# Patient Record
Sex: FEMALE | Race: WHITE | NOT HISPANIC OR LATINO | Employment: OTHER | URBAN - METROPOLITAN AREA
[De-identification: names, ages, dates, MRNs, and addresses within clinical notes are randomized per-mention and may not be internally consistent; named-entity substitution may affect disease eponyms.]

---

## 2016-12-15 LAB
CBC, PLATELET CT, AND DIFF: (no result)
EXTERNAL ABO GROUPING: NORMAL
EXTERNAL ANTIBODY SCREEN: NEGATIVE
EXTERNAL CHLAMYDIA SCREEN: NEGATIVE
EXTERNAL GC/CHLAMYDIA: NORMAL
EXTERNAL GONORRHEA SCREEN: NEGATIVE
EXTERNAL RH FACTOR: POSITIVE
EXTERNAL RUBELLA QUALITATIVE: (no result)
EXTERNAL THINPREP: NEGATIVE
EXTERNAL URINE CULTURE: NORMAL
HIV1 AB SPEC QL IA.RAPID: NEGATIVE
VZV IGG SER QL: NORMAL

## 2017-01-11 RX ORDER — PNV 112/IRON/FOLIC/OM3/DHA/EPA 3.33-.33MG
3 TABLET,CHEWABLE ORAL DAILY
Qty: 90 TABLET | Refills: 11 | Status: SHIPPED | OUTPATIENT
Start: 2017-01-11 | End: 2017-11-17

## 2017-01-17 ENCOUNTER — ROUTINE PRENATAL (OUTPATIENT)
Dept: OBSTETRICS AND GYNECOLOGY | Age: 37
End: 2017-01-17

## 2017-01-17 ENCOUNTER — PROCEDURE VISIT (OUTPATIENT)
Dept: OBSTETRICS AND GYNECOLOGY | Age: 37
End: 2017-01-17

## 2017-01-17 ENCOUNTER — TELEPHONE (OUTPATIENT)
Dept: OBSTETRICS AND GYNECOLOGY | Age: 37
End: 2017-01-17

## 2017-01-17 VITALS — BODY MASS INDEX: 31.79 KG/M2 | WEIGHT: 203 LBS | SYSTOLIC BLOOD PRESSURE: 110 MMHG | DIASTOLIC BLOOD PRESSURE: 74 MMHG

## 2017-01-17 DIAGNOSIS — O36.80X1 PREGNANCY WITH INCONCLUSIVE FETAL VIABILITY, FETUS 1: Primary | ICD-10-CM

## 2017-01-17 DIAGNOSIS — Z34.82 PRENATAL CARE, SUBSEQUENT PREGNANCY, SECOND TRIMESTER: ICD-10-CM

## 2017-01-17 DIAGNOSIS — Z34.02 ENCOUNTER FOR SUPERVISION OF NORMAL FIRST PREGNANCY IN SECOND TRIMESTER: Primary | ICD-10-CM

## 2017-01-17 DIAGNOSIS — IMO0002 ADULT BODY MASS INDEX GREATER THAN 30: ICD-10-CM

## 2017-01-17 DIAGNOSIS — Z3A.13 13 WEEKS GESTATION OF PREGNANCY: ICD-10-CM

## 2017-01-17 PROCEDURE — 76801 OB US < 14 WKS SINGLE FETUS: CPT | Performed by: NURSE PRACTITIONER

## 2017-01-17 PROCEDURE — 0502F SUBSEQUENT PRENATAL CARE: CPT | Performed by: NURSE PRACTITIONER

## 2017-01-17 NOTE — MR AVS SNAPSHOT
Clementina Hernandez   2017 3:15 PM   Procedure visit    Dept Phone:  [phone]   Encounter #:  [id number]    Provider:  ULTRASOUND AFTAB MARIO   Department:  [facility] OB [facility]YN                Your Full Care Plan              Your Updated Medication List          This list is accurate as of: 17  3:54 PM.  Always use your most recent med list.                VITAFOL GUMMIES 3.33-0.333-34.8 MG chewable tablet   Chew 3 tablets Daily.               We Performed the Following     US Ob < 14 Weeks Single or First Gestation       You Were Diagnosed With        Codes Comments    Pregnancy with inconclusive fetal viability, fetus 1    -  Primary ICD-10-CM: O36.80X1  ICD-9-CM: V23.87     Adult body mass index greater than 30     ICD-10-CM: E66.8  ICD-9-CM: V85.30     13 weeks gestation of pregnancy     ICD-10-CM: Z3A.13  ICD-9-CM: V22.2     Prenatal care, subsequent pregnancy, second trimester     ICD-10-CM: Z34.82  ICD-9-CM: V22.1       Instructions     None    Patient Instructions History      Upcoming Appointments     Visit Type Date Time Department    OB FOLLOWUP 2017  3:00 PM [facility]    ULTRASOUND 2017  3:15 PM [facility]    OB FOLLOWUP 2/15/2017  8:30 AM [facility]      MyChart Signup     [facility] Compario allows you to send messages to your doctor, view your test results, renew your prescriptions, schedule appointments, and more. To sign up, go to [employer] and click on the Sign Up Now link in the New User? box. Enter your Compario Activation Code exactly as it appears below along with the last four digits of your Social Security Number and your Date of Birth () to complete the sign-up process. If you do not sign up before the expiration date, you must request a new code.    Compario Activation Code: [email or web address]  Expires: 2017  3:52 PM    If you have questions, you can email  [email or web address] or call [phone] to talk to our MyChart staff. Remember, [employer] is NOT to be used for urgent needs. For medical emergencies, dial 911.               Other Info from Your Visit           Your Appointments     Feb 15, 2017  8:30 AM EST   OB FOLLOWUP with Dru Dawson MD   [facility] OB GYN (--)    [address] [address] [facility] [address]   [phone]              Allergies     No Known Allergies      Vital Signs     Last Menstrual Period Smoking Status                10/06/2016 Never Smoker          Problems and Diagnoses Noted     Body mass index (BMI) greater than 30 in adult    Pregnancy with inconclusive fetal viability, fetus 1    -  Primary    13 weeks gestation of pregnancy        Prenatal care

## 2017-01-17 NOTE — MR AVS SNAPSHOT
Clementina Hernandez   2017 3:00 PM   Routine Prenatal    Dept Phone:  [phone]   Encounter #:  [id number]    Provider:  JANET De Anda   Department:  [facility] OB [facility]YN                Your Full Care Plan              Your Updated Medication List          This list is accurate as of: 17  3:52 PM.  Always use your most recent med list.                VITAFOL GUMMIES 3.33-0.333-34.8 MG chewable tablet   Chew 3 tablets Daily.               Instructions     None    Patient Instructions History      Upcoming Appointments     Visit Type Date Time Department    OB FOLLOWUP 2017  3:00 PM [facility]    [facility]    OB FOLLOWUP 2/15/2017  8:30 AM [facility]      MyChart Signup     [facility] Outsell allows you to send messages to your doctor, view your test results, renew your prescriptions, schedule appointments, and more. To sign up, go to [employer] and click on the Sign Up Now link in the New User? box. Enter your Outsell Activation Code exactly as it appears below along with the last four digits of your Social Security Number and your Date of Birth () to complete the sign-up process. If you do not sign up before the expiration date, you must request a new code.    Outsell Activation Code: [email or web address]  Expires: 2017  3:52 PM    If you have questions, you can email [email or web address] or call [phone] to talk to our Outsell staff. Remember, Outsell is NOT to be used for urgent needs. For medical emergencies, dial 911.               Other Info from Your Visit           Your Appointments     Feb 15, 2017  8:30 AM EST   OB FOLLOWUP with Dru Dawson MD   [facility] (--)    [address] [facility] [address] [phone]              Allergies     No Known Allergies      Vital Signs     Blood Pressure Weight  Last Menstrual Period Body Mass Index Smoking Status       110/74 203 lb (92.1 kg) 10/06/2016 31.79 kg/m2 Never Smoker

## 2017-01-17 NOTE — TELEPHONE ENCOUNTER
Clementina was here today for an U/S she states that the U/S she had today showed her being further along than the pervious U/S.  She wanted to know if her next appt. In four weeks would be the anatomy scan??

## 2017-01-17 NOTE — PROGRESS NOTES
Ultrasound done. 14w1d by US, .  Morning sickness has gotten much better.  She is having some slight swelling in hands but no where else.  Discussed increasing water intake. Declines any genetic testing.

## 2017-01-20 ENCOUNTER — TELEPHONE (OUTPATIENT)
Dept: OBSTETRICS AND GYNECOLOGY | Age: 37
End: 2017-01-20

## 2017-01-22 NOTE — TELEPHONE ENCOUNTER
Yes, we will use the new due date of July 17 based on this recent ultrasound which placed her at 14 weeks 1 day instead of 13 weeks 3 days.

## 2017-01-26 NOTE — TELEPHONE ENCOUNTER
I think this more recent scan will be the more accurate scan for dating.  So we will go with the new due date.  However in 4 weeks she will only be 18 weeks along and should not do the anatomy scan until she is 20 weeks along.  And I think it would be a good idea for her to do the NexGen testing given her age.  Although I expect she would not have a termination if the baby had a chromosome problem.

## 2017-01-31 ENCOUNTER — TELEPHONE (OUTPATIENT)
Dept: OBSTETRICS AND GYNECOLOGY | Age: 37
End: 2017-01-31

## 2017-01-31 ENCOUNTER — PROCEDURE VISIT (OUTPATIENT)
Dept: OBSTETRICS AND GYNECOLOGY | Age: 37
End: 2017-01-31

## 2017-01-31 ENCOUNTER — ROUTINE PRENATAL (OUTPATIENT)
Dept: OBSTETRICS AND GYNECOLOGY | Age: 37
End: 2017-01-31

## 2017-01-31 VITALS — SYSTOLIC BLOOD PRESSURE: 128 MMHG | DIASTOLIC BLOOD PRESSURE: 82 MMHG | WEIGHT: 206 LBS | BODY MASS INDEX: 32.26 KG/M2

## 2017-01-31 DIAGNOSIS — Z3A.16 16 WEEKS GESTATION OF PREGNANCY: ICD-10-CM

## 2017-01-31 DIAGNOSIS — Z3A.16 16 WEEKS GESTATION OF PREGNANCY: Primary | ICD-10-CM

## 2017-01-31 DIAGNOSIS — O26.852 SPOTTING AFFECTING PREGNANCY IN SECOND TRIMESTER: Primary | ICD-10-CM

## 2017-01-31 PROCEDURE — 76817 TRANSVAGINAL US OBSTETRIC: CPT | Performed by: OBSTETRICS & GYNECOLOGY

## 2017-01-31 PROCEDURE — 76805 OB US >/= 14 WKS SNGL FETUS: CPT | Performed by: OBSTETRICS & GYNECOLOGY

## 2017-01-31 PROCEDURE — 99213 OFFICE O/P EST LOW 20 MIN: CPT | Performed by: OBSTETRICS & GYNECOLOGY

## 2017-01-31 NOTE — MR AVS SNAPSHOT
Clementina Hernandez   2017 12:45 PM   Routine Prenatal    Dept Phone:  [phone]   Encounter #:  [id number]    Provider:  Dru Dawson MD   Department:  [facility]                Your Full Care Plan              Your Updated Medication List          This list is accurate as of: 17  2:08 PM.  Always use your most recent med list.                VITAFOL GUMMIES 3.33-0.333-34.8 MG chewable tablet   Chew 3 tablets Daily.               You Were Diagnosed With        Codes Comments    16 weeks gestation of pregnancy    -  Primary ICD-10-CM: Z3A.16  ICD-9-CM: V22.2       Instructions     None    Patient Instructions History      Upcoming Appointments     Visit Type Date Time Department    OB FOLLOWUP 2017 12:45 PM [facility]    ULTRASOUND 2017  1:15 PM [facility]    OB FOLLOWUP 2017 11:00 AM [facility]    ULTRASOUND 2017 11:15 AM [facility]      MyChart Signup     [facility] [employer] Branch allows you to send messages to your doctor, view your test results, renew your prescriptions, schedule appointments, and more. To sign up, go to [employer] and click on the Sign Up Now link in the New User? box. Enter your [employer] Activation Code exactly as it appears below along with the last four digits of your Social Security Number and your Date of Birth () to complete the sign-up process. If you do not sign up before the expiration date, you must request a new code.    [employer] Activation Code: [email or web address]  Expires: 2017  3:52 PM    If you have questions, you can email [email or web address] or call [phone] to talk to our Boll & Branch staff. Remember, B[employer] Branch is NOT to be used for urgent needs. For medical emergencies, dial 911.               Other Info from Your Visit           Your Appointments     2017 11:00 AM EST   OB FOLLOWUP with Dru Dawson MD      [facility] (--)    [address] [facility] [address]   [phone]            Feb 21, 2017 11:15 AM EST   Ultrasound with ULTRASOUND [facility] (--)    [address] [facility] [address]   [phone]              Allergies     No Known Allergies      Reason for Visit     Pregnancy Problem U/S check cervical lenght   spotting  + cramping / passed a clot /        Vital Signs     Blood Pressure Weight Last Menstrual Period Body Mass Index Smoking Status       128/82 206 lb (93.4 kg) 10/06/2016 32.26 kg/m2 Never Smoker       Problems and Diagnoses Noted     16 weeks gestation of pregnancy    -  Primary

## 2017-01-31 NOTE — TELEPHONE ENCOUNTER
Dr PRIDE pt, 16 wks has had some brown spotting with light cramping yesterday and today, please advise.

## 2017-01-31 NOTE — MR AVS SNAPSHOT
Clementina Hernandez   2017 1:15 PM   Procedure visit    Dept Phone:  [phone]   Encounter #:  [id number]    Provider:  ULTRASOUND HILLARY MARTIN   Department:  [facility]                Your Full Care Plan              Your Updated Medication List          This list is accurate as of: 17  2:08 PM.  Always use your most recent med list.                VITAFOL GUMMIES 3.33-0.333-34.8 MG chewable tablet   Chew 3 tablets Daily.               We Performed the Following     US Ob Transvaginal       You Were Diagnosed With        Codes Comments    Spotting affecting pregnancy in second trimester    -  Primary ICD-10-CM: O26.852  ICD-9-CM: 649.53     16 weeks gestation of pregnancy     ICD-10-CM: Z3A.16  ICD-9-CM: V22.2       Instructions     None    Patient Instructions History      Upcoming Appointments     Visit Type Date Time Department    OB FOLLOWUP 2017 12:45 PM [facility]    ULTRASOUND 2017  1:15 PM [facility]    OB FOLLOWUP 2017 11:00 AM [facility]    ULTRASOUND 2017 11:15 AM [facility]      MyChart Signup     [facility] [employer] allows you to send messages to your doctor, view your test results, renew your prescriptions, schedule appointments, and more. To sign up, go to [employer]erCurious and click on the Sign Up Now link in the New User? box. Enter your [employer] Activation Code exactly as it appears below along with the last four digits of your Social Security Number and your Date of Birth () to complete the sign-up process. If you do not sign up before the expiration date, you must request a new code.    [employer] Activation Code: [email or web address]  Expires: 2017  3:52 PM    If you have questions, you can email [email or web address] or call [phone] to talk to our [employer] staff. Remember, [employer] is NOT to be used for urgent needs. For medical emergencies, dial  911.               Other Info from Your Visit           Your Appointments     Feb 21, 2017 11:00 AM EST   OB FOLLOWUP with Dru Dawson MD   [facility] (--)    [address] [facility] [address] [facility] [address]   [phone]            Feb 21, 2017 11:15 AM EST   [facility]    [address] [facility] [address]   [phone]              Allergies     No Known Allergies      Vital Signs     Last Menstrual Period Smoking Status                10/06/2016 Never Smoker          Problems and Diagnoses Noted     Spotting complicating pregnancy    -  Primary    16 weeks gestation of pregnancy

## 2017-01-31 NOTE — PROGRESS NOTES
Had intercourse on Sunday, January 29, and then was having cramping Sunday with no bleeding, started spotting on Monday and again today and passed a quarter size clot or little larger.  No leakage of fluid.  U/S= viable intrauterine fetus with a fetal heart tones 139.  Estimated fetal weight 5 ounces, 42%; AC 79%; cervix 4.93 cm with no dynamic changes.  T-shaped internal os.  No sludge.  Small 3 x 9 mm collection of blood at the edge of the placenta.  There was some old blood present on the probe after the ultrasound.     Speculum exam shows a large cervix with a patulous external os some old blood present at the cervix.  On bimanual exam the cervix feels firm and long with the external os not quite a fingertip dilated.     Assessment: Suspect there may have been some bleeding from an edge of the placenta or it may just have been from irritation of the cervix.  Reassured Clementina Goodman.  However I do recommend that not have intercourse for at least a month.  We will repeat an ultrasound in 3 weeks, just before she begins to travel again to [place].

## 2017-02-21 ENCOUNTER — ROUTINE PRENATAL (OUTPATIENT)
Dept: OBSTETRICS AND GYNECOLOGY | Age: 37
End: 2017-02-21

## 2017-02-21 ENCOUNTER — PROCEDURE VISIT (OUTPATIENT)
Dept: OBSTETRICS AND GYNECOLOGY | Age: 37
End: 2017-02-21

## 2017-02-21 VITALS — WEIGHT: 213 LBS | BODY MASS INDEX: 33.36 KG/M2 | DIASTOLIC BLOOD PRESSURE: 78 MMHG | SYSTOLIC BLOOD PRESSURE: 110 MMHG

## 2017-02-21 DIAGNOSIS — Z3A.19 19 WEEKS GESTATION OF PREGNANCY: ICD-10-CM

## 2017-02-21 DIAGNOSIS — Z34.82 PRENATAL CARE, SUBSEQUENT PREGNANCY, SECOND TRIMESTER: ICD-10-CM

## 2017-02-21 DIAGNOSIS — Z34.82 PRENATAL CARE, SUBSEQUENT PREGNANCY, SECOND TRIMESTER: Primary | ICD-10-CM

## 2017-02-21 DIAGNOSIS — Z36.89 SCREENING, ANTENATAL, FOR FETAL ANATOMIC SURVEY: Primary | ICD-10-CM

## 2017-02-21 DIAGNOSIS — O26.852 SPOTTING AFFECTING PREGNANCY IN SECOND TRIMESTER: ICD-10-CM

## 2017-02-21 PROCEDURE — 76805 OB US >/= 14 WKS SNGL FETUS: CPT | Performed by: OBSTETRICS & GYNECOLOGY

## 2017-02-21 PROCEDURE — 76817 TRANSVAGINAL US OBSTETRIC: CPT | Performed by: OBSTETRICS & GYNECOLOGY

## 2017-02-21 PROCEDURE — 0502F SUBSEQUENT PRENATAL CARE: CPT | Performed by: OBSTETRICS & GYNECOLOGY

## 2017-02-21 NOTE — PROGRESS NOTES
Has had no more bleeding after a little after the last US. Travelling to [place] next week.  U/S Normal Anatomy, post placenta. Cervix looks good, 6.12 cm long. Placenta well away, 3.68 cm from cervix.  Will repeat US in 3 weeks for the outflow tracts.  RTO 3 weeks for US

## 2017-02-22 ENCOUNTER — TELEPHONE (OUTPATIENT)
Dept: OBSTETRICS AND GYNECOLOGY | Age: 37
End: 2017-02-22

## 2017-02-22 NOTE — TELEPHONE ENCOUNTER
----- Message from Nayanawestley Rainey sent at 2/22/2017 12:42 PM EST -----  Dr Faria pt, pt was seen in office yesterday and forgot to ask when she is able to have sex again. Please call pt.     Pt#: [phone]

## 2017-02-22 NOTE — TELEPHONE ENCOUNTER
I called Clementina Goodman and told her not to have sex until after she gets back from [place], then it would be okay.

## 2017-03-05 ENCOUNTER — HOSPITAL ENCOUNTER (OUTPATIENT)
Facility: HOSPITAL | Age: 37
Setting detail: OBSERVATION
Discharge: HOME | End: 2017-03-06
Attending: OBSTETRICS & GYNECOLOGY | Admitting: OBSTETRICS & GYNECOLOGY

## 2017-03-05 DIAGNOSIS — K52.9 GASTROENTERITIS: Primary | ICD-10-CM

## 2017-03-05 PROBLEM — Z34.90 PREGNANCY: Status: ACTIVE | Noted: 2017-03-05

## 2017-03-05 PROCEDURE — 99218 PR INITIAL OBSERVATION CARE/DAY 30 MINUTES: CPT | Performed by: OBSTETRICS & GYNECOLOGY

## 2017-03-05 PROCEDURE — G0378 HOSPITAL OBSERVATION PER HR: HCPCS

## 2017-03-05 PROCEDURE — 87493 C DIFF AMPLIFIED PROBE: CPT | Performed by: OBSTETRICS & GYNECOLOGY

## 2017-03-05 RX ORDER — SODIUM CHLORIDE, SODIUM LACTATE, POTASSIUM CHLORIDE, CALCIUM CHLORIDE 600; 310; 30; 20 MG/100ML; MG/100ML; MG/100ML; MG/100ML
999 INJECTION, SOLUTION INTRAVENOUS ONCE
Status: COMPLETED | OUTPATIENT
Start: 2017-03-06 | End: 2017-03-06

## 2017-03-05 RX ORDER — ONDANSETRON 2 MG/ML
4 INJECTION INTRAMUSCULAR; INTRAVENOUS EVERY 6 HOURS PRN
Status: DISCONTINUED | OUTPATIENT
Start: 2017-03-05 | End: 2017-03-06 | Stop reason: HOSPADM

## 2017-03-05 RX ORDER — SODIUM CHLORIDE 0.9 % (FLUSH) 0.9 %
1-10 SYRINGE (ML) INJECTION AS NEEDED
Status: DISCONTINUED | OUTPATIENT
Start: 2017-03-05 | End: 2017-03-06 | Stop reason: HOSPADM

## 2017-03-05 RX ORDER — LIDOCAINE HYDROCHLORIDE 10 MG/ML
5 INJECTION, SOLUTION INFILTRATION; PERINEURAL AS NEEDED
Status: DISCONTINUED | OUTPATIENT
Start: 2017-03-05 | End: 2017-03-06 | Stop reason: HOSPADM

## 2017-03-05 RX ADMIN — LIDOCAINE HYDROCHLORIDE 0.2 ML: 10 INJECTION, SOLUTION EPIDURAL; INFILTRATION; INTRACAUDAL; PERINEURAL at 23:32

## 2017-03-06 VITALS
OXYGEN SATURATION: 97 % | HEART RATE: 88 BPM | SYSTOLIC BLOOD PRESSURE: 100 MMHG | TEMPERATURE: 98.8 F | RESPIRATION RATE: 18 BRPM | DIASTOLIC BLOOD PRESSURE: 60 MMHG | WEIGHT: 210 LBS | HEIGHT: 67 IN | BODY MASS INDEX: 32.96 KG/M2

## 2017-03-06 LAB
ALBUMIN SERPL-MCNC: 3.8 G/DL (ref 3.5–5.2)
ALBUMIN/GLOB SERPL: 1.2 G/DL
ALP SERPL-CCNC: 66 U/L (ref 39–117)
ALT SERPL W P-5'-P-CCNC: 10 U/L (ref 1–33)
ANION GAP SERPL CALCULATED.3IONS-SCNC: 18.7 MMOL/L
AST SERPL-CCNC: 15 U/L (ref 1–32)
BILIRUB SERPL-MCNC: 0.3 MG/DL (ref 0.1–1.2)
BUN BLD-MCNC: 9 MG/DL (ref 6–20)
BUN/CREAT SERPL: 16.7 (ref 7–25)
C DIFF TOX GENS STL QL NAA+PROBE: NEGATIVE
CALCIUM SPEC-SCNC: 9 MG/DL (ref 8.6–10.5)
CHLORIDE SERPL-SCNC: 101 MMOL/L (ref 98–107)
CO2 SERPL-SCNC: 19.3 MMOL/L (ref 22–29)
CREAT BLD-MCNC: 0.54 MG/DL (ref 0.57–1)
DEPRECATED RDW RBC AUTO: 40.2 FL (ref 37–54)
ERYTHROCYTE [DISTWIDTH] IN BLOOD BY AUTOMATED COUNT: 13.1 % (ref 11.7–13)
GFR SERPL CREATININE-BSD FRML MDRD: 129 ML/MIN/1.73
GLOBULIN UR ELPH-MCNC: 3.1 GM/DL
GLUCOSE BLD-MCNC: 111 MG/DL (ref 65–99)
HCT VFR BLD AUTO: 40.9 % (ref 35.6–45.5)
HGB BLD-MCNC: 14.4 G/DL (ref 11.9–15.5)
LYMPHOCYTES # BLD MANUAL: 0.76 10*3/MM3 (ref 0.9–4.8)
LYMPHOCYTES NFR BLD MANUAL: 3 % (ref 5–12)
LYMPHOCYTES NFR BLD MANUAL: 5 % (ref 19.6–45.3)
MCH RBC QN AUTO: 29.8 PG (ref 26.9–32)
MCHC RBC AUTO-ENTMCNC: 35.2 G/DL (ref 32.4–36.3)
MCV RBC AUTO: 84.7 FL (ref 80.5–98.2)
MONOCYTES # BLD AUTO: 0.46 10*3/MM3 (ref 0.2–1.2)
NEUTROPHILS # BLD AUTO: 14 10*3/MM3 (ref 1.9–8.1)
NEUTROPHILS NFR BLD MANUAL: 92 % (ref 42.7–76)
PLAT MORPH BLD: NORMAL
PLATELET # BLD AUTO: 201 10*3/MM3 (ref 140–500)
PMV BLD AUTO: 11.1 FL (ref 6–12)
POTASSIUM BLD-SCNC: 3.9 MMOL/L (ref 3.5–5.2)
PROT SERPL-MCNC: 6.9 G/DL (ref 6–8.5)
RBC # BLD AUTO: 4.83 10*6/MM3 (ref 3.9–5.2)
RBC MORPH BLD: NORMAL
SODIUM BLD-SCNC: 139 MMOL/L (ref 136–145)
WBC MORPH BLD: NORMAL
WBC NRBC COR # BLD: 15.22 10*3/MM3 (ref 4.5–10.7)

## 2017-03-06 PROCEDURE — 87046 STOOL CULTR AEROBIC BACT EA: CPT | Performed by: OBSTETRICS & GYNECOLOGY

## 2017-03-06 PROCEDURE — 96376 TX/PRO/DX INJ SAME DRUG ADON: CPT

## 2017-03-06 PROCEDURE — 85027 COMPLETE CBC AUTOMATED: CPT | Performed by: OBSTETRICS & GYNECOLOGY

## 2017-03-06 PROCEDURE — 80053 COMPREHEN METABOLIC PANEL: CPT | Performed by: OBSTETRICS & GYNECOLOGY

## 2017-03-06 PROCEDURE — 25010000002 ONDANSETRON PER 1 MG: Performed by: OBSTETRICS & GYNECOLOGY

## 2017-03-06 PROCEDURE — 85007 BL SMEAR W/DIFF WBC COUNT: CPT | Performed by: OBSTETRICS & GYNECOLOGY

## 2017-03-06 PROCEDURE — 87045 FECES CULTURE AEROBIC BACT: CPT | Performed by: OBSTETRICS & GYNECOLOGY

## 2017-03-06 PROCEDURE — 96361 HYDRATE IV INFUSION ADD-ON: CPT

## 2017-03-06 PROCEDURE — 96374 THER/PROPH/DIAG INJ IV PUSH: CPT

## 2017-03-06 PROCEDURE — G0378 HOSPITAL OBSERVATION PER HR: HCPCS

## 2017-03-06 RX ORDER — SODIUM CHLORIDE, SODIUM LACTATE, POTASSIUM CHLORIDE, CALCIUM CHLORIDE 600; 310; 30; 20 MG/100ML; MG/100ML; MG/100ML; MG/100ML
125 INJECTION, SOLUTION INTRAVENOUS CONTINUOUS
Status: DISCONTINUED | OUTPATIENT
Start: 2017-03-06 | End: 2017-03-06 | Stop reason: HOSPADM

## 2017-03-06 RX ADMIN — SODIUM CHLORIDE, POTASSIUM CHLORIDE, SODIUM LACTATE AND CALCIUM CHLORIDE 125 ML/HR: 600; 310; 30; 20 INJECTION, SOLUTION INTRAVENOUS at 01:05

## 2017-03-06 RX ADMIN — ONDANSETRON 4 MG: 2 INJECTION INTRAMUSCULAR; INTRAVENOUS at 00:35

## 2017-03-06 RX ADMIN — ONDANSETRON 4 MG: 2 INJECTION INTRAMUSCULAR; INTRAVENOUS at 07:10

## 2017-03-06 RX ADMIN — SODIUM CHLORIDE, POTASSIUM CHLORIDE, SODIUM LACTATE AND CALCIUM CHLORIDE 999 ML/HR: 600; 310; 30; 20 INJECTION, SOLUTION INTRAVENOUS at 00:07

## 2017-03-06 NOTE — NURSING NOTE
LR stopped, IV d/c'd, logicare instructions given regarding N/V/D, pt verbalizes understanding of all education given with no questions asked, pt up to get dressed and ready for d/c

## 2017-03-06 NOTE — H&P
[facility]  Obstetric History and Physical    Chief Complaint   Patient presents with   • Vomiting     Vomiting today, Diarrhea started 3/4/2017 @   20 times today,  most contractions last night       Subjective     Patient is a 36 y.o. female  currently at 21w3d, who presents with proximally 24 hours of nausea vomiting and diarrhea.  Patient estimates she has passed about 30 loose stools.  She has mild diffuse abdominal pain due to the diarrhea.  She feels some abdominal pressure but no sharp pain.  No vaginal bleeding.   The patient returned from [place] on Thursday.  No sick contacts..      The following portions of the patients history were reviewed and updated as appropriate: current medications, allergies, past medical history, past surgical history, past family history, past social history and problem list .       Prenatal Information:  Prenatal Results         1st Trimester Ref. Range Date Time   CBC with auto diff ^ WBC=10.9; H/H=14.5/43.9; Ljns=046.   12/15/16    Rubella IgG ^ Non-Immune   12/15/16    Hepatitis B SAg  Negative  Negative 12/15/16 1313   RPR  Non Reactive  Non Reactive 12/15/16 1313   ABO  B   12/15/16 1313   Rh  Positive   12/15/16 1313   Anibody Screen  Negative  Negative 12/15/16 1313   HIV ^ Negative   12/15/16    Varicella IgG ^ Immune   12/15/16    Urinalysis with microscopy       Urine Culture ^ No Growth.   12/15/16    GC/Chlamydia/TV ^ Neg GC / Neg CT   12/15/16    ThinPrep/Pap ^ Negative   12/15/16    2nd and 3rd Trimester Ref. Range Date Time   Hemoglobin / Hematocrit  43.9 % 34.0 - 46.6 % 12/15/16 1313   Hemoglobin  14.5 g/dL 11.1 - 15.9 g/dL 12/15/16 1313   Group B Strep Culture       Glucose Challege Test 1 hour       Glucose Tolerance Test 3 hours       Pre-eclampsia Panel       Risk Screening Ref. Range Date Time   Fetal Fibronectin       Amnisure       Hepatitis C Antibody  <0.1 s/co ratio 0.0 - 0.9 s/co ratio 12/15/16 1313   Hemoglobin electrophoresis        Cystic Fibrosis       Hemoglobin A1C       MSAFP - 4       NIPT       AFP       Parvovirus IgG       Parvovirus IgM       POCT - glucose       [facility]       24 Hour urine - Total protein       24 Hour urine - Creatinine clearance       Urinalysis with microscopy       Urine Culture ^ No Growth.   12/15/16    Drug Screening Ref. Range Date Time   Amphetamine Screen       Barbiturate Screen       Benzodiazepine Screen       Methadone Screen       Phencyclidine Screen       Opiates Screen       THC Screen       Cocaine Screen       Amphetamine Screen       Propoxyphene Screen       Buprenorphine Screen       Methamphetamine Screen       Oxycodone Screen       Tryicyclic Antidepressants Screen              Legend: ^: Historical            View all results for this pregnancy        External Prenatal Results         Pregnancy Outside Results - these were transcribed from office records.  See scanned records for details. Date Time   Hgb      Hct      ABO      Rh      Antibody Screen      Glucose Fasting GTT      Glucose Tolerance Test 1 hour      Glucose Tolerance Test 3 hour      Gonorrhea (discrete)      Chlamydia (discrete)      RPR      VDRL      Syphillis Antibody      Rubella ^ Non-Immune  12/15/16    HBsAg      Herpes Simplex Virus PCR      Herpes Simplex VIrus Culture      HIV ^ Negative  12/15/16    Hep C RNA Quant PCR      Hep C Antibody      Urine Drug Screen      AFP      Group B Strep      GBS Susceptibility to Clindamycin      GBS Susceptibility to Eythromycin      Fetal Fibronectin      Genetic Testing, Maternal Blood             Legend: ^: Historical           Past OB History:     Obstetric History       T2      TAB0   SAB0   E0   M0   L4       # Outcome Date GA Lbr Damon/2nd Weight Sex Delivery Anes PTL Lv   5 Current            4   36w0d  6 lb 5 oz (2.863 kg) F Vag-Spont  N Y      Name: ANABEL      Complications: Precipitous delivery   3   36w0d  7 lb 3 oz (3.26 kg) M  "Vag-Spont None Y Y      Name: SUNDEEP      Complications:  premature rupture of membranes (PPROM) delivered, current hospitalization,Precipitous delivery   2 Term  39w0d  7 lb 14 oz (3.572 kg) F Vag-Spont   Y      Name: MARTI      Complications: PIH (pregnancy induced hypertension), antepartum   1 Term  38w0d  6 lb 14 oz (3.118 kg) F Vag-Spont  Y Y      Name: ANGLE          Past Medical History: Past Medical History   Diagnosis Date   • Gestational hypertension      Only with the second pregnancy.   • History of benign schwannoma      Involving lumbar spine low the level of L-2 Dr. Chencho Skelton mentioned in  that it was \"reaching out to us\".   • Hypertension    • Hypertension in pregnancy, preeclampsia      Only with the second pregnancy.   • Hypothyroid      not medicating    • Migraine      Occasional headaches when pregnant, ? Not true migraine.   •  labor in third trimester    • Scoliosis      Pt denies   • Vaginal delivery      x4   Angle      Marti       Sundeep    2016   Cece       Past Surgical History Past Surgical History   Procedure Laterality Date   • Dental procedure       IMPLANT      Family History: Family History   Problem Relation Age of Onset   • No Known Problems Daughter    • No Known Problems Daughter    • No Known Problems Son    • No Known Problems Daughter       Social History:  reports that she has never smoked. She has never used smokeless tobacco.   reports that she does not drink alcohol.   reports that she does not use illicit drugs.        General ROS: Pertinent items are noted in HPI    Objective       Vital Signs Range for the last 24 hours  Temperature: Temp:  [97.8 °F (36.6 °C)] 97.8 °F (36.6 °C)   Temp Source: Temp src: Oral   BP:     Pulse:     Respirations: Resp:  [18] 18   SPO2: SpO2:  [97 %] 97 %   O2 Amount (l/min):     O2 Devices     Weight: Weight:  [210 lb (95.3 kg)] 210 lb (95.3 kg)     Physical Examination: General " appearance - appears tired  Mental status - normal mood, behavior, speech, dress, motor activity, and thought processes  Eyes - sclera anicteric  Abdomen - soft, nontender, nondistended, no masses or organomegaly  Gravid  Extremities - nontender bilaterally                            Fetal Heart Rate Assessment   Method: Fetal HR Assessment Method: external   Beats/min:     Baseline:     Varibility:     Accels:     Decels:     Tracing Category:       Uterine Assessment   Method: Method: palpation, TOCO (external toco transducer)   Frequency (min):     Ctx Count in 10 min:     Duration:     Intensity:     Intensity by IUPC:     Resting Tone:     Resting Tone by IUPC:     [place] Units:           Assessment/Plan     Active Problems:    Pregnancy        Assessment: Gastroenteritis  Plan: IV fluids, supportive care IV Zofran and check labs and stool cultures      Marlon Ospina MD  3/5/2017  11:48 PM

## 2017-03-09 LAB
BACTERIA SPEC AEROBE CULT: NORMAL
BACTERIA SPEC AEROBE CULT: NORMAL

## 2017-03-14 ENCOUNTER — PROCEDURE VISIT (OUTPATIENT)
Dept: OBSTETRICS AND GYNECOLOGY | Age: 37
End: 2017-03-14

## 2017-03-14 ENCOUNTER — ROUTINE PRENATAL (OUTPATIENT)
Dept: OBSTETRICS AND GYNECOLOGY | Age: 37
End: 2017-03-14

## 2017-03-14 VITALS — DIASTOLIC BLOOD PRESSURE: 82 MMHG | SYSTOLIC BLOOD PRESSURE: 124 MMHG | BODY MASS INDEX: 33.99 KG/M2 | WEIGHT: 217 LBS

## 2017-03-14 DIAGNOSIS — Z3A.22 22 WEEKS GESTATION OF PREGNANCY: ICD-10-CM

## 2017-03-14 DIAGNOSIS — Z36.9 NO ABNORMALITY OF FETAL HEART DETECTED: Primary | ICD-10-CM

## 2017-03-14 DIAGNOSIS — Z34.82 PRENATAL CARE, SUBSEQUENT PREGNANCY, SECOND TRIMESTER: Primary | ICD-10-CM

## 2017-03-14 PROCEDURE — 76816 OB US FOLLOW-UP PER FETUS: CPT | Performed by: OBSTETRICS & GYNECOLOGY

## 2017-03-14 PROCEDURE — 0502F SUBSEQUENT PRENATAL CARE: CPT | Performed by: OBSTETRICS & GYNECOLOGY

## 2017-03-14 NOTE — PROGRESS NOTES
Normal anatomy. Fundal placenta. Normal cardiac outflow tracts. Definite Male.  Hosp overnight 3/5 with nausea and vomiting and diarrhea for IV fluids. Still has some neck and shoulder pain.  Everybody in the family had same thing after Clementina Goodman.

## 2017-04-12 ENCOUNTER — ROUTINE PRENATAL (OUTPATIENT)
Dept: OBSTETRICS AND GYNECOLOGY | Age: 37
End: 2017-04-12

## 2017-04-12 VITALS — WEIGHT: 226 LBS | DIASTOLIC BLOOD PRESSURE: 78 MMHG | BODY MASS INDEX: 35.4 KG/M2 | SYSTOLIC BLOOD PRESSURE: 110 MMHG

## 2017-04-12 DIAGNOSIS — Z36.9 ANTENATAL SCREENING ENCOUNTER: ICD-10-CM

## 2017-04-12 DIAGNOSIS — Z3A.26 26 WEEKS GESTATION OF PREGNANCY: ICD-10-CM

## 2017-04-12 DIAGNOSIS — O09.522 AMA (ADVANCED MATERNAL AGE) MULTIGRAVIDA 35+, SECOND TRIMESTER: ICD-10-CM

## 2017-04-12 DIAGNOSIS — Z34.82 ENCOUNTER FOR SUPERVISION OF OTHER NORMAL PREGNANCY, SECOND TRIMESTER: Primary | ICD-10-CM

## 2017-04-12 PROBLEM — Z34.90 PREGNANCY: Status: RESOLVED | Noted: 2017-03-05 | Resolved: 2017-04-12

## 2017-04-12 LAB
BASOPHILS # BLD AUTO: 0.02 10*3/MM3 (ref 0–0.2)
BASOPHILS NFR BLD AUTO: 0.2 % (ref 0–1.5)
EOSINOPHIL # BLD AUTO: 0.06 10*3/MM3 (ref 0–0.7)
EOSINOPHIL NFR BLD AUTO: 0.5 % (ref 0.3–6.2)
ERYTHROCYTE [DISTWIDTH] IN BLOOD BY AUTOMATED COUNT: 13.9 % (ref 11.7–13)
GLUCOSE 1H P 50 G GLC PO SERPL-MCNC: 116 MG/DL (ref 65–139)
HCT VFR BLD AUTO: 37.2 % (ref 35.6–45.5)
HGB BLD-MCNC: 12 G/DL (ref 11.9–15.5)
IMM GRANULOCYTES # BLD: 0.05 10*3/MM3 (ref 0–0.03)
IMM GRANULOCYTES NFR BLD: 0.4 % (ref 0–0.5)
LYMPHOCYTES # BLD AUTO: 2.1 10*3/MM3 (ref 0.9–4.8)
LYMPHOCYTES NFR BLD AUTO: 17.9 % (ref 19.6–45.3)
MCH RBC QN AUTO: 28.4 PG (ref 26.9–32)
MCHC RBC AUTO-ENTMCNC: 32.3 G/DL (ref 32.4–36.3)
MCV RBC AUTO: 87.9 FL (ref 80.5–98.2)
MONOCYTES # BLD AUTO: 0.41 10*3/MM3 (ref 0.2–1.2)
MONOCYTES NFR BLD AUTO: 3.5 % (ref 5–12)
NEUTROPHILS # BLD AUTO: 9.12 10*3/MM3 (ref 1.9–8.1)
NEUTROPHILS NFR BLD AUTO: 77.5 % (ref 42.7–76)
PLATELET # BLD AUTO: 208 10*3/MM3 (ref 140–500)
RBC # BLD AUTO: 4.23 10*6/MM3 (ref 3.9–5.2)
WBC # BLD AUTO: 11.76 10*3/MM3 (ref 4.5–10.7)

## 2017-04-12 PROCEDURE — 0502F SUBSEQUENT PRENATAL CARE: CPT | Performed by: OBSTETRICS & GYNECOLOGY

## 2017-04-12 NOTE — PROGRESS NOTES
Some contractions, for about an hour last night, uncomfortable.  Has gained 9# in the last 4 weeks. Can't really exercise because of contractions.  Rash reviewed/examined. Better with Clindamycin cream given by derm. They didn't think it was shingles. She had a similar area on her left flank last year.  RTO 2 weeks, consider taking Procardia with her on the next trip.

## 2017-04-18 NOTE — PROGRESS NOTES
Notify Clementina Goodman that her glucose screen was normal at 116 and her hemoglobin is good at 12.0, she does not need any iron.

## 2017-04-25 ENCOUNTER — TELEPHONE (OUTPATIENT)
Dept: OBSTETRICS AND GYNECOLOGY | Age: 37
End: 2017-04-25

## 2017-04-26 ENCOUNTER — ROUTINE PRENATAL (OUTPATIENT)
Dept: OBSTETRICS AND GYNECOLOGY | Age: 37
End: 2017-04-26

## 2017-04-26 VITALS — BODY MASS INDEX: 36.49 KG/M2 | SYSTOLIC BLOOD PRESSURE: 108 MMHG | WEIGHT: 233 LBS | DIASTOLIC BLOOD PRESSURE: 72 MMHG

## 2017-04-26 DIAGNOSIS — Z3A.28 28 WEEKS GESTATION OF PREGNANCY: ICD-10-CM

## 2017-04-26 DIAGNOSIS — Z34.83 PRENATAL CARE, SUBSEQUENT PREGNANCY, THIRD TRIMESTER: Primary | ICD-10-CM

## 2017-04-26 DIAGNOSIS — O47.03 PREMATURE UTERINE CONTRACTIONS, ANTEPARTUM, THIRD TRIMESTER: ICD-10-CM

## 2017-04-26 PROCEDURE — 0502F SUBSEQUENT PRENATAL CARE: CPT | Performed by: OBSTETRICS & GYNECOLOGY

## 2017-04-26 RX ORDER — NIFEDIPINE 10 MG/1
10 CAPSULE ORAL EVERY 4 HOURS PRN
Qty: 30 CAPSULE | Refills: 1 | Status: SHIPPED | OUTPATIENT
Start: 2017-04-26 | End: 2017-05-17

## 2017-05-15 ENCOUNTER — ROUTINE PRENATAL (OUTPATIENT)
Dept: OBSTETRICS AND GYNECOLOGY | Age: 37
End: 2017-05-15

## 2017-05-15 VITALS — DIASTOLIC BLOOD PRESSURE: 72 MMHG | WEIGHT: 233 LBS | BODY MASS INDEX: 36.49 KG/M2 | SYSTOLIC BLOOD PRESSURE: 108 MMHG

## 2017-05-15 DIAGNOSIS — Z34.83 PRENATAL CARE, SUBSEQUENT PREGNANCY, THIRD TRIMESTER: Primary | ICD-10-CM

## 2017-05-15 DIAGNOSIS — Z3A.31 31 WEEKS GESTATION OF PREGNANCY: ICD-10-CM

## 2017-05-15 PROCEDURE — 0502F SUBSEQUENT PRENATAL CARE: CPT | Performed by: OBSTETRICS & GYNECOLOGY

## 2017-05-17 ENCOUNTER — TELEPHONE (OUTPATIENT)
Dept: OBSTETRICS AND GYNECOLOGY | Age: 37
End: 2017-05-17

## 2017-05-17 ENCOUNTER — PROCEDURE VISIT (OUTPATIENT)
Dept: OBSTETRICS AND GYNECOLOGY | Age: 37
End: 2017-05-17

## 2017-05-17 ENCOUNTER — ROUTINE PRENATAL (OUTPATIENT)
Dept: OBSTETRICS AND GYNECOLOGY | Age: 37
End: 2017-05-17

## 2017-05-17 VITALS — WEIGHT: 234 LBS | BODY MASS INDEX: 36.65 KG/M2 | SYSTOLIC BLOOD PRESSURE: 118 MMHG | DIASTOLIC BLOOD PRESSURE: 80 MMHG

## 2017-05-17 DIAGNOSIS — Z3A.31 31 WEEKS GESTATION OF PREGNANCY: ICD-10-CM

## 2017-05-17 DIAGNOSIS — O36.63X0 LGA (LARGE FOR GESTATIONAL AGE) FETUS AFFECTING MANAGEMENT OF MOTHER, THIRD TRIMESTER, NOT APPLICABLE OR UNSPECIFIED FETUS: ICD-10-CM

## 2017-05-17 DIAGNOSIS — R10.9 ABDOMINAL PAIN AFFECTING PREGNANCY, ANTEPARTUM: ICD-10-CM

## 2017-05-17 DIAGNOSIS — Z34.83 PRENATAL CARE, SUBSEQUENT PREGNANCY, THIRD TRIMESTER: Primary | ICD-10-CM

## 2017-05-17 DIAGNOSIS — Z3A.31 31 WEEKS GESTATION OF PREGNANCY: Primary | ICD-10-CM

## 2017-05-17 DIAGNOSIS — R10.10 PAIN OF UPPER ABDOMEN: ICD-10-CM

## 2017-05-17 DIAGNOSIS — O26.899 ABDOMINAL PAIN AFFECTING PREGNANCY, ANTEPARTUM: ICD-10-CM

## 2017-05-17 PROBLEM — O36.60X0 LGA (LARGE FOR GESTATIONAL AGE) FETUS AFFECTING MANAGEMENT OF MOTHER: Status: ACTIVE | Noted: 2017-05-17

## 2017-05-17 PROCEDURE — 59025 FETAL NON-STRESS TEST: CPT | Performed by: OBSTETRICS & GYNECOLOGY

## 2017-05-17 PROCEDURE — 76816 OB US FOLLOW-UP PER FETUS: CPT | Performed by: OBSTETRICS & GYNECOLOGY

## 2017-05-17 PROCEDURE — 0502F SUBSEQUENT PRENATAL CARE: CPT | Performed by: OBSTETRICS & GYNECOLOGY

## 2017-06-01 ENCOUNTER — ROUTINE PRENATAL (OUTPATIENT)
Dept: OBSTETRICS AND GYNECOLOGY | Age: 37
End: 2017-06-01

## 2017-06-01 VITALS — SYSTOLIC BLOOD PRESSURE: 118 MMHG | BODY MASS INDEX: 37.75 KG/M2 | DIASTOLIC BLOOD PRESSURE: 80 MMHG | WEIGHT: 241 LBS

## 2017-06-01 DIAGNOSIS — Z3A.34 34 WEEKS GESTATION OF PREGNANCY: ICD-10-CM

## 2017-06-01 DIAGNOSIS — Z34.83 PRENATAL CARE, SUBSEQUENT PREGNANCY, THIRD TRIMESTER: Primary | ICD-10-CM

## 2017-06-01 DIAGNOSIS — O36.63X0 LGA (LARGE FOR GESTATIONAL AGE) FETUS AFFECTING MANAGEMENT OF MOTHER, THIRD TRIMESTER, NOT APPLICABLE OR UNSPECIFIED FETUS: ICD-10-CM

## 2017-06-01 PROCEDURE — 0502F SUBSEQUENT PRENATAL CARE: CPT | Performed by: OBSTETRICS & GYNECOLOGY

## 2017-06-02 NOTE — PROGRESS NOTES
S: Abdominal pain and diarrhea have resolved.  Has gained 7 pounds since last visit 2 weeks ago.  Discussed weight gain.  Having some frequent contractions.  Reviewed signs and symptoms of labor.  O: Cervix is long, fundal shaped, external os 1.5 cm, internal os less than fingertip.  Vertex at -2 station.  A: 34 weeks gestation, episodic contraction activity.    P: If she has runs of hard contractions, she will take her Procardia 10 mg.  She does get bad headaches with it.  RTO in 2 weeks with growth scan.

## 2017-06-16 ENCOUNTER — PROCEDURE VISIT (OUTPATIENT)
Dept: OBSTETRICS AND GYNECOLOGY | Age: 37
End: 2017-06-16

## 2017-06-16 ENCOUNTER — ROUTINE PRENATAL (OUTPATIENT)
Dept: OBSTETRICS AND GYNECOLOGY | Age: 37
End: 2017-06-16

## 2017-06-16 VITALS — SYSTOLIC BLOOD PRESSURE: 122 MMHG | DIASTOLIC BLOOD PRESSURE: 64 MMHG | WEIGHT: 246 LBS | BODY MASS INDEX: 38.53 KG/M2

## 2017-06-16 DIAGNOSIS — O09.523 AMA (ADVANCED MATERNAL AGE) MULTIGRAVIDA 35+, THIRD TRIMESTER: ICD-10-CM

## 2017-06-16 DIAGNOSIS — Z3A.36 36 WEEKS GESTATION OF PREGNANCY: ICD-10-CM

## 2017-06-16 DIAGNOSIS — O36.63X1 LGA (LARGE FOR GESTATIONAL AGE) FETUS AFFECTING MANAGEMENT OF MOTHER, THIRD TRIMESTER, FETUS 1: Primary | ICD-10-CM

## 2017-06-16 DIAGNOSIS — Z36.85 ANTENATAL SCREENING FOR STREPTOCOCCUS B: ICD-10-CM

## 2017-06-16 DIAGNOSIS — Z34.83 ENCOUNTER FOR SUPERVISION OF OTHER NORMAL PREGNANCY, THIRD TRIMESTER: Primary | ICD-10-CM

## 2017-06-16 LAB — EXTERNAL GROUP B STREP ANTIGEN: NEGATIVE

## 2017-06-16 PROCEDURE — 76816 OB US FOLLOW-UP PER FETUS: CPT | Performed by: OBSTETRICS & GYNECOLOGY

## 2017-06-16 PROCEDURE — 0502F SUBSEQUENT PRENATAL CARE: CPT | Performed by: OBSTETRICS & GYNECOLOGY

## 2017-06-16 NOTE — PROGRESS NOTES
Having lots of cramping and some contractions, daily for the last two weeks. No LOF or vag bleeding.  Cervix 40% effaced, 2 cm dilated, -2 station.  Still little posterior.  Vertex presentation.  S&S of labor discussed.  RTO 1 week.

## 2017-06-18 LAB — GP B STREP DNA SPEC QL NAA+PROBE: NEGATIVE

## 2017-06-22 ENCOUNTER — PROCEDURE VISIT (OUTPATIENT)
Dept: OBSTETRICS AND GYNECOLOGY | Age: 37
End: 2017-06-22

## 2017-06-22 ENCOUNTER — ROUTINE PRENATAL (OUTPATIENT)
Dept: OBSTETRICS AND GYNECOLOGY | Age: 37
End: 2017-06-22

## 2017-06-22 VITALS — SYSTOLIC BLOOD PRESSURE: 112 MMHG | DIASTOLIC BLOOD PRESSURE: 74 MMHG | WEIGHT: 245.5 LBS | BODY MASS INDEX: 38.45 KG/M2

## 2017-06-22 DIAGNOSIS — O09.521 AMA (ADVANCED MATERNAL AGE) MULTIGRAVIDA 35+, FIRST TRIMESTER: ICD-10-CM

## 2017-06-22 DIAGNOSIS — Z3A.37 37 WEEKS GESTATION OF PREGNANCY: Primary | ICD-10-CM

## 2017-06-22 DIAGNOSIS — Z3A.37 37 WEEKS GESTATION OF PREGNANCY: ICD-10-CM

## 2017-06-22 DIAGNOSIS — O09.523 AMA (ADVANCED MATERNAL AGE) MULTIGRAVIDA 35+, THIRD TRIMESTER: ICD-10-CM

## 2017-06-22 DIAGNOSIS — Z34.83 PRENATAL CARE, SUBSEQUENT PREGNANCY, THIRD TRIMESTER: Primary | ICD-10-CM

## 2017-06-22 PROCEDURE — 76819 FETAL BIOPHYS PROFIL W/O NST: CPT | Performed by: OBSTETRICS & GYNECOLOGY

## 2017-06-22 PROCEDURE — 0502F SUBSEQUENT PRENATAL CARE: CPT | Performed by: OBSTETRICS & GYNECOLOGY

## 2017-06-25 ENCOUNTER — HOSPITAL ENCOUNTER (OUTPATIENT)
Facility: HOSPITAL | Age: 37
Setting detail: OBSERVATION
Discharge: HOME | End: 2017-06-25
Attending: OBSTETRICS & GYNECOLOGY | Admitting: OBSTETRICS & GYNECOLOGY

## 2017-06-25 VITALS
RESPIRATION RATE: 18 BRPM | HEIGHT: 67 IN | DIASTOLIC BLOOD PRESSURE: 83 MMHG | TEMPERATURE: 98.2 F | OXYGEN SATURATION: 98 % | BODY MASS INDEX: 38.96 KG/M2 | HEART RATE: 81 BPM | SYSTOLIC BLOOD PRESSURE: 121 MMHG | WEIGHT: 248.2 LBS

## 2017-06-25 PROBLEM — Z34.90 PREGNANCY: Status: ACTIVE | Noted: 2017-06-25

## 2017-06-25 PROCEDURE — 59025 FETAL NON-STRESS TEST: CPT

## 2017-06-25 PROCEDURE — 59025 FETAL NON-STRESS TEST: CPT | Performed by: OBSTETRICS & GYNECOLOGY

## 2017-06-25 PROCEDURE — G0378 HOSPITAL OBSERVATION PER HR: HCPCS

## 2017-06-25 RX ORDER — CALCIUM CARBONATE 750 MG/1
750 TABLET, CHEWABLE ORAL AS NEEDED
COMMUNITY
End: 2017-11-17

## 2017-06-26 NOTE — NON STRESS TEST
Clementina Hernandez, a  at 37w3d with an DOUGLAS of 2017, by Last Menstrual Period, was seen at [facility] for a nonstress test.    Chief Complaint   Patient presents with   • Decreased Fetal Movement     very little movement in 24 hours.       Interpretation A  Nonstress Test Interpretation A: Reactive (17 2005 : Lisy Lewis RN)

## 2017-06-30 ENCOUNTER — PROCEDURE VISIT (OUTPATIENT)
Dept: OBSTETRICS AND GYNECOLOGY | Age: 37
End: 2017-06-30

## 2017-06-30 ENCOUNTER — ROUTINE PRENATAL (OUTPATIENT)
Dept: OBSTETRICS AND GYNECOLOGY | Age: 37
End: 2017-06-30

## 2017-06-30 VITALS — WEIGHT: 246 LBS | SYSTOLIC BLOOD PRESSURE: 122 MMHG | BODY MASS INDEX: 38.53 KG/M2 | DIASTOLIC BLOOD PRESSURE: 78 MMHG

## 2017-06-30 DIAGNOSIS — Z3A.38 38 WEEKS GESTATION OF PREGNANCY: Primary | ICD-10-CM

## 2017-06-30 DIAGNOSIS — O09.523 AMA (ADVANCED MATERNAL AGE) MULTIGRAVIDA 35+, THIRD TRIMESTER: ICD-10-CM

## 2017-06-30 DIAGNOSIS — O09.523 AMA (ADVANCED MATERNAL AGE) MULTIGRAVIDA 35+, THIRD TRIMESTER: Primary | ICD-10-CM

## 2017-06-30 DIAGNOSIS — Z3A.38 38 WEEKS GESTATION OF PREGNANCY: ICD-10-CM

## 2017-06-30 PROCEDURE — 0502F SUBSEQUENT PRENATAL CARE: CPT | Performed by: OBSTETRICS & GYNECOLOGY

## 2017-06-30 PROCEDURE — 76818 FETAL BIOPHYS PROFILE W/NST: CPT | Performed by: OBSTETRICS & GYNECOLOGY

## 2017-07-01 ENCOUNTER — HOSPITAL ENCOUNTER (INPATIENT)
Facility: HOSPITAL | Age: 37
LOS: 2 days | Discharge: HOME | End: 2017-07-03
Attending: OBSTETRICS & GYNECOLOGY | Admitting: OBSTETRICS & GYNECOLOGY

## 2017-07-01 ENCOUNTER — HOSPITAL ENCOUNTER (OUTPATIENT)
Dept: LABOR AND DELIVERY | Facility: HOSPITAL | Age: 37
Discharge: HOME | End: 2017-07-01

## 2017-07-01 ENCOUNTER — ANESTHESIA (OUTPATIENT)
Dept: LABOR AND DELIVERY | Facility: HOSPITAL | Age: 37
End: 2017-07-01

## 2017-07-01 ENCOUNTER — ANESTHESIA EVENT (OUTPATIENT)
Dept: LABOR AND DELIVERY | Facility: HOSPITAL | Age: 37
End: 2017-07-01

## 2017-07-01 DIAGNOSIS — O09.899 RUBELLA NON-IMMUNE STATUS, ANTEPARTUM: ICD-10-CM

## 2017-07-01 DIAGNOSIS — Z28.39 RUBELLA NON-IMMUNE STATUS, ANTEPARTUM: ICD-10-CM

## 2017-07-01 PROBLEM — Z34.90 PREGNANCY: Status: RESOLVED | Noted: 2017-06-25 | Resolved: 2017-07-01

## 2017-07-01 PROBLEM — Z3A.38 38 WEEKS GESTATION OF PREGNANCY: Status: ACTIVE | Noted: 2017-02-21

## 2017-07-01 LAB
ABO GROUP BLD: NORMAL
BASOPHILS # BLD AUTO: 0.02 10*3/MM3 (ref 0–0.2)
BASOPHILS NFR BLD AUTO: 0.2 % (ref 0–1.5)
BLD GP AB SCN SERPL QL: NEGATIVE
DEPRECATED RDW RBC AUTO: 40.8 FL (ref 37–54)
EOSINOPHIL # BLD AUTO: 0.09 10*3/MM3 (ref 0–0.7)
EOSINOPHIL NFR BLD AUTO: 0.9 % (ref 0.3–6.2)
ERYTHROCYTE [DISTWIDTH] IN BLOOD BY AUTOMATED COUNT: 14.8 % (ref 11.7–13)
HCT VFR BLD AUTO: 32 % (ref 35.6–45.5)
HGB BLD-MCNC: 10.9 G/DL (ref 11.9–15.5)
IMM GRANULOCYTES # BLD: 0.04 10*3/MM3 (ref 0–0.03)
IMM GRANULOCYTES NFR BLD: 0.4 % (ref 0–0.5)
LYMPHOCYTES # BLD AUTO: 2.27 10*3/MM3 (ref 0.9–4.8)
LYMPHOCYTES NFR BLD AUTO: 22.8 % (ref 19.6–45.3)
MCH RBC QN AUTO: 25.7 PG (ref 26.9–32)
MCHC RBC AUTO-ENTMCNC: 34.1 G/DL (ref 32.4–36.3)
MCV RBC AUTO: 75.5 FL (ref 80.5–98.2)
MONOCYTES # BLD AUTO: 0.64 10*3/MM3 (ref 0.2–1.2)
MONOCYTES NFR BLD AUTO: 6.4 % (ref 5–12)
NEUTROPHILS # BLD AUTO: 6.88 10*3/MM3 (ref 1.9–8.1)
NEUTROPHILS NFR BLD AUTO: 69.3 % (ref 42.7–76)
PLATELET # BLD AUTO: 202 10*3/MM3 (ref 140–500)
PMV BLD AUTO: 10.8 FL (ref 6–12)
RBC # BLD AUTO: 4.24 10*6/MM3 (ref 3.9–5.2)
RH BLD: POSITIVE
WBC NRBC COR # BLD: 9.94 10*3/MM3 (ref 4.5–10.7)

## 2017-07-01 PROCEDURE — 3E033VJ INTRODUCTION OF OTHER HORMONE INTO PERIPHERAL VEIN, PERCUTANEOUS APPROACH: ICD-10-PCS | Performed by: OBSTETRICS & GYNECOLOGY

## 2017-07-01 PROCEDURE — C1755 CATHETER, INTRASPINAL: HCPCS | Performed by: ANESTHESIOLOGY

## 2017-07-01 PROCEDURE — 0HQ9XZZ REPAIR PERINEUM SKIN, EXTERNAL APPROACH: ICD-10-PCS | Performed by: OBSTETRICS & GYNECOLOGY

## 2017-07-01 PROCEDURE — S0260 H&P FOR SURGERY: HCPCS | Performed by: OBSTETRICS & GYNECOLOGY

## 2017-07-01 PROCEDURE — 86901 BLOOD TYPING SEROLOGIC RH(D): CPT | Performed by: OBSTETRICS & GYNECOLOGY

## 2017-07-01 PROCEDURE — 86850 RBC ANTIBODY SCREEN: CPT | Performed by: OBSTETRICS & GYNECOLOGY

## 2017-07-01 PROCEDURE — 59400 OBSTETRICAL CARE: CPT | Performed by: OBSTETRICS & GYNECOLOGY

## 2017-07-01 PROCEDURE — 86900 BLOOD TYPING SEROLOGIC ABO: CPT | Performed by: OBSTETRICS & GYNECOLOGY

## 2017-07-01 PROCEDURE — 85025 COMPLETE CBC W/AUTO DIFF WBC: CPT | Performed by: OBSTETRICS & GYNECOLOGY

## 2017-07-01 PROCEDURE — 25010000002 ROPIVACAINE PER 1 MG: Performed by: ANESTHESIOLOGY

## 2017-07-01 RX ORDER — ACETAMINOPHEN 650 MG/1
650 SUPPOSITORY RECTAL EVERY 4 HOURS PRN
Status: DISCONTINUED | OUTPATIENT
Start: 2017-07-01 | End: 2017-07-01 | Stop reason: HOSPADM

## 2017-07-01 RX ORDER — MISOPROSTOL 200 UG/1
600 TABLET ORAL AS NEEDED
Status: DISCONTINUED | OUTPATIENT
Start: 2017-07-01 | End: 2017-07-01 | Stop reason: HOSPADM

## 2017-07-01 RX ORDER — ONDANSETRON 4 MG/1
4 TABLET, ORALLY DISINTEGRATING ORAL EVERY 6 HOURS PRN
Status: CANCELLED | OUTPATIENT
Start: 2017-07-01

## 2017-07-01 RX ORDER — ONDANSETRON 2 MG/ML
4 INJECTION INTRAMUSCULAR; INTRAVENOUS EVERY 6 HOURS PRN
Status: CANCELLED | OUTPATIENT
Start: 2017-07-01

## 2017-07-01 RX ORDER — ZOLPIDEM TARTRATE 5 MG/1
5 TABLET ORAL NIGHTLY PRN
Status: CANCELLED | OUTPATIENT
Start: 2017-07-01 | End: 2017-07-11

## 2017-07-01 RX ORDER — METHYLERGONOVINE MALEATE 0.2 MG/ML
200 INJECTION INTRAVENOUS AS NEEDED
Status: CANCELLED | OUTPATIENT
Start: 2017-07-01

## 2017-07-01 RX ORDER — ONDANSETRON 2 MG/ML
4 INJECTION INTRAMUSCULAR; INTRAVENOUS EVERY 6 HOURS PRN
Status: DISCONTINUED | OUTPATIENT
Start: 2017-07-01 | End: 2017-07-01 | Stop reason: HOSPADM

## 2017-07-01 RX ORDER — PROMETHAZINE HYDROCHLORIDE 12.5 MG/1
12.5 TABLET ORAL EVERY 4 HOURS PRN
Status: DISCONTINUED | OUTPATIENT
Start: 2017-07-01 | End: 2017-07-03 | Stop reason: HOSPADM

## 2017-07-01 RX ORDER — BUTORPHANOL TARTRATE 1 MG/ML
1 INJECTION, SOLUTION INTRAMUSCULAR; INTRAVENOUS
Status: DISCONTINUED | OUTPATIENT
Start: 2017-07-01 | End: 2017-07-01 | Stop reason: HOSPADM

## 2017-07-01 RX ORDER — EPHEDRINE SULFATE 50 MG/ML
5 INJECTION, SOLUTION INTRAVENOUS AS NEEDED
Status: DISCONTINUED | OUTPATIENT
Start: 2017-07-01 | End: 2017-07-01 | Stop reason: HOSPADM

## 2017-07-01 RX ORDER — FAMOTIDINE 20 MG/1
20 TABLET, FILM COATED ORAL 2 TIMES DAILY
Status: DISCONTINUED | OUTPATIENT
Start: 2017-07-01 | End: 2017-07-01 | Stop reason: HOSPADM

## 2017-07-01 RX ORDER — ACETAMINOPHEN 325 MG/1
650 TABLET ORAL EVERY 4 HOURS PRN
Status: CANCELLED | OUTPATIENT
Start: 2017-07-01

## 2017-07-01 RX ORDER — OXYCODONE HYDROCHLORIDE AND ACETAMINOPHEN 5; 325 MG/1; MG/1
2 TABLET ORAL EVERY 4 HOURS PRN
Status: DISCONTINUED | OUTPATIENT
Start: 2017-07-01 | End: 2017-07-01 | Stop reason: HOSPADM

## 2017-07-01 RX ORDER — OXYTOCIN/RINGER'S LACTATE 10/500ML
999 PLASTIC BAG, INJECTION (ML) INTRAVENOUS ONCE
Status: COMPLETED | OUTPATIENT
Start: 2017-07-01 | End: 2017-07-01

## 2017-07-01 RX ORDER — OXYCODONE HYDROCHLORIDE AND ACETAMINOPHEN 5; 325 MG/1; MG/1
1 TABLET ORAL EVERY 4 HOURS PRN
Status: DISCONTINUED | OUTPATIENT
Start: 2017-07-01 | End: 2017-07-01 | Stop reason: HOSPADM

## 2017-07-01 RX ORDER — ZOLPIDEM TARTRATE 5 MG/1
5 TABLET ORAL NIGHTLY PRN
Status: DISCONTINUED | OUTPATIENT
Start: 2017-07-01 | End: 2017-07-03 | Stop reason: HOSPADM

## 2017-07-01 RX ORDER — ZOLPIDEM TARTRATE 5 MG/1
5 TABLET ORAL NIGHTLY PRN
Status: DISCONTINUED | OUTPATIENT
Start: 2017-07-01 | End: 2017-07-01 | Stop reason: HOSPADM

## 2017-07-01 RX ORDER — DIPHENHYDRAMINE HYDROCHLORIDE 50 MG/ML
25 INJECTION INTRAMUSCULAR; INTRAVENOUS NIGHTLY PRN
Status: DISCONTINUED | OUTPATIENT
Start: 2017-07-01 | End: 2017-07-01 | Stop reason: HOSPADM

## 2017-07-01 RX ORDER — TERBUTALINE SULFATE 1 MG/ML
0.25 INJECTION, SOLUTION SUBCUTANEOUS AS NEEDED
Status: DISCONTINUED | OUTPATIENT
Start: 2017-07-01 | End: 2017-07-01 | Stop reason: HOSPADM

## 2017-07-01 RX ORDER — PROMETHAZINE HYDROCHLORIDE 25 MG/1
12.5 TABLET ORAL EVERY 6 HOURS PRN
Status: CANCELLED | OUTPATIENT
Start: 2017-07-01

## 2017-07-01 RX ORDER — OXYTOCIN/RINGER'S LACTATE 10/500ML
2 PLASTIC BAG, INJECTION (ML) INTRAVENOUS
Status: DISCONTINUED | OUTPATIENT
Start: 2017-07-01 | End: 2017-07-02

## 2017-07-01 RX ORDER — IBUPROFEN 600 MG/1
600 TABLET ORAL EVERY 6 HOURS SCHEDULED
Status: DISCONTINUED | OUTPATIENT
Start: 2017-07-01 | End: 2017-07-03 | Stop reason: HOSPADM

## 2017-07-01 RX ORDER — PROMETHAZINE HYDROCHLORIDE 25 MG/ML
12.5 INJECTION, SOLUTION INTRAMUSCULAR; INTRAVENOUS EVERY 6 HOURS PRN
Status: CANCELLED | OUTPATIENT
Start: 2017-07-01

## 2017-07-01 RX ORDER — ONDANSETRON 4 MG/1
4 TABLET, FILM COATED ORAL EVERY 6 HOURS PRN
Status: DISCONTINUED | OUTPATIENT
Start: 2017-07-01 | End: 2017-07-01 | Stop reason: HOSPADM

## 2017-07-01 RX ORDER — ONDANSETRON 4 MG/1
4 TABLET, ORALLY DISINTEGRATING ORAL EVERY 6 HOURS PRN
Status: DISCONTINUED | OUTPATIENT
Start: 2017-07-01 | End: 2017-07-01 | Stop reason: HOSPADM

## 2017-07-01 RX ORDER — OXYCODONE HYDROCHLORIDE AND ACETAMINOPHEN 5; 325 MG/1; MG/1
1 TABLET ORAL EVERY 4 HOURS PRN
Status: DISCONTINUED | OUTPATIENT
Start: 2017-07-01 | End: 2017-07-03 | Stop reason: HOSPADM

## 2017-07-01 RX ORDER — PHYTONADIONE 1 MG/.5ML
INJECTION, EMULSION INTRAMUSCULAR; INTRAVENOUS; SUBCUTANEOUS
Status: DISPENSED
Start: 2017-07-01 | End: 2017-07-02

## 2017-07-01 RX ORDER — LIDOCAINE HYDROCHLORIDE 10 MG/ML
5 INJECTION, SOLUTION INFILTRATION; PERINEURAL AS NEEDED
Status: DISCONTINUED | OUTPATIENT
Start: 2017-07-01 | End: 2017-07-01 | Stop reason: HOSPADM

## 2017-07-01 RX ORDER — PROMETHAZINE HYDROCHLORIDE 12.5 MG/1
12.5 SUPPOSITORY RECTAL EVERY 6 HOURS PRN
Status: DISCONTINUED | OUTPATIENT
Start: 2017-07-01 | End: 2017-07-01 | Stop reason: HOSPADM

## 2017-07-01 RX ORDER — CARBOPROST TROMETHAMINE 250 UG/ML
250 INJECTION, SOLUTION INTRAMUSCULAR AS NEEDED
Status: CANCELLED | OUTPATIENT
Start: 2017-07-01

## 2017-07-01 RX ORDER — FAMOTIDINE 10 MG/ML
20 INJECTION, SOLUTION INTRAVENOUS 2 TIMES DAILY
Status: DISCONTINUED | OUTPATIENT
Start: 2017-07-01 | End: 2017-07-01 | Stop reason: HOSPADM

## 2017-07-01 RX ORDER — PROMETHAZINE HYDROCHLORIDE 25 MG/ML
12.5 INJECTION, SOLUTION INTRAMUSCULAR; INTRAVENOUS EVERY 4 HOURS PRN
Status: DISCONTINUED | OUTPATIENT
Start: 2017-07-01 | End: 2017-07-03 | Stop reason: HOSPADM

## 2017-07-01 RX ORDER — FAMOTIDINE 10 MG/ML
20 INJECTION, SOLUTION INTRAVENOUS ONCE AS NEEDED
Status: DISCONTINUED | OUTPATIENT
Start: 2017-07-01 | End: 2017-07-01 | Stop reason: HOSPADM

## 2017-07-01 RX ORDER — FAMOTIDINE 20 MG/1
20 TABLET, FILM COATED ORAL ONCE AS NEEDED
Status: CANCELLED | OUTPATIENT
Start: 2017-07-01

## 2017-07-01 RX ORDER — ONDANSETRON 2 MG/ML
4 INJECTION INTRAMUSCULAR; INTRAVENOUS EVERY 6 HOURS PRN
Status: DISCONTINUED | OUTPATIENT
Start: 2017-07-01 | End: 2017-07-03 | Stop reason: HOSPADM

## 2017-07-01 RX ORDER — ROPIVACAINE HYDROCHLORIDE 2 MG/ML
INJECTION, SOLUTION EPIDURAL; INFILTRATION; PERINEURAL AS NEEDED
Status: DISCONTINUED | OUTPATIENT
Start: 2017-07-01 | End: 2017-07-01 | Stop reason: SURG

## 2017-07-01 RX ORDER — ACETAMINOPHEN 325 MG/1
650 TABLET ORAL EVERY 4 HOURS PRN
Status: DISCONTINUED | OUTPATIENT
Start: 2017-07-01 | End: 2017-07-03 | Stop reason: HOSPADM

## 2017-07-01 RX ORDER — PROMETHAZINE HYDROCHLORIDE 25 MG/ML
12.5 INJECTION, SOLUTION INTRAMUSCULAR; INTRAVENOUS EVERY 6 HOURS PRN
Status: DISCONTINUED | OUTPATIENT
Start: 2017-07-01 | End: 2017-07-01 | Stop reason: HOSPADM

## 2017-07-01 RX ORDER — IBUPROFEN 600 MG/1
600 TABLET ORAL EVERY 6 HOURS SCHEDULED
Status: DISCONTINUED | OUTPATIENT
Start: 2017-07-01 | End: 2017-07-01 | Stop reason: HOSPADM

## 2017-07-01 RX ORDER — DIPHENHYDRAMINE HCL 25 MG
25 CAPSULE ORAL NIGHTLY PRN
Status: CANCELLED | OUTPATIENT
Start: 2017-07-01

## 2017-07-01 RX ORDER — DOCUSATE SODIUM 100 MG/1
100 CAPSULE, LIQUID FILLED ORAL 2 TIMES DAILY
Status: DISCONTINUED | OUTPATIENT
Start: 2017-07-01 | End: 2017-07-03 | Stop reason: HOSPADM

## 2017-07-01 RX ORDER — OXYCODONE HYDROCHLORIDE AND ACETAMINOPHEN 5; 325 MG/1; MG/1
2 TABLET ORAL EVERY 4 HOURS PRN
Status: DISCONTINUED | OUTPATIENT
Start: 2017-07-01 | End: 2017-07-03 | Stop reason: HOSPADM

## 2017-07-01 RX ORDER — LANOLIN 100 %
OINTMENT (GRAM) TOPICAL
Status: DISCONTINUED | OUTPATIENT
Start: 2017-07-01 | End: 2017-07-03 | Stop reason: HOSPADM

## 2017-07-01 RX ORDER — DIPHENHYDRAMINE HYDROCHLORIDE 50 MG/ML
25 INJECTION INTRAMUSCULAR; INTRAVENOUS NIGHTLY PRN
Status: CANCELLED | OUTPATIENT
Start: 2017-07-01

## 2017-07-01 RX ORDER — ONDANSETRON 4 MG/1
4 TABLET, FILM COATED ORAL EVERY 6 HOURS PRN
Status: CANCELLED | OUTPATIENT
Start: 2017-07-01

## 2017-07-01 RX ORDER — MISOPROSTOL 200 UG/1
600 TABLET ORAL ONCE AS NEEDED
Status: DISCONTINUED | OUTPATIENT
Start: 2017-07-01 | End: 2017-07-03 | Stop reason: HOSPADM

## 2017-07-01 RX ORDER — ONDANSETRON 2 MG/ML
4 INJECTION INTRAMUSCULAR; INTRAVENOUS ONCE AS NEEDED
Status: DISCONTINUED | OUTPATIENT
Start: 2017-07-01 | End: 2017-07-01 | Stop reason: HOSPADM

## 2017-07-01 RX ORDER — PROMETHAZINE HYDROCHLORIDE 12.5 MG/1
12.5 SUPPOSITORY RECTAL EVERY 6 HOURS PRN
Status: CANCELLED | OUTPATIENT
Start: 2017-07-01

## 2017-07-01 RX ORDER — SODIUM CHLORIDE 0.9 % (FLUSH) 0.9 %
1-10 SYRINGE (ML) INJECTION AS NEEDED
Status: DISCONTINUED | OUTPATIENT
Start: 2017-07-01 | End: 2017-07-03 | Stop reason: HOSPADM

## 2017-07-01 RX ORDER — ONDANSETRON 4 MG/1
4 TABLET, FILM COATED ORAL EVERY 8 HOURS PRN
Status: DISCONTINUED | OUTPATIENT
Start: 2017-07-01 | End: 2017-07-03 | Stop reason: HOSPADM

## 2017-07-01 RX ORDER — DIPHENHYDRAMINE HCL 25 MG
25 CAPSULE ORAL NIGHTLY PRN
Status: DISCONTINUED | OUTPATIENT
Start: 2017-07-01 | End: 2017-07-01 | Stop reason: HOSPADM

## 2017-07-01 RX ORDER — ACETAMINOPHEN 325 MG/1
650 TABLET ORAL EVERY 4 HOURS PRN
Status: DISCONTINUED | OUTPATIENT
Start: 2017-07-01 | End: 2017-07-01 | Stop reason: HOSPADM

## 2017-07-01 RX ORDER — LIDOCAINE HYDROCHLORIDE AND EPINEPHRINE 15; 5 MG/ML; UG/ML
INJECTION, SOLUTION EPIDURAL AS NEEDED
Status: DISCONTINUED | OUTPATIENT
Start: 2017-07-01 | End: 2017-07-01 | Stop reason: SURG

## 2017-07-01 RX ORDER — DIPHENHYDRAMINE HYDROCHLORIDE 50 MG/ML
12.5 INJECTION INTRAMUSCULAR; INTRAVENOUS EVERY 8 HOURS PRN
Status: DISCONTINUED | OUTPATIENT
Start: 2017-07-01 | End: 2017-07-01 | Stop reason: HOSPADM

## 2017-07-01 RX ORDER — OXYTOCIN/RINGER'S LACTATE 10/500ML
125 PLASTIC BAG, INJECTION (ML) INTRAVENOUS CONTINUOUS PRN
Status: DISCONTINUED | OUTPATIENT
Start: 2017-07-01 | End: 2017-07-01 | Stop reason: HOSPADM

## 2017-07-01 RX ORDER — OXYTOCIN/RINGER'S LACTATE 10/500ML
PLASTIC BAG, INJECTION (ML) INTRAVENOUS
Status: COMPLETED
Start: 2017-07-01 | End: 2017-07-01

## 2017-07-01 RX ORDER — PROMETHAZINE HYDROCHLORIDE 25 MG/1
12.5 TABLET ORAL EVERY 6 HOURS PRN
Status: DISCONTINUED | OUTPATIENT
Start: 2017-07-01 | End: 2017-07-01 | Stop reason: HOSPADM

## 2017-07-01 RX ORDER — SODIUM CHLORIDE, SODIUM LACTATE, POTASSIUM CHLORIDE, CALCIUM CHLORIDE 600; 310; 30; 20 MG/100ML; MG/100ML; MG/100ML; MG/100ML
125 INJECTION, SOLUTION INTRAVENOUS CONTINUOUS
Status: DISCONTINUED | OUTPATIENT
Start: 2017-07-01 | End: 2017-07-02

## 2017-07-01 RX ORDER — FAMOTIDINE 10 MG/ML
20 INJECTION, SOLUTION INTRAVENOUS ONCE AS NEEDED
Status: CANCELLED | OUTPATIENT
Start: 2017-07-01

## 2017-07-01 RX ORDER — SODIUM CHLORIDE 0.9 % (FLUSH) 0.9 %
1-10 SYRINGE (ML) INJECTION AS NEEDED
Status: DISCONTINUED | OUTPATIENT
Start: 2017-07-01 | End: 2017-07-01 | Stop reason: HOSPADM

## 2017-07-01 RX ORDER — HYDROCODONE BITARTRATE AND ACETAMINOPHEN 7.5; 325 MG/1; MG/1
1 TABLET ORAL EVERY 4 HOURS PRN
Status: DISCONTINUED | OUTPATIENT
Start: 2017-07-01 | End: 2017-07-01 | Stop reason: HOSPADM

## 2017-07-01 RX ORDER — ERYTHROMYCIN 5 MG/G
OINTMENT OPHTHALMIC
Status: DISPENSED
Start: 2017-07-01 | End: 2017-07-02

## 2017-07-01 RX ORDER — CALCIUM CARBONATE 200(500)MG
2 TABLET,CHEWABLE ORAL 3 TIMES DAILY PRN
Status: DISCONTINUED | OUTPATIENT
Start: 2017-07-01 | End: 2017-07-03 | Stop reason: HOSPADM

## 2017-07-01 RX ADMIN — OXYTOCIN: 10 INJECTION, SOLUTION INTRAMUSCULAR; INTRAVENOUS at 10:30

## 2017-07-01 RX ADMIN — IBUPROFEN 600 MG: 600 TABLET ORAL at 21:07

## 2017-07-01 RX ADMIN — SODIUM CHLORIDE, POTASSIUM CHLORIDE, SODIUM LACTATE AND CALCIUM CHLORIDE 125 ML/HR: 600; 310; 30; 20 INJECTION, SOLUTION INTRAVENOUS at 16:50

## 2017-07-01 RX ADMIN — OXYCODONE HYDROCHLORIDE AND ACETAMINOPHEN 1 TABLET: 5; 325 TABLET ORAL at 21:07

## 2017-07-01 RX ADMIN — ROPIVACAINE HYDROCHLORIDE 10 ML: 2 INJECTION, SOLUTION EPIDURAL; INFILTRATION at 18:47

## 2017-07-01 RX ADMIN — OXYTOCIN 2 MILLI-UNITS/MIN: 10 INJECTION, SOLUTION INTRAMUSCULAR; INTRAVENOUS at 10:36

## 2017-07-01 RX ADMIN — SODIUM CHLORIDE, POTASSIUM CHLORIDE, SODIUM LACTATE AND CALCIUM CHLORIDE 125 ML/HR: 600; 310; 30; 20 INJECTION, SOLUTION INTRAVENOUS at 09:30

## 2017-07-01 RX ADMIN — Medication: at 10:30

## 2017-07-01 RX ADMIN — LIDOCAINE HYDROCHLORIDE AND EPINEPHRINE 3 ML: 15; 5 INJECTION, SOLUTION EPIDURAL at 18:42

## 2017-07-01 RX ADMIN — FAMOTIDINE 20 MG: 20 TABLET, FILM COATED ORAL at 09:52

## 2017-07-01 RX ADMIN — Medication 10 ML/HR: at 18:47

## 2017-07-01 NOTE — PLAN OF CARE
Problem: Patient Care Overview (Adult)  Goal: Plan of Care Review  Outcome: Ongoing (interventions implemented as appropriate)    17 1304   Coping/Psychosocial Response Interventions   Plan Of Care Reviewed With patient;spouse   Patient Care Overview   Progress improving       Goal: Adult Individualization and Mutuality  Outcome: Ongoing (interventions implemented as appropriate)    17 1304   Individualization   Patient Specific Preferences epidural vs IV analgesia   Patient Specific Goals healthy    Patient Specific Interventions up in chair for increased comfort during induction with EFM   Mutuality/Individual Preferences   What Information Would Help Us Give You More Personalized Care? younger daughter- 13 y.o. at delivery         Problem: Labor (Cervical Ripen, Induct, Augment) (Adult,Obstetrics,Pediatric)  Goal: Signs and Symptoms of Listed Potential Problems Will be Absent or Manageable (Labor)  Outcome: Ongoing (interventions implemented as appropriate)    17 1304   Labor (Cervical Ripen, Induct, Augment)   Problems Assessed (Labor) all   Problems Present (Labor) none

## 2017-07-01 NOTE — ANESTHESIA PROCEDURE NOTES
Labor Epidural    Patient location during procedure: OB  Performed By  Anesthesiologist: ANGELICA JONES  Preanesthetic Checklist  Completed: patient identified, surgical consent, pre-op evaluation, IV checked, risks and benefits discussed and monitors and equipment checked  Epidural Block Prep:  Pt Position:sitting  Sterile Tech:cap, gloves, mask and sterile barrier  Prep:chlorhexidine gluconate and isopropyl alcohol  Monitoring:blood pressure monitoring and continuous pulse oximetry  Epidural Block Procedure:  Approach:midline  Guidance:landmark technique  Needle Type:Tuohy  Needle Gauge:17  Aspiration:negative  Test Dose:negative  Number of Attempts: 1  Post Assessment:  Dressing:occlusive dressing applied and secured with tape  Pt Tolerance:patient tolerated the procedure well with no apparent complications  Complications:no

## 2017-07-01 NOTE — ANESTHESIA PREPROCEDURE EVALUATION
Anesthesia Evaluation            Airway   Mallampati: II  TM distance: >3 FB  Neck ROM: full  no difficulty expected  Dental - normal exam     Pulmonary - normal exam   Cardiovascular - normal exam    (+) hypertension,       Neuro/Psych  (+) headaches,    GI/Hepatic/Renal/Endo    (+)  hypothyroidism,     Musculoskeletal     Abdominal   (+) obese,    Substance History      OB/GYN    (+) Pregnant,         Other      history of cancer                                    Anesthesia Plan    ASA 3     epidural     intravenous induction   Anesthetic plan and risks discussed with patient.

## 2017-07-01 NOTE — PROGRESS NOTES
[facility]  [facility]    LABOR AND DELIVERY PROGRESS NOTE               2017    Clementina Hernandez is a 37 y.o. Female, , at 38w2d   Chief Complaint   Patient presents with   • Scheduled Induction            Patient Active Problem List   Diagnosis   • Abnormal finding on thyroid function test   • Breast injury   • Chest pain   • Accumulation of fluid in tissues   • Fatigue   • Family history of coronary artery disease   • Personal history of other specified conditions   • Benign hypertension   • Hyperlipidemia   • HLD (hyperlipidemia)   • Adult body mass index greater than 30   • Adult hypothyroidism   • Schwannoma of spinal cord   • Neurilemmoma   • Staph skin infection   • Subclinical hypothyroidism   • Avitaminosis D   • 26 weeks gestation of pregnancy   • Encounter for supervision of other normal pregnancy, second trimester   • LGA (large for gestational age) fetus affecting management of mother   • Pregnancy       Pitocin at 6 mu/min.    Subjective: A little uncomfortable.    Objective: AF/VSS.  Regular ctx. Reactive FHT  Cx 70%, 3-4cm, -1 station.  AROM, clear fluid.    Assessment: AROM    Plan: Pitocin, Epidural.    Dru Dawson MD  2017  (5:00 PM)

## 2017-07-02 PROBLEM — Z28.39 RUBELLA NON-IMMUNE STATUS, ANTEPARTUM: Status: ACTIVE | Noted: 2017-07-02

## 2017-07-02 PROBLEM — O09.899 RUBELLA NON-IMMUNE STATUS, ANTEPARTUM: Status: ACTIVE | Noted: 2017-07-02

## 2017-07-02 LAB
BASOPHILS # BLD AUTO: 0.01 10*3/MM3 (ref 0–0.2)
BASOPHILS NFR BLD AUTO: 0.1 % (ref 0–1.5)
DEPRECATED RDW RBC AUTO: 41.8 FL (ref 37–54)
EOSINOPHIL # BLD AUTO: 0.04 10*3/MM3 (ref 0–0.7)
EOSINOPHIL NFR BLD AUTO: 0.3 % (ref 0.3–6.2)
ERYTHROCYTE [DISTWIDTH] IN BLOOD BY AUTOMATED COUNT: 14.6 % (ref 11.7–13)
HCT VFR BLD AUTO: 30.3 % (ref 35.6–45.5)
HGB BLD-MCNC: 9.9 G/DL (ref 11.9–15.5)
IMM GRANULOCYTES # BLD: 0.04 10*3/MM3 (ref 0–0.03)
IMM GRANULOCYTES NFR BLD: 0.3 % (ref 0–0.5)
LYMPHOCYTES # BLD AUTO: 2.27 10*3/MM3 (ref 0.9–4.8)
LYMPHOCYTES NFR BLD AUTO: 18 % (ref 19.6–45.3)
MCH RBC QN AUTO: 25.8 PG (ref 26.9–32)
MCHC RBC AUTO-ENTMCNC: 32.7 G/DL (ref 32.4–36.3)
MCV RBC AUTO: 79.1 FL (ref 80.5–98.2)
MONOCYTES # BLD AUTO: 0.46 10*3/MM3 (ref 0.2–1.2)
MONOCYTES NFR BLD AUTO: 3.7 % (ref 5–12)
NEUTROPHILS # BLD AUTO: 9.76 10*3/MM3 (ref 1.9–8.1)
NEUTROPHILS NFR BLD AUTO: 77.6 % (ref 42.7–76)
PLATELET # BLD AUTO: 186 10*3/MM3 (ref 140–500)
PMV BLD AUTO: 11.9 FL (ref 6–12)
RBC # BLD AUTO: 3.83 10*6/MM3 (ref 3.9–5.2)
WBC NRBC COR # BLD: 12.58 10*3/MM3 (ref 4.5–10.7)

## 2017-07-02 PROCEDURE — 85025 COMPLETE CBC W/AUTO DIFF WBC: CPT | Performed by: OBSTETRICS & GYNECOLOGY

## 2017-07-02 RX ADMIN — DOCUSATE SODIUM 100 MG: 100 CAPSULE, LIQUID FILLED ORAL at 09:53

## 2017-07-02 RX ADMIN — IBUPROFEN 600 MG: 600 TABLET ORAL at 15:04

## 2017-07-02 RX ADMIN — IBUPROFEN 600 MG: 600 TABLET ORAL at 09:53

## 2017-07-02 RX ADMIN — DOCUSATE SODIUM 100 MG: 100 CAPSULE, LIQUID FILLED ORAL at 18:11

## 2017-07-02 RX ADMIN — OXYCODONE HYDROCHLORIDE AND ACETAMINOPHEN 2 TABLET: 5; 325 TABLET ORAL at 05:29

## 2017-07-02 RX ADMIN — IBUPROFEN 600 MG: 600 TABLET ORAL at 22:24

## 2017-07-02 RX ADMIN — IBUPROFEN 600 MG: 600 TABLET ORAL at 04:01

## 2017-07-02 RX ADMIN — OXYCODONE HYDROCHLORIDE AND ACETAMINOPHEN 2 TABLET: 5; 325 TABLET ORAL at 01:12

## 2017-07-02 NOTE — PLAN OF CARE
Problem: Patient Care Overview (Adult)  Goal: Plan of Care Review  Outcome: Ongoing (interventions implemented as appropriate)    17   Coping/Psychosocial Response Interventions   Plan Of Care Reviewed With patient   Patient Care Overview   Progress improving   Outcome Evaluation   Outcome Summary/Follow up Plan Pain controlled by PO meds, ambulates and voids on own, breastfeeding improving with time, ice packs for valeria comfort.       Goal: Adult Individualization and Mutuality  Outcome: Ongoing (interventions implemented as appropriate)  Goal: Discharge Needs Assessment  Outcome: Ongoing (interventions implemented as appropriate)    17   Discharge Needs Assessment   Concerns To Be Addressed no discharge needs identified         Problem: Postpartum, Vaginal Delivery (Adult)  Goal: Signs and Symptoms of Listed Potential Problems Will be Absent or Manageable (Postpartum, Vaginal Delivery)  Outcome: Ongoing (interventions implemented as appropriate)    17   Postpartum, Vaginal Delivery   Problems Assessed (Postpartum Vaginal Delivery) all   Problems Present (Postpartum Vaginal Delivery) pain         Problem: Breastfeeding (Adult,NICU,[place],Obstetrics,Pediatric)  Goal: Signs and Symptoms of Listed Potential Problems Will be Absent or Manageable (Breastfeeding)  Outcome: Ongoing (interventions implemented as appropriate)    17   Breastfeeding   Problems Assessed (Breastfeeding) all   Problems Present (Breastfeeding) ineffective breastfeeding

## 2017-07-02 NOTE — OP NOTE
[facility]    2017    Patient:Clementina Hernandez    MR#:[id number]    Vaginal Delivery Note                               2017  37 y.o. yo female  at 38w2d    Patient Active Problem List   Diagnosis   • Breast injury   • Chest pain   • Accumulation of fluid in tissues   • Fatigue   • Family history of coronary artery disease   • Personal history of other specified conditions   • Benign hypertension   • Hyperlipidemia   • Adult body mass index greater than 30   • Adult hypothyroidism   • Schwannoma of spinal cord   • Neurilemmoma   • Staph skin infection   • Subclinical hypothyroidism   • Avitaminosis D   • 38 weeks gestation of pregnancy   • Encounter for supervision of other normal pregnancy, second trimester   • LGA (large for gestational age) fetus affecting management of mother       Delivery     Delivery: Vaginal, Spontaneous Delivery     YOB: 2015    Time of Birth: 7:36 PM      Anesthesia: Epidural     Delivering clinician: Dru Dawson    Forceps?   No   Vacuum? No    Shoulder dystocia present: No        Delivery Narrative:         Antepartum summary: Clementina Hernandez is a 37-year-old white female, G5, , admitted for induction of labor at 38 weeks 2 days gestation because of decreased fetal movement for about the last week.  Biophysical profiles and nonstress tests have been normal but her last 2 pregnancies both delivered at 36 weeks, and her 2 previous pregnancies delivered first at 38 weeks and then the second at 39 weeks.  Her last delivery in this hospital was attended by the labor and delivery nurse as I was trying to get dressed!!                   On admission she was 3-4 cm dilated at 0900 and was begun on Pitocin 2 mU/m.  I ruptured membranes at 1643 with return of clear fluid, and she was still 3-4 cm dilated.  She had epidural anesthesia at 1830 at which time she was 4 cm dilated.  And then in  she was completely  dilated.  Fetal heart tones were reassuring.  She was prepped for delivery.       Delivery summary: Clementina Goodman was prepped and draped.  With a single push she spontaneously delivered the vertex.  Oropharynx was suctioned followed by easy delivery of a normal-appearing male who weighed 8 pounds 3.4 ounces and had Apgars of 8 and 9.  Cord had 3 vessels.  The baby is placed on the abdomen to begin Kangaroo care.  Placenta delivered by simple traction and a Credé maneuver and was intact and appeared normal.  I attempted manual exploration was really couldn't get  more than 2 fingers through the internal os of the cervix as it was ciara down so well.  There were no cervical lacerations or upper vaginal lacerations and only a small first-degree left periurethral laceration closed with 3-0 chromic.  The patient tolerated the procedure well.   cc's.    Infant    Findings: male  infant     Infant observations: Weight: 8 lb 3.4 oz (3.726 kg)     Observations/Comments:  Infant Scale 1      Apgars: 8   @ 1 minute /    9   @ 5 minutes   Infant Name:      Placenta, Cord, and Fluid    Placenta delivered  Spontaneous  at  7/1  7:42 PM     Cord: 3 vessels  present.   Nuchal Cord?  no   Cord blood obtained: Yes    Cord gases obtained:  No              Repair    Episiotomy: No   Lacerations: Yes  Laceration Information  Laceration Repaired?   Perineal: None       Periurethral: left  Yes    Labial:         Sulcus:         Vaginal:         Cervical:            Estimated Blood Loss: 300  mls.   Suture used for repair: 3-0 chromic gut .     Complications  none    Disposition  Mother to Mother Baby/Postpartum  in stable condition currently.  Baby to NBN  in stable condition currently.      [x]  Labs reviewed:  Bld B Positive   Rubella NOT IMMUNE       Varicella Immune        Tdap Not given yet.          Dru Dawson MD  07/01/17  10:07 PM

## 2017-07-02 NOTE — NURSING NOTE
Patient was having variable decelerations upon RN coming on shift. Patient repositioned to from side lying left to side lying right then back to side lying left (see labor record in flowsheets). Checked patient's cervix and patient 10 cm/100%/1.

## 2017-07-02 NOTE — LACTATION NOTE
P5. 38wkr. Pt states last two babies born at 36 wks ended up jaundice and she wants to use HGP.  Discussed with pt a hand pump (or bringing personal pump in) would do well for her this time as she has great history of milk supply.  We can re-evaluate if infant remains sleepy.  Rx for personal pump given if she desires to get a new pump.

## 2017-07-02 NOTE — PLAN OF CARE
Problem: Patient Care Overview (Adult)  Goal: Plan of Care Review  Outcome: Ongoing (interventions implemented as appropriate)    17   Coping/Psychosocial Response Interventions   Plan Of Care Reviewed With patient;spouse;daughter   Patient Care Overview   Progress improving       Goal: Adult Individualization and Mutuality  Outcome: Ongoing (interventions implemented as appropriate)    17   Individualization   Patient Specific Preferences Safe vaginal delivery, TARUN, Daughter to cut cord, breastfeeding   Patient Specific Goals Pain rating less than 4 postpartum; breastfeeding within one hour of delivery.   Patient Specific Interventions Epidural for cx, oral pain medication for postpartum pain   Mutuality/Individual Preferences   What Anxieties, Fears or Concerns Do You Have About Your Health or Care? none at this time   What Questions Do You Have About Your Health or Care? none at this time   What Information Would Help Us Give You More Personalized Care? none at this time       Goal: Discharge Needs Assessment  Outcome: Ongoing (interventions implemented as appropriate)    17 1042 17 1050 17   Discharge Needs Assessment   Concerns To Be Addressed no discharge needs identified --  --    Readmission Within The Last 30 Days no previous admission in last 30 days --  --    Equipment Needed After Discharge none --  --    Discharge Disposition --  --  home or self-care   Living Environment   Transportation Available car --  --    Self-Care   Equipment Currently Used at Home --  none --          Problem: Labor (Cervical Ripen, Induct, Augment) (Adult,Obstetrics,Pediatric)  Goal: Signs and Symptoms of Listed Potential Problems Will be Absent or Manageable (Labor)  Outcome: Outcome(s) achieved Date Met:  17   Labor (Cervical Ripen, Induct, Augment)   Problems Assessed (Labor) all   Problems Present (Labor) pain         Problem: Fall Risk   (Adult,Obstetrics,Pediatric)  Goal: Identify Related Risk Factors and Signs and Symptoms  Outcome: Ongoing (interventions implemented as appropriate)    17   Fall Risk    Fall Risk: Related Risk Factors regional anesthesia    Fall Risk: Signs and Symptoms presence of fall risk factors       Goal: Absence of Maternal Fall  Outcome: Ongoing (interventions implemented as appropriate)    17   Fall Risk  (Adult,Obstetrics,Pediatric)   Absence of Maternal Fall achieves outcome       Goal: Absence of  Fall/Drop  Outcome: Ongoing (interventions implemented as appropriate)    17   Fall Risk  (Adult,Obstetrics,Pediatric)   Absence of [place] Fall/Drop achieves outcome         Problem: Postpartum, Vaginal Delivery (Adult)  Goal: Signs and Symptoms of Listed Potential Problems Will be Absent or Manageable (Postpartum, Vaginal Delivery)  Outcome: Ongoing (interventions implemented as appropriate)    17   Postpartum, Vaginal Delivery   Problems Assessed (Postpartum Vaginal Delivery) all   Problems Present (Postpartum Vaginal Delivery) pain         Problem: Anesthesia/Analgesia, Neuraxial (Obstetrics)  Goal: Signs and Symptoms of Listed Potential Problems Will be Absent or Manageable (Anesthesia/Analgesia, Neuraxial)  Outcome: Ongoing (interventions implemented as appropriate)    17   Anesthesia/Analgesia, Neuraxial   Problems Assessed (Neuraxial Anesthesia/Analgesia, OB) all   Problems Present (Neuraxial Anesthesia/Analgesia, OB) none

## 2017-07-02 NOTE — PROGRESS NOTES
"[facility]  Vaginal Delivery Progress Note    Subjective   Postpartum Day 1: Vaginal Delivery    The patient feels well.  Her pain is well controlled with prescribed pain medications.   She is ambulating well.  Patient describes her bleeding as thin lochia.    Breastfeeding: infant latching.    Objective     Vital Signs Range for the last 24 hours  Temperature: Temp:  [97.5 °F (36.4 °C)-99.1 °F (37.3 °C)] 98.4 °F (36.9 °C)   Temp Source: Temp src: Oral   BP: BP: ()/(55-82) 94/62   Pulse: Heart Rate:  [] 80   Respirations: Resp:  [16-18] 16   SPO2: SpO2:  [99 %-100 %] 99 %   O2 Amount (l/min):     O2 Devices O2 Device: room air   Weight:       Admit Height:  Height: 67\" (170.2 cm)      Physical Exam:  General:  no acute distresss.  Abdomen: abdomen is soft without significant tenderness, masses, organomegaly or guarding. Fundus: appropriate, firm, non tender  Extremities: normal, atraumatic, no cyanosis, and trace edema.        Lab results reviewed:  Yes   Rubella:  Non-immune Nurse Transcribed from prenatal record --    Rh Status:    RH type   Date Value Ref Range Status   07/01/2017 Positive  Final     Immunizations: There is no immunization history for the selected administration types on file for this patient.    Assessment/Plan     Active Problems:    Rubella non-immune status, antepartum      Clementina Hernandez is Day 1  post-partum  Vaginal, Spontaneous Delivery    .      Plan:  Continue current care.   Rubella vaccine ordered   Pt declines elective circ    Radha Montesinos MD  7/2/2017  12:14 PM  "

## 2017-07-03 VITALS
BODY MASS INDEX: 38.58 KG/M2 | HEART RATE: 81 BPM | SYSTOLIC BLOOD PRESSURE: 137 MMHG | WEIGHT: 245.81 LBS | OXYGEN SATURATION: 99 % | DIASTOLIC BLOOD PRESSURE: 86 MMHG | RESPIRATION RATE: 16 BRPM | HEIGHT: 67 IN | TEMPERATURE: 98.2 F

## 2017-07-03 RX ORDER — IBUPROFEN 600 MG/1
600 TABLET ORAL EVERY 6 HOURS SCHEDULED
Qty: 30 TABLET | Refills: 0 | Status: SHIPPED | OUTPATIENT
Start: 2017-07-03 | End: 2019-06-25

## 2017-07-03 RX ADMIN — IBUPROFEN 600 MG: 600 TABLET ORAL at 04:47

## 2017-07-03 RX ADMIN — MEASLES, MUMPS, AND RUBELLA VIRUS VACCINE LIVE 0.5 ML: 1000; 12500; 1000 INJECTION, POWDER, LYOPHILIZED, FOR SUSPENSION SUBCUTANEOUS at 10:05

## 2017-07-03 RX ADMIN — IBUPROFEN 600 MG: 600 TABLET ORAL at 08:59

## 2017-07-03 RX ADMIN — DOCUSATE SODIUM 100 MG: 100 CAPSULE, LIQUID FILLED ORAL at 08:59

## 2017-07-03 NOTE — LACTATION NOTE
This note was copied from a baby's chart.  P5. Mom feels breastfeeding is going well and she denies questions presently. Has card for [facility]

## 2017-07-03 NOTE — DISCHARGE SUMMARY
[facility]  Vaginal delivery Discharge Summary      Date of Admission: 2017    Date of Discharge:  7/3/2017    Patient: Clementina Hernandez      MR#:[id number]    Surgeon/OB: Dru Dawson     Discharge Diagnosis: Vaginal Delivery at 38w2d, uncomplicated recovery    Procedures:  Vaginal, Spontaneous Delivery     2017    7:36 PM      Anesthesia:  Epidural     Presenting Problem/History of Present Illness  Pregnancy [Z33.1]   (spontaneous vaginal delivery) [O80]     Patient Active Problem List   Diagnosis   • Breast injury   • Chest pain   • Accumulation of fluid in tissues   • Fatigue   • Family history of coronary artery disease   • Personal history of other specified conditions   • Benign hypertension   • Hyperlipidemia   • Adult body mass index greater than 30   • Adult hypothyroidism   • Schwannoma of spinal cord   • Neurilemmoma   • Staph skin infection   • Subclinical hypothyroidism   • Avitaminosis D   • 38 weeks gestation of pregnancy   • Encounter for supervision of other normal pregnancy, second trimester   • LGA (large for gestational age) fetus affecting management of mother   • Rubella non-immune status, antepartum       Hospital Course  Patient is a 37 y.o. female  at 38w2d status post vaginal delivery.    Uneventful recovery.  Patient is ambulating, tolerating a regular diet.  Perineum is intact.    Infant:   male  fetus 8 lb 3.4 oz (3.726 kg)  with Apgar scores of 8  , 9   at five minutes.    Condition on Discharge:  Stable    Vital Signs  Temp:  [98.2 °F (36.8 °C)-99 °F (37.2 °C)] 98.2 °F (36.8 °C)  Heart Rate:  [73-85] 81  Resp:  [16-20] 16  BP: (117-137)/(77-86) 137/86    Lab Results   Component Value Date    WBC 12.58 (H) 2017    HGB 9.9 (L) 2017    HCT 30.3 (L) 2017    MCV 79.1 (L) 2017     2017       Discharge Disposition  Home or Self Care    Discharge Medications   Clementina Hernandez   Home  Medication Instructions [facility]:[id number]    Printed on:07/03/17 0804   Medication Information                      calcium carbonate EX (TUMS EX) 750 MG chewable tablet  Chew 750 mg As Needed for Indigestion or Heartburn.             ibuprofen (ADVIL,MOTRIN) 600 MG tablet  Take 1 tablet by mouth Every 6 (Six) Hours.             Prenatal Vit-Fe Phos-FA-Omega (VITAFOL GUMMIES) 3.33-0.333-34.8 MG chewable tablet  Chew 3 tablets Daily.                 Discharge Diet: Regular    Activity at Discharge:     Follow-up Appointments 6 weeks        Prenatal labs/vax:     Marlon Ospina MD  07/03/17  8:04 AM

## 2017-07-03 NOTE — PLAN OF CARE
Problem: Patient Care Overview (Adult)  Goal: Plan of Care Review  Outcome: Ongoing (interventions implemented as appropriate)    17 0319   Coping/Psychosocial Response Interventions   Plan Of Care Reviewed With patient   Patient Care Overview   Progress improving   Outcome Evaluation   Outcome Summary/Follow up Plan V/S stable, pain controlled with PO meds, sitz bath tonight, scant rubra, no issues, plans D/C home today       Goal: Adult Individualization and Mutuality  Outcome: Ongoing (interventions implemented as appropriate)  Goal: Discharge Needs Assessment  Outcome: Ongoing (interventions implemented as appropriate)    Problem: Fall Risk  (Adult,Obstetrics,Pediatric)  Goal: Identify Related Risk Factors and Signs and Symptoms  Outcome: Ongoing (interventions implemented as appropriate)  Goal: Absence of Maternal Fall  Outcome: Ongoing (interventions implemented as appropriate)  Goal: Absence of  Fall/Drop  Outcome: Ongoing (interventions implemented as appropriate)    Problem: Postpartum, Vaginal Delivery (Adult)  Goal: Signs and Symptoms of Listed Potential Problems Will be Absent or Manageable (Postpartum, Vaginal Delivery)  Outcome: Ongoing (interventions implemented as appropriate)    Problem: Breastfeeding (Adult,NICU,[place],Obstetrics,Pediatric)  Goal: Signs and Symptoms of Listed Potential Problems Will be Absent or Manageable (Breastfeeding)  Outcome: Ongoing (interventions implemented as appropriate)

## 2017-07-03 NOTE — PROGRESS NOTES
Postpartum Vaginal Delivery Note PPD# 2    Subjective:  Patient complains of sciatica pain that has been bothering her throughout her pregnancy.  The pain radiates around and down her legs.  She is interested in considering possible physical therapy.  She declines to take any narcotics.  Lochia normal. Ambulating well. Tolerating regular diet. Voiding without difficulty.    Vitals:   Patient Vitals for the past 24 hrs:   BP Temp Temp src Pulse Resp   07/03/17 0747 137/86 98.2 °F (36.8 °C) Oral 81 16   07/02/17 2320 117/81 99 °F (37.2 °C) Oral 85 18   07/02/17 1453 120/77 98.5 °F (36.9 °C) Oral 73 20         Exam:   Gen: NAD, cooperative, conversive  Abd: Soft, nondistended, fundus is firm below umbillicus, nontender  Ext: Nontender bilaterally, 1+ edema  Labs:  Recent Results (from the past 36 hour(s))   CBC Auto Differential    Collection Time: 07/02/17  6:59 AM   Result Value Ref Range    WBC 12.58 (H) 4.50 - 10.70 10*3/mm3    RBC 3.83 (L) 3.90 - 5.20 10*6/mm3    Hemoglobin 9.9 (L) 11.9 - 15.5 g/dL    Hematocrit 30.3 (L) 35.6 - 45.5 %    MCV 79.1 (L) 80.5 - 98.2 fL    MCH 25.8 (L) 26.9 - 32.0 pg    MCHC 32.7 32.4 - 36.3 g/dL    RDW 14.6 (H) 11.7 - 13.0 %    RDW-SD 41.8 37.0 - 54.0 fl    MPV 11.9 6.0 - 12.0 fL    Platelets 186 140 - 500 10*3/mm3    Neutrophil % 77.6 (H) 42.7 - 76.0 %    Lymphocyte % 18.0 (L) 19.6 - 45.3 %    Monocyte % 3.7 (L) 5.0 - 12.0 %    Eosinophil % 0.3 0.3 - 6.2 %    Basophil % 0.1 0.0 - 1.5 %    Immature Grans % 0.3 0.0 - 0.5 %    Neutrophils, Absolute 9.76 (H) 1.90 - 8.10 10*3/mm3    Lymphocytes, Absolute 2.27 0.90 - 4.80 10*3/mm3    Monocytes, Absolute 0.46 0.20 - 1.20 10*3/mm3    Eosinophils, Absolute 0.04 0.00 - 0.70 10*3/mm3    Basophils, Absolute 0.01 0.00 - 0.20 10*3/mm3    Immature Grans, Absolute 0.04 (H) 0.00 - 0.03 10*3/mm3     Patient Active Problem List   Diagnosis   • Breast injury   • Chest pain   • Accumulation of fluid in tissues   • Fatigue   • Family history of coronary  artery disease   • Personal history of other specified conditions   • Benign hypertension   • Hyperlipidemia   • Adult body mass index greater than 30   • Adult hypothyroidism   • Schwannoma of spinal cord   • Neurilemmoma   • Staph skin infection   • Subclinical hypothyroidism   • Avitaminosis D   • 38 weeks gestation of pregnancy   • Encounter for supervision of other normal pregnancy, second trimester   • LGA (large for gestational age) fetus affecting management of mother   • Rubella non-immune status, antepartum       Assessment and Plan:  Clementina Hernandez is a 37 y.o. female  s/p   1. Doing well except sciatic pain.  I will order a referral to physical therapy.  We discussed heat stretching and NSAID d/c home today  2. Precautions given.  Patient has a history of elevated blood pressure but blood pressure is normal now.  She was told to call with headaches blurry vision or scotoma.  3. RTC in 6 weeks.   4. Ambulation encouraged.         Signed By:  Marlon Ospina MD       July 3, 2017 7:51 AM

## 2017-07-04 NOTE — H&P
[facility]  [facility]    Obstetric History and Physical                   7/4/2017    Chief Complaint   Patient presents with   • Scheduled Induction       Subjective     Patient is a 37 y.o. female G5,  currently at 38w2d, who presents with History of decreased fetal movement for the last week.  Her first pregnancy delivered at 38 weeks, the next at 39 weeks, then she had to the delivered 36 weeks.  She has been measuring large for gestational age but  had a normal glucose tolerance test.  She has been very uncomfortable recently in the pregnancy and I feel is best to go ahead and induce her labor.  Her last delivery was a precipitous , in the hospital, delivered by the labor and delivery nurse.    Her prenatal care is benign.      Her previous obstetric/gynecological history is noted for is remarkable for the last 2 babies delivered prematurely at 36 weeks, the very last one precipitously in bed, in the hospital, after her epidural, without her realization that anything was happening. Attended by the nurse.    The following portions of the patients history were reviewed and updated as appropriate: current medications, allergies, past medical history, past surgical history, past family history, past social history and problem list .       Prenatal Information:  Prenatal Results         1st Trimester Ref. Range Date Time   CBC with auto diff ^ WBC=10.9; H/H=14.5/43.9; Lkwt=244.   12/15/16    Rubella IgG  <0.90 index (L) Immune >0.99 index 12/15/16 1313   Hepatitis B SAg  Negative  Negative 12/15/16 1313   RPR  Non Reactive  Non Reactive 12/15/16 1313   ABO  B   07/01/17 0944   Rh  Positive   07/01/17 0944   Anibody Screen  Negative   07/01/17 0944   HIV  Non Reactive  Non Reactive 12/15/16 1313   Varicella IgG ^ Immune   12/15/16    Urinalysis with microscopy       Urine Culture ^ No Growth.   12/15/16    GC/Chlamydia/TV ^ Neg GC / Neg CT   12/15/16    ThinPrep/Pap ^ Negative   12/15/16     2nd and 3rd Trimester Ref. Range Date Time   Hemoglobin / Hematocrit  30.3 % (L) 35.6 - 45.5 % 07/02/17 0659   Hemoglobin  9.9 g/dL (L) 11.9 - 15.5 g/dL 07/02/17 0659   Group B Strep Culture ^ Negative   06/16/17    Glucose Challenge Test 1 Hr       Glucose Fasting       Glucose 1 Hr       Glucose 2 Hr       Glucose 3 Hr       Pre-eclampsia Panel       Risk Screening Ref. Range Date Time   Fetal Fibronectin       Amnisure       Hepatitis C Antibody  <0.1 s/co ratio 0.0 - 0.9 s/co ratio 12/15/16 1313   Hemoglobin electrophoresis       Cystic Fibrosis       Hemoglobin A1C       MSAFP - 4       NIPT       AFP       Parvovirus IgG       Parvovirus IgM       POCT - glucose       [facility]       24 Hour urine - Total protein       24 Hour urine - Creatinine clearance       Urinalysis with microscopy       Urine Culture ^ No Growth.   12/15/16    Drug Screening Ref. Range Date Time   Amphetamine Screen       Barbiturate Screen       Benzodiazepine Screen       Methadone Screen       Phencyclidine Screen       Opiates Screen       THC Screen       Cocaine Screen       Propoxyphene Screen       Buprenorphine Screen       Methamphetamine Screen       Oxycodone Screen       Tryicyclic Antidepressants Screen              Legend: ^: Historical            View all results for this pregnancy        External Prenatal Results         Pregnancy Outside Results - these were transcribed from office records.  See scanned records for details. Date Time   Hgb      Hct      ABO ^ B  12/15/16    Rh ^ Positive  12/15/16    Antibody Screen ^ Negative  12/15/16    Glucose Fasting GTT      Glucose Tolerance Test 1 hour      Glucose Tolerance Test 3 hour      Gonorrhea (discrete) ^ Negative  12/15/16    Chlamydia (discrete) ^ Negative  12/15/16    RPR      VDRL      Syphillis Antibody      Rubella ^ Non-Immune  12/15/16    HBsAg      Herpes Simplex Virus PCR      Herpes Simplex VIrus Culture      HIV ^ Negative  12/15/16    Hep C RNA Quant PCR  "     Hep C Antibody      Urine Drug Screen      AFP      Group B Strep ^ Negative  17    GBS Susceptibility to Clindamycin      GBS Susceptibility to Eythromycin      Fetal Fibronectin      Genetic Testing, Maternal Blood             Legend: ^: Historical           Past OB History:     Obstetric History       T3      TAB0   SAB0   E0   M0   L5       # Outcome Date GA Lbr Damon/2nd Weight Sex Delivery Anes PTL Lv   5 Term 17 38w2d 10:23 / 00:13 8 lb 3.4 oz (3.726 kg) M Vag-Spont EPI N Y      Name: EDU TAYLOR      Apgar1:  8                Apgar5: 9   4   36w0d  6 lb 5 oz (2.863 kg) F Vag-Spont  N Y      Name: ANABEL      Complications: Precipitous delivery   3   36w0d  7 lb 3 oz (3.26 kg) M Vag-Spont None Y Y      Name: SUNDEEP      Complications:  premature rupture of membranes (PPROM) delivered, current hospitalization,Precipitous delivery   2 Term  39w0d  7 lb 14 oz (3.572 kg) F Vag-Spont   Y      Name: MARTI      Complications: PIH (pregnancy induced hypertension), antepartum   1 Term  38w0d  6 lb 14 oz (3.118 kg) F Vag-Spont  Y Y      Name: ANGLE          Past Medical History: Past Medical History:   Diagnosis Date   • Gestational hypertension     Only with the second pregnancy.   • History of benign schwannoma     Involving lumbar spine low the level of L-2 Dr. Chencho Skelton mentioned in  that it was \"reaching out to us\".   • Hypertension    • Hypertension in pregnancy, preeclampsia     Only with the second pregnancy.   • Hypothyroid     not medicating    • Migraine     Occasional headaches when pregnant, ? Not true migraine.   •  labor in third trimester    • Scoliosis     Pt denies   • Vaginal delivery     x4   Angle      Marti    2014   Sundeep    2016   Cece       Past Surgical History Past Surgical History:   Procedure Laterality Date   • DENTAL PROCEDURE      IMPLANT   • WISDOM TOOTH EXTRACTION      "   Family History: Family History   Problem Relation Age of Onset   • No Known Problems Daughter    • No Known Problems Daughter    • No Known Problems Son    • No Known Problems Daughter    • Coronary artery disease Mother       Social History:  reports that she has never smoked. She has never used smokeless tobacco.   reports that she does not drink alcohol.   reports that she does not use illicit drugs.         General ROS: The following systems were reviewed and negative;            Constitution,No fever or chills.           Respiratory, no cough.          Cardiovascular, no chest pain.          Gastrointestinal, no nausea vomiting or change in bowel movements.          Genitourinary, no dysuria.  and           Integument no rashes or lesions..    Objective       Vital Signs Range for the last 24 hours  Temperature:     Temp Source:     BP:     Pulse:     Respirations:     SPO2:     O2 Amount (l/min):     O2 Devices     Weight:       Physical Examination:   Gen'l Appearance:Well-developed, overweight white female looking very pregnant.  Neck: Thyroid normal.  . No lymphadenopathy.  Lungs: Clear to auscultation.   Heart: RR  without  Murmur / gallop.  Abdomen: Upper abdomen not tender, BS present.      Gravid Uterus, 40 wk size,  not tender,      Not Felicita,   FHT's 110's-120's,  Reactive, with no decels, Category 1.       Pelvic= 70 %, 3-4 cm, -1 station. AROM with clear fluid.  Legs: no edema            DTRs 2+, no Clonus      Laboratory Results: Hemoglobin 10.9 g percent, hematocrit 32%.  WBC 9.94, platelets 202.    Assessment/Plan     Active Problems:    Rubella non-immune status, antepartum        Assessment:  1.  Intrauterine pregnancy at 38w2d weeks gestation with reassuring fetal status.    2.  induction of labor  for Decreased fetal movement  with favorable cervix  3.  Obstetrical history significant for is remarkable for precipitous delivery with her last pregnancy..  4.  GBS status:  Negative.    Plan:  1. fetal and uterine monitoring  continuously and Pitocin induction of labor.  2. Plan of care has been reviewed with patient and  Issac.  3. Risks, benefits of treatment plan have been discussed.  4.  All questions have been answered.    Dru Dawson MD  7/4/2017  2:01 PM

## 2017-07-05 ENCOUNTER — TELEPHONE (OUTPATIENT)
Dept: OBSTETRICS AND GYNECOLOGY | Age: 37
End: 2017-07-05

## 2017-07-06 NOTE — TELEPHONE ENCOUNTER
Dr. Dawson spoke with Clementina ablderas for 4 weeks before starting PT for back  to see how much it improves.  Pain began 2 -3 weeks before delivery

## 2017-07-08 NOTE — PROGRESS NOTES
Patient has noticed a decrease in fetal movement for the last week.  Biophysical profile and ROSIO are normal.  NST is reactive.  Last 2 pregnancies delivered at 36 weeks.  Uterus looks pretty large.  Recommend that we go ahead and induce labor tomorrow.  Patient and Issac both agree.

## 2017-09-06 ENCOUNTER — TELEPHONE (OUTPATIENT)
Dept: OBSTETRICS AND GYNECOLOGY | Age: 37
End: 2017-09-06

## 2017-09-06 NOTE — TELEPHONE ENCOUNTER
email mirena authorization form to [email or web address]    She was to be scheduled for postpartum visit on 09/08  Find appt     Schedule appt once we get approval    No sex for two weeks and check quant hcg the day before insertion

## 2017-09-25 ENCOUNTER — TELEPHONE (OUTPATIENT)
Dept: OBSTETRICS AND GYNECOLOGY | Age: 37
End: 2017-09-25

## 2017-10-18 ENCOUNTER — POSTPARTUM VISIT (OUTPATIENT)
Dept: OBSTETRICS AND GYNECOLOGY | Age: 37
End: 2017-10-18

## 2017-10-18 VITALS
DIASTOLIC BLOOD PRESSURE: 78 MMHG | WEIGHT: 232 LBS | SYSTOLIC BLOOD PRESSURE: 118 MMHG | BODY MASS INDEX: 36.41 KG/M2 | HEIGHT: 67 IN

## 2017-10-18 DIAGNOSIS — Z30.430 ENCOUNTER FOR IUD INSERTION: ICD-10-CM

## 2017-10-18 DIAGNOSIS — Z11.51 SPECIAL SCREENING EXAMINATION FOR HUMAN PAPILLOMAVIRUS (HPV): ICD-10-CM

## 2017-10-18 DIAGNOSIS — Z01.812 PRE-PROCEDURE LAB EXAM: ICD-10-CM

## 2017-10-18 LAB
B-HCG UR QL: NEGATIVE
INTERNAL NEGATIVE CONTROL: NEGATIVE
INTERNAL POSITIVE CONTROL: POSITIVE
Lab: NORMAL

## 2017-10-18 PROCEDURE — 0503F POSTPARTUM CARE VISIT: CPT | Performed by: OBSTETRICS & GYNECOLOGY

## 2017-10-18 PROCEDURE — 58300 INSERT INTRAUTERINE DEVICE: CPT | Performed by: OBSTETRICS & GYNECOLOGY

## 2017-10-18 PROCEDURE — 81025 URINE PREGNANCY TEST: CPT | Performed by: OBSTETRICS & GYNECOLOGY

## 2017-10-18 NOTE — PROGRESS NOTES
"POST PARTUM VISIT                         10/18/2017    Subjective   Clementina Hernandez is a 37 y.o. female.     Chief complaint:  Postpartum Care (Clementina is here for her Post Partum Visit and mirena insertion.  Baby boy 8lbs 3.4oz on 07/01/2017.  His name is Ortega, bottle feeding.  )    History of Present Illness  No problems. Plans Mirena today. Last day of her period.    Review of Systems  Any bleeding problems?  No      Had a first period yet?  Yes, she had first cycle on 10/13/2017.   Any breast problems ? No  Any problems with eating/ diet? No   Any problems emptying her bladder? No  Any problems with bowel movements? No  Any problems with anxiety, depression, or general apathy   towards motherhood or the baby?  No     Birth control plans: Mirena .    The following portions of the patient's history were reviewed / updated as appropriate:   allergies, current medications,  past medical history, past surgical history, past family history, past social history, and problem list.    Objective     /78  Ht 67\" (170.2 cm)  Wt 232 lb (105 kg)  LMP 10/13/2017  Breastfeeding? No  BMI 36.34 kg/m2    PHYSICAL EXAM     Well-developed, well-nourished,obese  female, in no distress, alert and well oriented ×3.       Skin is warm, dry, without rash.          Neck appears normal, trachea in the midline.  Thyroid exam normal.         No cervical lymphadenopathy.       Lungs clear to auscultation.  Chest wall appears normal.       Heart with regular rhythm without murmur or gallop.       Breasts are not engorged.      Right breast nontender, without dominant mass.  No Milk leakage.         No superficial skin changes.  No axillary adenopathy.     Left breast nontender, without dominant mass.  No Milk leakage.        No superficial skin changes.  No axillary adenopathy.         Abdomen is  flat, soft and nontender.No palpable mass.                No inguinal lymphadenopathy bilaterally.        Pelvic exam :  Vulva " normal.         External genitalia normal.  Healed well.  BUS negative.          Introitus normal.  Vaginal mucosa normal.  Healed well.         Cervix normal,Pap with HPV, no cervical motion tenderness.        Uterus R/V, normal size, normal shape, and position.        Adnexa are negative bilaterally.  No tenderness.  No palpable mass.        Rectovaginal exam  deferred.       Lower extremities without edema.        Coordination is grossly normal.       Mood and affect is normal.  Behavior normal.  Thought content normal.         Judgment normal.          IUD insertion     Patient is aware of risks and benefits of a Mirena IUD.  Verbal and written informed consent obtained.     Speculum was placed in the vagina and the cervix was prepped with Betadine.     A single-tooth tenaculum was attached to the anterior lip of the cervix and the uterus was sounded 9 cm in the retroverted position.     Gloves were changed to sterile gloves, IUD was prepped and inserted without difficulty and deployed per package insert protocol.     Strings were cut to 1 inch.  Tenaculum was removed.  Excess Betadine was removed as the speculum was removed.     Patient tolerated the procedure very well.     Instructions were given, the patient will return to the office in 8 weeks for IUD check with ultrasound.    Assessment/Plan   Clementina Goodman was seen today for postpartum care.    Diagnoses and all orders for this visit:    Routine postpartum follow-up    Pre-procedure lab exam  -     POC Pregnancy, Urine    Encounter for IUD insertion  -     levonorgestrel (MIRENA) 20 MCG/24HR IUD; by Intrauterine route 1 (One) Time.      Dru Dawson MD  10/18/2017

## 2017-10-23 LAB
CYTOLOGIST CVX/VAG CYTO: NORMAL
CYTOLOGY CVX/VAG DOC THIN PREP: NORMAL
DX ICD CODE: NORMAL
HIV 1 & 2 AB SER-IMP: NORMAL
HPV I/H RISK 1 DNA CVX QL PROBE+SIG AMP: NEGATIVE
Lab: NORMAL
OTHER STN SPEC: NORMAL
PATH REPORT.FINAL DX SPEC: NORMAL
STAT OF ADQ CVX/VAG CYTO-IMP: NORMAL

## 2017-10-29 NOTE — PROGRESS NOTES
Notify that the Pap smear was negative and the high risk HPV testing was negative.  And the hCG was negative.  I think she knew that.

## 2017-11-06 ENCOUNTER — TELEPHONE (OUTPATIENT)
Dept: OBSTETRICS AND GYNECOLOGY | Age: 37
End: 2017-11-06

## 2017-11-13 ENCOUNTER — TELEPHONE (OUTPATIENT)
Dept: OBSTETRICS AND GYNECOLOGY | Age: 37
End: 2017-11-13

## 2017-11-17 ENCOUNTER — OFFICE VISIT (OUTPATIENT)
Dept: OBSTETRICS AND GYNECOLOGY | Age: 37
End: 2017-11-17

## 2017-11-17 VITALS
SYSTOLIC BLOOD PRESSURE: 122 MMHG | DIASTOLIC BLOOD PRESSURE: 80 MMHG | HEIGHT: 67 IN | BODY MASS INDEX: 34.84 KG/M2 | WEIGHT: 222 LBS

## 2017-11-17 DIAGNOSIS — N92.1 MENOMETRORRHAGIA: Primary | ICD-10-CM

## 2017-11-17 DIAGNOSIS — R10.2 PELVIC PAIN IN FEMALE: ICD-10-CM

## 2017-11-17 PROCEDURE — 99213 OFFICE O/P EST LOW 20 MIN: CPT | Performed by: OBSTETRICS & GYNECOLOGY

## 2017-11-17 PROCEDURE — 58301 REMOVE INTRAUTERINE DEVICE: CPT | Performed by: OBSTETRICS & GYNECOLOGY

## 2017-11-17 RX ORDER — MULTIPLE VITAMINS W/ MINERALS TAB 9MG-400MCG
1 TAB ORAL
COMMUNITY
End: 2019-07-29

## 2017-11-18 NOTE — PROGRESS NOTES
" GYN PROBLEM EXAM                                    2017    Subjective   Clementina Hernandez is a 37 y.o. White Female,      Chief complaint:  Follow-up (Clementina is here to have IUD removed. )    History of Present Illness :Clementina has had problematic cramping and bleeding ever since she had the IUD placed about a month ago.  She wants it removed.  Her  plans to have a vasectomy.  Precautions were given about the possibility of pregnancy pending vasectomy being performed and follow-up test documenting aspermia.    The following portions of the patient's history were reviewed / updated as appropriate:   allergies, current medications,  past medical history, past surgical history, past family history, past social history, and problem list.    Review of Systems    /80  Ht 67\" (170.2 cm)  Wt 222 lb (101 kg)  Breastfeeding? No Comment: MIRENA IUD  BMI 34.77 kg/m2    Objective   OBGyn Exam     PHYSICAL EXAM      Well-developed, well-nourished, obese  female, in no distress, alert and well oriented ×3.        Pelvic exam :  Vulva normal.           External genitalia normal.  BUS negative.             Introitus normal.  Vaginal mucosa normal.            Cervix normal, Mirena IUD removed without difficulty and shown to the patient., no cervical motion tenderness.          Uterus midplane, normal size, normal shape, and position.  Not tender.          Adnexa are negative bilaterally.  No tenderness.  No palpable mass.          Rectovaginal exam deferred.        Lower extremities without edema.         Coordination is grossly normal.        Mood and affect is normal.  Behavior normal.  Thought content normal.            Judgment normal.         Assessment/Plan   Clementina Goodman was seen today for follow-up.    Diagnoses and all orders for this visit:    Menometrorrhagia    Pelvic pain in female    COMMENTS: IUD was removed.  Precautions given about conception while awaiting vasectomy " completion.    Dru Dawson MD  11/18/2017

## 2018-04-23 ENCOUNTER — TELEPHONE (OUTPATIENT)
Dept: OBSTETRICS AND GYNECOLOGY | Age: 38
End: 2018-04-23

## 2018-04-23 DIAGNOSIS — N93.8 DUB (DYSFUNCTIONAL UTERINE BLEEDING): Primary | ICD-10-CM

## 2018-04-23 RX ORDER — MEDROXYPROGESTERONE ACETATE 10 MG/1
TABLET ORAL
Qty: 10 TABLET | Refills: 3 | Status: SHIPPED | OUTPATIENT
Start: 2018-04-23 | End: 2019-07-29

## 2018-04-23 NOTE — TELEPHONE ENCOUNTER
Pt missed 2 periods. Pt states she started a period 3 weeks ago. Pt states it will lighten up and then become heavy again. Pt states this morning she got up and it was extremely heavy with a lot of clots and running down her leg while wearing a pad. Pt states she has taken many pregnancy test and all came back negative.

## 2018-04-24 LAB — HCG INTACT+B SERPL-ACNC: <1 MIU/ML

## 2018-04-24 NOTE — TELEPHONE ENCOUNTER
"April 23, 2018.  9:06 PM.  Clementina is having some dysfunctional uterine bleeding.  She skipped her period for 2 months and then began bleeding \"out of the blue\", without any molimina, about 3 weeks ago.  It is sometimes very light and then it finally seem to be stopping and then got very heavy for a day and then stopped for a little bit and then has been off and on since.  It sounds like an anovulatory cycle.  Her  still has not gotten a vasectomy.  She has done multiple pregnancy tests.       Her last weight, last October, was 220 pounds and she gained up to 237 pounds, but now has been dieting and has lost back down to 223.        She will come in tomorrow for a quantitative hCG, sent as a STAT, and then when that result is and she will start Provera 10 mg, taking it on a regimen of 4-3-2-1, expecting her bleeding may slow down or stop while she is taking the Provera but then she'll have a full blown regular period, probably fairly heavily.  "

## 2018-11-28 ENCOUNTER — OFFICE VISIT (OUTPATIENT)
Dept: OBSTETRICS AND GYNECOLOGY | Age: 38
End: 2018-11-28

## 2018-11-28 VITALS
HEIGHT: 67 IN | BODY MASS INDEX: 30.92 KG/M2 | WEIGHT: 197 LBS | SYSTOLIC BLOOD PRESSURE: 128 MMHG | DIASTOLIC BLOOD PRESSURE: 74 MMHG

## 2018-11-28 DIAGNOSIS — N81.11 PELVIC RELAXATION DUE TO CYSTOCELE, MIDLINE: ICD-10-CM

## 2018-11-28 DIAGNOSIS — N81.4 UTERINE PROLAPSE: ICD-10-CM

## 2018-11-28 DIAGNOSIS — Z01.419 WELL WOMAN EXAM WITH ROUTINE GYNECOLOGICAL EXAM: Primary | ICD-10-CM

## 2018-11-28 DIAGNOSIS — N81.6 PELVIC RELAXATION DUE TO RECTOCELE: ICD-10-CM

## 2018-11-28 DIAGNOSIS — Z01.419 ENCOUNTER FOR GYNECOLOGICAL EXAMINATION: ICD-10-CM

## 2018-11-28 PROBLEM — Z34.82 ENCOUNTER FOR SUPERVISION OF OTHER NORMAL PREGNANCY, SECOND TRIMESTER: Status: RESOLVED | Noted: 2017-04-12 | Resolved: 2018-11-28

## 2018-11-28 PROBLEM — Z28.39 RUBELLA NON-IMMUNE STATUS, ANTEPARTUM: Status: RESOLVED | Noted: 2017-07-02 | Resolved: 2018-11-28

## 2018-11-28 PROBLEM — O09.899 RUBELLA NON-IMMUNE STATUS, ANTEPARTUM: Status: RESOLVED | Noted: 2017-07-02 | Resolved: 2018-11-28

## 2018-11-28 PROBLEM — Z3A.38 38 WEEKS GESTATION OF PREGNANCY: Status: RESOLVED | Noted: 2017-02-21 | Resolved: 2018-11-28

## 2018-11-28 PROBLEM — O36.60X0 LGA (LARGE FOR GESTATIONAL AGE) FETUS AFFECTING MANAGEMENT OF MOTHER: Status: RESOLVED | Noted: 2017-05-17 | Resolved: 2018-11-28

## 2018-11-28 LAB
BILIRUB BLD-MCNC: NEGATIVE MG/DL
CLARITY, POC: CLEAR
COLOR UR: YELLOW
GLUCOSE UR STRIP-MCNC: NEGATIVE MG/DL
KETONES UR QL: NEGATIVE
LEUKOCYTE EST, POC: NEGATIVE
NITRITE UR-MCNC: NEGATIVE MG/ML
PH UR: 7 [PH] (ref 5–8)
PROT UR STRIP-MCNC: NEGATIVE MG/DL
RBC # UR STRIP: NEGATIVE /UL
SP GR UR: 1.02 (ref 1–1.03)
UROBILINOGEN UR QL: NORMAL

## 2018-11-28 PROCEDURE — 81002 URINALYSIS NONAUTO W/O SCOPE: CPT | Performed by: OBSTETRICS & GYNECOLOGY

## 2018-11-28 PROCEDURE — 99395 PREV VISIT EST AGE 18-39: CPT | Performed by: OBSTETRICS & GYNECOLOGY

## 2018-11-28 PROCEDURE — 99213 OFFICE O/P EST LOW 20 MIN: CPT | Performed by: OBSTETRICS & GYNECOLOGY

## 2018-11-28 NOTE — PROGRESS NOTES
"Routine Annual Visit    2018    Patient: Clementina Hernandez          MR#:[id number]    Chief Complaint   Patient presents with   • Gynecologic Exam     Annual:last pap 10/2017,discuss prolapse       38 y.o. female  who presents for annual exam.     HISTORY OF PRESENT ILLNESS:    GYN Complaints:  Having issues with \"prolapse\". Having throbbing and pressure sensation, swollen labia, rahat with cycles. Can frequently fell her cervix at the introitus, can no longer wear a Diva Cup. No pain with coitus. No birth control. Certain she never wants to be preg again. Super heavy periods on Day 1-2. Maxi pads and tampons q 1-2 hr, then lightens up.     Voiding all the time. Or constantly needs to void again. No SHAZIA with cough or sneeze, most of the time. Occ urgency incontinence. Not doing Kegels.    Other Complaints: \"Tons of Hair Loss\". Taking supplements of soy, OPTIVIA, for weight loss. ? Related.    GYN HISTORY:    1.  Menstrual Hx: See above.  Regular menses.                     Current contraception: condoms    2.  History of abnormal Pap smear: yes - , ASCUS, negative HR-HPV.  Subsequent Paps in , , 16, 17 and all been negative.  In  and , Paps also negative for HR-HPV..         Pap smear Hx:   Before , \"no history of abnormal Pap smears\".    , '10, Normal yearly Paps.    , ASCUS, Neg HR-HPV.    , Neg Pap, Neg HR-HPV.    , Neg Paps.    2017, Neg Pap, Neg HR-HPV.    NEW PAST MEDICAL HISTORY:    3.  New Medical Hx:  None.     4.  New Surgical Hx:  None.     5.  New Family Medical Hx:  None.      6.  SCREENINGS:  =Family history of breast cancer: no  =Family history of ovarian cancer: no  =Family history of uterine cancer: no  =Family History of colon cancer: yes - PGF in his 50's.    Perform regular self breast exam: no  Breast self-examination technique  reviewed and the patient encouraged to perform self breast exams monthly.     Mammogram: not " "indicated.  Colonoscopy: not indicated.  DEXA: not indicated.    7.  LIFESTYLE:  =Diet: Working on the diet..   = We discussed healthy lifestyle modifications.      =Exercise:  yes - Twice a week minimum..   =I recommended 30 minutes of aerobic exercise 5 times a week.     =Calcium:  no.  =Vitamin D:  no.   = We discussed calcium intake needs to help prevent osteoporosis:      Recommended 600 mg of calcium daily and 1000 IUs of vitamin D 3 daily.      Review of Systems   Skin:        Hair loss.   All other systems reviewed and are negative.      Objective     /74   Ht 170.2 cm (67\")   Wt 89.4 kg (197 lb)   LMP 11/14/2018 (Approximate)   BMI 30.85 kg/m²     PHYSICAL EXAM    OBGyn Exam    Constitutional: Well-developed, well-nourished, overweight  female, in no distress, alert and well oriented ×3.    Skin is warm, dry, without rash.       Neck: Normal, trachea in the midline.  Thyroid exam normal.          No cervical lymphadenopathy.    Back: Normal.  No CVA tenderness.    Lungs: Clear to auscultation.  Chest wall appears normal.    Heart:: Regular Rhythm without murmur or gallop.    Breasts: I strongly encouraged regular self breast exams, at least once monthly.        Right breast nontender, without dominant mass.  No nipple discharge.            No superficial skin changes.  No axillary adenopathy.        Left breast nontender, without dominant mass.  No nipple discharge.            No superficial skin changes.  No axillary adenopathy.      Abdomen: Flat, soft and nontender.No palpable mass.           No hepatosplenomegaly.  Flanks are negative.          Bowel sounds normal.  No abdominal bruit.          No inguinal lymphadenopathy bilaterally.     Pelvic exam :  Vulva normal.           External genitalia normal.  BUS negative.             Introitus normal.  Vaginal mucosa normal.  Well estrogenized.  Mild to moderate cystocele, mild rectocele, but cervix descends almost to the introitus.  No " SHAZIA demonstrated with cough.           Cervix normal, Pap with HPV.  No cervical motion tenderness.          Uterus retroverted, long, normal size, normal shape, and position. Not tender.          Adnexa are negative bilaterally.  No tenderness.  No palpable mass.          Rectovaginal exam negative .      Musc /Skel: Lower extremities without edema.     Neuro: Coordination is grossly normal.  Gait is normal.    Psych: Mood and affect is normal.  Behavior normal.  Thought content normal.            Judgment normal.       =All questions were answered.      Assessment/Plan   Clementina Goodman was seen today for gynecologic exam.    Diagnoses and all orders for this visit:    Well woman exam with routine gynecological exam  -     IGP, Apt HPV,rfx 16 / 18,45    Encounter for gynecological examination  -     POC Urinalysis Dipstick    Uterine prolapse    Pelvic relaxation due to cystocele, midline    Pelvic relaxation due to rectocele      COMMENTS: We had a detailed discussion about Clementina Goodman's pelvic relaxation.  She has regular very heavy periods and at those times especially she has very uncomfortable symptoms of prolapse.  She is not having stress urinary incontinence although she does notice that she voids frequently and she only rarely has urgency incontinence.  Having had 5 children I think her best chance for a long-term result is a laparoscopic assisted vaginal hysterectomy, with removal of both fallopian tubes (to decrease her risk of ovarian cancer), and then anterior and posterior repair.  Endometrial ablation to resolve her heavy periods will not solve her pelvic prolapse symptoms.  She is certain she never wants to be pregnant again.     I think it would be prudent for her to have urodynamic testing, especially with a pessary in the vagina to evaluate the risk of stress urinary incontinence when the cystocele is flattened out.  Patient agrees and wants to start the process.    Dru Dawson,  MD  11/28/2018

## 2018-12-03 LAB
CYTOLOGIST CVX/VAG CYTO: NORMAL
CYTOLOGY CVX/VAG DOC THIN PREP: NORMAL
DX ICD CODE: NORMAL
HIV 1 & 2 AB SER-IMP: NORMAL
HPV I/H RISK 4 DNA CVX QL PROBE+SIG AMP: NEGATIVE
OTHER STN SPEC: NORMAL
PATH REPORT.FINAL DX SPEC: NORMAL
STAT OF ADQ CVX/VAG CYTO-IMP: NORMAL

## 2018-12-07 ENCOUNTER — TELEPHONE (OUTPATIENT)
Dept: OBSTETRICS AND GYNECOLOGY | Age: 38
End: 2018-12-07

## 2018-12-10 ENCOUNTER — TELEPHONE (OUTPATIENT)
Dept: OBSTETRICS AND GYNECOLOGY | Age: 38
End: 2018-12-10

## 2018-12-10 NOTE — TELEPHONE ENCOUNTER
Pt. Had questions regarding cervix   Details after effects of having uterus removed.  Pt aware you are out until Wednesday afternoon

## 2019-03-08 ENCOUNTER — TELEPHONE (OUTPATIENT)
Dept: OBSTETRICS AND GYNECOLOGY | Age: 39
End: 2019-03-08

## 2019-03-11 ENCOUNTER — TELEPHONE (OUTPATIENT)
Dept: OBSTETRICS AND GYNECOLOGY | Age: 39
End: 2019-03-11

## 2019-03-11 NOTE — TELEPHONE ENCOUNTER
Clementina states that she spoke with you this weekend.  She wanted to let you know that if the bladder testing could be done ASAP (previous problem with insurance) she could have surgery in April.    APPT ASAP with urologist.

## 2019-04-23 ENCOUNTER — OFFICE VISIT (OUTPATIENT)
Dept: OBSTETRICS AND GYNECOLOGY | Facility: CLINIC | Age: 39
End: 2019-04-23

## 2019-04-23 DIAGNOSIS — N81.11 PELVIC RELAXATION DUE TO CYSTOCELE, MIDLINE: ICD-10-CM

## 2019-04-23 DIAGNOSIS — N39.3 SUI (STRESS URINARY INCONTINENCE, FEMALE): Primary | ICD-10-CM

## 2019-04-23 PROCEDURE — 51741 ELECTRO-UROFLOWMETRY FIRST: CPT | Performed by: OBSTETRICS & GYNECOLOGY

## 2019-04-23 PROCEDURE — 51784 ANAL/URINARY MUSCLE STUDY: CPT | Performed by: OBSTETRICS & GYNECOLOGY

## 2019-04-23 PROCEDURE — 51729 CYSTOMETROGRAM W/VP&UP: CPT | Performed by: OBSTETRICS & GYNECOLOGY

## 2019-04-23 PROCEDURE — 51797 INTRAABDOMINAL PRESSURE TEST: CPT | Performed by: OBSTETRICS & GYNECOLOGY

## 2019-04-23 NOTE — PROGRESS NOTES
This is a patient of Dr. Bob who presents for urodynamic testing.  She is delivered 5 children in the past.  She has urgency but denies pain when her bladder is full.  She loses urine when she sneezes coughs jumps and laughs.  She has some overactive bladder symptoms like leaking with running water.    Complex cystometrogram was done.  Patient had a normal voiding trial and a small postvoid residual.  Cystometrogram was performed.  She had a stable detrusor.  Stress urinary incontinence was not demonstrated during this artificial recreation of her daily life.  She had a normal capacity and compliance.    Impression: Stress urinary incontinence by history, stable detrusor.  Full study scanned under the media tab in epic.    Adrian Virgen MD  4/23/2019  4:04 PM

## 2019-04-30 ENCOUNTER — TELEPHONE (OUTPATIENT)
Dept: OBSTETRICS AND GYNECOLOGY | Age: 39
End: 2019-04-30

## 2019-05-10 ENCOUNTER — TELEPHONE (OUTPATIENT)
Dept: OBSTETRICS AND GYNECOLOGY | Age: 39
End: 2019-05-10

## 2019-05-10 NOTE — TELEPHONE ENCOUNTER
May 10, 2019, 5:38 PM  I spoke to Clementina about her surgery.  We will want to pursue an LAVH, removal of both tubes, with anterior and posterior repair, ideally scheduling this on May 27.  I will place orders and will see what we can do.

## 2019-05-13 RX ORDER — SODIUM CHLORIDE 0.9 % (FLUSH) 0.9 %
3-10 SYRINGE (ML) INJECTION AS NEEDED
Status: CANCELLED | OUTPATIENT
Start: 2019-07-31

## 2019-05-13 RX ORDER — SODIUM CHLORIDE 0.9 % (FLUSH) 0.9 %
3 SYRINGE (ML) INJECTION EVERY 12 HOURS SCHEDULED
Status: CANCELLED | OUTPATIENT
Start: 2019-07-31

## 2019-05-18 ENCOUNTER — RESULTS ENCOUNTER (OUTPATIENT)
Dept: OBSTETRICS AND GYNECOLOGY | Age: 39
End: 2019-05-18

## 2019-05-18 DIAGNOSIS — N81.11 PELVIC RELAXATION DUE TO CYSTOCELE, MIDLINE: ICD-10-CM

## 2019-05-18 DIAGNOSIS — N81.4 UTERINE PROLAPSE: ICD-10-CM

## 2019-05-18 DIAGNOSIS — N81.6 PELVIC RELAXATION DUE TO RECTOCELE: ICD-10-CM

## 2019-06-06 ENCOUNTER — TELEPHONE (OUTPATIENT)
Dept: OBSTETRICS AND GYNECOLOGY | Age: 39
End: 2019-06-06

## 2019-06-06 NOTE — TELEPHONE ENCOUNTER
Dr PRIDE pt has to have surg, could not coordinate with hosp sched pts sched while Dr PRIDE is in. Who was the physician in the office that Dr PRIDE would recomm to see and have surg done by them. Please advise

## 2019-06-07 NOTE — TELEPHONE ENCOUNTER
June 6, 2019.  9:01 PM  I called Clementina manning to talk about her surgery.  I think Milo Pena is going to be the best person in the office to see regarding her surgery.  I expect she will need a laparoscopic assisted vaginal hysterectomy, with bilateral salpingectomy, and anterior and posterior repair, possible sacrospinous ligament Colpo fixation.  They will have to determine whether or not he thinks she needs a TVT as well in which case I have recommended that she should see Dr. Faustino Rodriguez or Dr. Matt Rodriguez.

## 2019-06-11 NOTE — TELEPHONE ENCOUNTER
Pt sched w Dr Pena for new pt hysterectomy consult. Former Dr Dawson pt. Ok to sched per Dr Pena.

## 2019-06-25 ENCOUNTER — OFFICE VISIT (OUTPATIENT)
Dept: OBSTETRICS AND GYNECOLOGY | Age: 39
End: 2019-06-25

## 2019-06-25 ENCOUNTER — PREP FOR SURGERY (OUTPATIENT)
Dept: OTHER | Facility: HOSPITAL | Age: 39
End: 2019-06-25

## 2019-06-25 VITALS
DIASTOLIC BLOOD PRESSURE: 78 MMHG | BODY MASS INDEX: 34.37 KG/M2 | HEIGHT: 67 IN | WEIGHT: 219 LBS | SYSTOLIC BLOOD PRESSURE: 110 MMHG

## 2019-06-25 DIAGNOSIS — N81.4 UTERINE PROLAPSE: ICD-10-CM

## 2019-06-25 DIAGNOSIS — N81.4 CYSTOCELE WITH UTERINE PROLAPSE: Primary | ICD-10-CM

## 2019-06-25 DIAGNOSIS — N92.0 MENORRHAGIA WITH REGULAR CYCLE: ICD-10-CM

## 2019-06-25 DIAGNOSIS — D36.10 NEURILEMMOMA: ICD-10-CM

## 2019-06-25 DIAGNOSIS — N81.11 PELVIC RELAXATION DUE TO CYSTOCELE, MIDLINE: Primary | ICD-10-CM

## 2019-06-25 PROCEDURE — 99213 OFFICE O/P EST LOW 20 MIN: CPT | Performed by: OBSTETRICS & GYNECOLOGY

## 2019-06-25 RX ORDER — SODIUM CHLORIDE 0.9 % (FLUSH) 0.9 %
3 SYRINGE (ML) INJECTION EVERY 12 HOURS SCHEDULED
Status: CANCELLED | OUTPATIENT
Start: 2019-07-31

## 2019-06-25 RX ORDER — SODIUM CHLORIDE 0.9 % (FLUSH) 0.9 %
3-10 SYRINGE (ML) INJECTION AS NEEDED
Status: CANCELLED | OUTPATIENT
Start: 2019-07-31

## 2019-06-25 NOTE — PROGRESS NOTES
"Subjective     Chief Complaint   Patient presents with   • Gynecologic Exam     last pap 11/29/18. consult for hysterectomy        History of Present Illness  Clementina Hernandez is a 39 y.o. female is being seen today for evaluation and to discuss previously planned vaginal hysterectomy with anterior and posterior repair    This very pleasant patient has seen Dr. Milo Bob in the past and he is recently retired.  She wanted to have the surgery they had discussed performed by me  She has 5 children has significant uterine prolapse with associated cystocele.  She had urodynamics to did not show any stress urinary incontinence.  It was not done with a pessary.  We discussed those limitations.  She also has a history of a schwannoma that she has not been evaluated for in about 5 years I have recommended that she touch base with a neurologist and given her some names for that.  In addition to her uterine prolapse and cystocele she has menorrhagia with every other cycle her cycles are still regular.  Chief Complaint   Patient presents with   • Gynecologic Exam     last pap 11/29/18. consult for hysterectomy    .        The following portions of the patient's history were reviewed and updated as appropriate: allergies, current medications, past family history, past medical history, past social history, past surgical history and problem list.    PAST MEDICAL HISTORY  Past Medical History:   Diagnosis Date   • Gestational hypertension 2010    Only with the second pregnancy.   • History of benign schwannoma 2011    Involving lumbar spine low the level of L-2 Dr. Chencho Skelton mentioned in 2015 that it was \"reaching out to us\".   • History of Papanicolaou smear of cervix 2017    Pap smear Hx: \" No history of abnormal Pap smears before 2009\".   Pap smear Hx: 2009, '10, Normal yearly Paps.  2011, ASCUS, Neg HR-HPV.  2013, Neg Pap, Neg HR-HPV.  2014, '16, Neg Paps.  2017, Neg Pap, Neg HR-HPV.   • Hypertension    • Hypertension in " pregnancy, preeclampsia 2010    Only with the second pregnancy.   • Hypothyroid     not medicating    • LGA (large for gestational age) fetus affecting management of mother 2017   • Migraine     Occasional headaches when pregnant, ? Not true migraine.   •  labor in third trimester    • Rubella non-immune status, antepartum 2017   • Scoliosis     Pt denies   • Vaginal delivery     x5   Angle   - Marti  -   Kevin  -    Cece -        OB History    Para Term  AB Living   5 5 3 2 0 5   SAB TAB Ectopic Molar Multiple Live Births   0 0 0   0 5      # Outcome Date GA Lbr Damon/2nd Weight Sex Delivery Anes PTL Lv   5 Term 17 38w2d 10:23 / 00:13 3726 g (8 lb 3.4 oz) M Vag-Spont EPI N JOHANN      Birth Comments: Infant Scale 1   4   36w0d  2863 g (6 lb 5 oz) F Vag-Spont  N JOHANN      Complications: Precipitous delivery   3   36w0d  3260 g (7 lb 3 oz) M Vag-Spont None Y JOHANN      Complications:  premature rupture of membranes (PPROM) delivered, current hospitalization,Precipitous delivery   2 Term  39w0d  3572 g (7 lb 14 oz) F Vag-Spont   JOHANN      Complications: PIH (pregnancy induced hypertension), antepartum   1 Term  38w0d  3118 g (6 lb 14 oz) F Vag-Spont  Y JOHANN        Past Surgical History:   Procedure Laterality Date   • DENTAL PROCEDURE      IMPLANT   • WISDOM TOOTH EXTRACTION       Family History   Problem Relation Age of Onset   • No Known Problems Daughter    • No Known Problems Daughter    • No Known Problems Daughter    • No Known Problems Son    • Coronary artery disease Mother      Social History     Tobacco Use   Smoking Status Never Smoker   Smokeless Tobacco Never Used       Current Outpatient Medications:   •  medroxyPROGESTERone (PROVERA) 10 MG tablet, Take 4 tablets today, 3 tablets tomorrow, 2 tablets the next day, and one tablet the last day., Disp: 10 tablet, Rfl: 3  •  Multiple Vitamins-Minerals (MULTIVITAMIN WITH  MINERALS) tablet tablet, Take 1 tablet by mouth., Disp: , Rfl:   Immunization History   Administered Date(s) Administered   • MMR 07/03/2017       Review of Systems       Except as outlined in history of physical illness, patient denies any changes in her GYN, , GI systems. All other systems reviewed are negative.    Objective   Physical Exam   Alert and oriented, respirations unlabored, heart regular rate and rhythm   Pelvic declined      Assessment/Plan   Clementina Goodman was seen today for gynecologic exam.    Diagnoses and all orders for this visit:    Pelvic relaxation due to cystocele, midline    Uterine prolapse    Menorrhagia with regular cycle    Neurilemmoma    I had a lengthy discussion with this pleasant patient, I have drawn some diagrams to better illustrate the surgery.  She would prefer not to have a laparoscopic would want his go with a vaginal hysterectomy with anterior and posterior repair.  Given the fact that the urodynamics did not reveal stress urinary incontinence even given the limitation and not being performed with a pessary, we are not going to proceed with any form of TVT.  I discussed the fact that we would visually look at the ovaries but not remove those unless there is some significant abnormality seen.  Given her age of 39 discussed also the importance of keeping her ovaries behind if they appear normal for hormonal production for the next 13 to 14 years or until she goes through natural menopause.  Dr. Bob had previously reviewed risk of bleeding infection potential injury to other organs complications and failure rates.  Patient asked several appropriate questions and voiced a good understanding of planned surgery.               This encounter/visit was 20 minutes.  The amount of time spent counseling was 20.  Some of the items discussed included above-mentioned diagnosis treatment alternatives and potential complications        EMR Dragon/ Transcription disclaimer:  Much of the  encounter note is an electronic transcription/translation of spoken language to printed text. The electronic translation of spoken language may permit erroneous, or at times, nonessential words or phrases to be inadvertently transcribes; Although i have reviewed the note for such errors, some may still exist.

## 2019-06-26 PROBLEM — N81.4 CYSTOCELE WITH UTERINE PROLAPSE: Status: ACTIVE | Noted: 2019-06-26

## 2019-07-17 ENCOUNTER — PREP FOR SURGERY (OUTPATIENT)
Dept: OTHER | Facility: HOSPITAL | Age: 39
End: 2019-07-17

## 2019-07-18 ENCOUNTER — TELEPHONE (OUTPATIENT)
Dept: OBSTETRICS AND GYNECOLOGY | Age: 39
End: 2019-07-18

## 2019-07-23 ENCOUNTER — OFFICE VISIT (OUTPATIENT)
Dept: OBSTETRICS AND GYNECOLOGY | Age: 39
End: 2019-07-23

## 2019-07-23 VITALS
HEIGHT: 67 IN | SYSTOLIC BLOOD PRESSURE: 130 MMHG | BODY MASS INDEX: 34.21 KG/M2 | WEIGHT: 218 LBS | DIASTOLIC BLOOD PRESSURE: 74 MMHG

## 2019-07-23 DIAGNOSIS — N92.0 MENORRHAGIA WITH REGULAR CYCLE: ICD-10-CM

## 2019-07-23 DIAGNOSIS — N81.6 PELVIC RELAXATION DUE TO RECTOCELE: ICD-10-CM

## 2019-07-23 DIAGNOSIS — N81.4 CYSTOCELE WITH UTERINE PROLAPSE: Primary | ICD-10-CM

## 2019-07-23 PROCEDURE — 99214 OFFICE O/P EST MOD 30 MIN: CPT | Performed by: OBSTETRICS & GYNECOLOGY

## 2019-07-23 NOTE — PROGRESS NOTES
"Subjective     Chief Complaint   Patient presents with   • Gynecologic Exam     problem:uterine prolapse,surgery scheduled for 19       History of Present Illness  Clementina Hernandez is a 39 y.o. female is being seen today for reevaluation of pelvic pressure and suspected prolapse.  Patient states she feels like her condition has worsened since of seen her recently and wanted to be reexamined before surgery next week  Chief Complaint   Patient presents with   • Gynecologic Exam     problem:uterine prolapse,surgery scheduled for 19   .        The following portions of the patient's history were reviewed and updated as appropriate: allergies, current medications, past family history, past medical history, past social history, past surgical history and problem list.    PAST MEDICAL HISTORY  Past Medical History:   Diagnosis Date   • Gestational hypertension     Only with the second pregnancy.   • History of benign schwannoma     Involving lumbar spine low the level of L-2 Dr. Chencho Skelton mentioned in  that it was \"reaching out to us\".   • History of Papanicolaou smear of cervix     Pap smear Hx: \" No history of abnormal Pap smears before \".   Pap smear Hx: , '10, Normal yearly Paps.  , ASCUS, Neg HR-HPV.  , Neg Pap, Neg HR-HPV.  , 18, Neg Paps.  , Neg Pap, Neg HR-HPV.   • Hypertension    • Hypertension in pregnancy, preeclampsia     Only with the second pregnancy.   • Hypothyroid     not medicating    • LGA (large for gestational age) fetus affecting management of mother 2017   • Migraine     Occasional headaches when pregnant, ? Not true migraine.   •  labor in third trimester    • Rubella non-immune status, antepartum 2017   • Scoliosis     Pt denies   • Vaginal delivery     x5  2004 Angle   - Marti  -   Kevin  -  2016  Cece -   2017     OB History    Para Term  AB Living   5 5 3 2 0 5   SAB TAB Ectopic Molar Multiple " Live Births   0 0 0   0 5      # Outcome Date GA Lbr Damon/2nd Weight Sex Delivery Anes PTL Lv   5 Term 17 38w2d 10:23 / 00:13 3726 g (8 lb 3.4 oz) M Vag-Spont EPI N JOHANN      Birth Comments: Infant Scale 1   4   36w0d  2863 g (6 lb 5 oz) F Vag-Spont  N JOHANN      Complications: Precipitous delivery   3   36w0d  3260 g (7 lb 3 oz) M Vag-Spont None Y JOHANN      Complications:  premature rupture of membranes (PPROM) delivered, current hospitalization,Precipitous delivery   2 Term  39w0d  3572 g (7 lb 14 oz) F Vag-Spont   JOHANN      Complications: PIH (pregnancy induced hypertension), antepartum   1 Term  38w0d  3118 g (6 lb 14 oz) F Vag-Spont  Y JOHANN        Past Surgical History:   Procedure Laterality Date   • DENTAL PROCEDURE      IMPLANT   • WISDOM TOOTH EXTRACTION       Family History   Problem Relation Age of Onset   • No Known Problems Daughter    • No Known Problems Daughter    • No Known Problems Daughter    • No Known Problems Son    • Coronary artery disease Mother      Social History     Tobacco Use   Smoking Status Never Smoker   Smokeless Tobacco Never Used       Current Outpatient Medications:   •  medroxyPROGESTERone (PROVERA) 10 MG tablet, Take 4 tablets today, 3 tablets tomorrow, 2 tablets the next day, and one tablet the last day., Disp: 10 tablet, Rfl: 3  •  Multiple Vitamins-Minerals (MULTIVITAMIN WITH MINERALS) tablet tablet, Take 1 tablet by mouth., Disp: , Rfl:   Immunization History   Administered Date(s) Administered   • MMR 2017       Review of Systems       Except as outlined in history of physical illness, patient denies any changes in her GYN, , GI systems. All other systems reviewed are negative.    Objective   Physical Exam   Alert and oriented, respirations unlabored, heart regular rate and rhythm   Pelvic external genitalia normal female introitus reveals cervix present at the introitus itself.  Cystocele appears unchanged when patient bears  down or strains the cervix now is starting to protrude a little bit from the introitus.  No significant worsening of first-degree rectocele is  present.      Assessment/Plan   Clementina Goodman was seen today for gynecologic exam.    Diagnoses and all orders for this visit:    Cystocele with uterine prolapse    Menorrhagia with regular cycle    Pelvic relaxation due to rectocele      Will proceed with planned, TVH anterior and posterior repair             This encounter/visit was 15 minutes.  The amount of time spent counseling was 5.  Some of the items discussed included planned surgical procedure for uterine prolapse with cystocele and rectocele        EMR Dragon/ Transcription disclaimer:  Much of the encounter note is an electronic transcription/translation of spoken language to printed text. The electronic translation of spoken language may permit erroneous, or at times, nonessential words or phrases to be inadvertently transcribes; Although i have reviewed the note for such errors, some may still exist.

## 2019-07-29 ENCOUNTER — APPOINTMENT (OUTPATIENT)
Dept: PREADMISSION TESTING | Facility: HOSPITAL | Age: 39
End: 2019-07-29

## 2019-07-29 VITALS
OXYGEN SATURATION: 99 % | BODY MASS INDEX: 34.59 KG/M2 | HEIGHT: 67 IN | SYSTOLIC BLOOD PRESSURE: 130 MMHG | WEIGHT: 220.4 LBS | TEMPERATURE: 97.4 F | RESPIRATION RATE: 16 BRPM | DIASTOLIC BLOOD PRESSURE: 87 MMHG | HEART RATE: 70 BPM

## 2019-07-29 DIAGNOSIS — N81.4 UTERINE PROLAPSE: ICD-10-CM

## 2019-07-29 DIAGNOSIS — N81.6 PELVIC RELAXATION DUE TO RECTOCELE: ICD-10-CM

## 2019-07-29 DIAGNOSIS — N81.11 PELVIC RELAXATION DUE TO CYSTOCELE, MIDLINE: ICD-10-CM

## 2019-07-29 DIAGNOSIS — N81.4 CYSTOCELE WITH UTERINE PROLAPSE: ICD-10-CM

## 2019-07-29 LAB
ABO GROUP BLD: NORMAL
BASOPHILS # BLD AUTO: 0.08 10*3/MM3 (ref 0–0.2)
BASOPHILS NFR BLD AUTO: 1 % (ref 0–1.5)
BLD GP AB SCN SERPL QL: NEGATIVE
DEPRECATED RDW RBC AUTO: 42.9 FL (ref 37–54)
EOSINOPHIL # BLD AUTO: 0.16 10*3/MM3 (ref 0–0.4)
EOSINOPHIL NFR BLD AUTO: 2.1 % (ref 0.3–6.2)
ERYTHROCYTE [DISTWIDTH] IN BLOOD BY AUTOMATED COUNT: 13 % (ref 12.3–15.4)
HCG SERPL QL: NEGATIVE
HCT VFR BLD AUTO: 42.3 % (ref 34–46.6)
HGB BLD-MCNC: 13.4 G/DL (ref 12–15.9)
IMM GRANULOCYTES # BLD AUTO: 0.03 10*3/MM3 (ref 0–0.05)
IMM GRANULOCYTES NFR BLD AUTO: 0.4 % (ref 0–0.5)
LYMPHOCYTES # BLD AUTO: 2.87 10*3/MM3 (ref 0.7–3.1)
LYMPHOCYTES NFR BLD AUTO: 36.9 % (ref 19.6–45.3)
MCH RBC QN AUTO: 28.6 PG (ref 26.6–33)
MCHC RBC AUTO-ENTMCNC: 31.7 G/DL (ref 31.5–35.7)
MCV RBC AUTO: 90.4 FL (ref 79–97)
MONOCYTES # BLD AUTO: 0.42 10*3/MM3 (ref 0.1–0.9)
MONOCYTES NFR BLD AUTO: 5.4 % (ref 5–12)
NEUTROPHILS # BLD AUTO: 4.22 10*3/MM3 (ref 1.7–7)
NEUTROPHILS NFR BLD AUTO: 54.2 % (ref 42.7–76)
NRBC BLD AUTO-RTO: 0 /100 WBC (ref 0–0.2)
PLATELET # BLD AUTO: 246 10*3/MM3 (ref 140–450)
PMV BLD AUTO: 11.3 FL (ref 6–12)
RBC # BLD AUTO: 4.68 10*6/MM3 (ref 3.77–5.28)
RH BLD: POSITIVE
T&S EXPIRATION DATE: NORMAL
WBC NRBC COR # BLD: 7.78 10*3/MM3 (ref 3.4–10.8)

## 2019-07-29 PROCEDURE — 85025 COMPLETE CBC W/AUTO DIFF WBC: CPT | Performed by: OBSTETRICS & GYNECOLOGY

## 2019-07-29 PROCEDURE — 86923 COMPATIBILITY TEST ELECTRIC: CPT

## 2019-07-29 PROCEDURE — 86850 RBC ANTIBODY SCREEN: CPT | Performed by: OBSTETRICS & GYNECOLOGY

## 2019-07-29 PROCEDURE — 36415 COLL VENOUS BLD VENIPUNCTURE: CPT

## 2019-07-29 PROCEDURE — 86900 BLOOD TYPING SEROLOGIC ABO: CPT | Performed by: OBSTETRICS & GYNECOLOGY

## 2019-07-29 PROCEDURE — 84703 CHORIONIC GONADOTROPIN ASSAY: CPT | Performed by: OBSTETRICS & GYNECOLOGY

## 2019-07-29 PROCEDURE — 86901 BLOOD TYPING SEROLOGIC RH(D): CPT | Performed by: OBSTETRICS & GYNECOLOGY

## 2019-07-29 NOTE — DISCHARGE INSTRUCTIONS
ARRIVAL TIME 11:30        General Instructions:  • Do not eat solid food after midnight the night before surgery.  • You may drink clear liquids day of surgery but must stop at least one hour before your hospital arrival time.  • It is beneficial for you to have a clear drink that contains carbohydrates the day of surgery.  We suggest a 12 to 20 ounce bottle of Gatorade or Powerade for non-diabetic patients or a 12 to 20 ounce bottle of G2 or Powerade Zero for diabetic patients. (Pediatric patients, are not advised to drink a 12 to 20 ounce carbohydrate drink)    Clear liquids are liquids you can see through.  Nothing red in color.     Plain water                               Sports drinks  Sodas                                   Gelatin (Jell-O)  Fruit juices without pulp such as white grape juice and apple juice  Popsicles that contain no fruit or yogurt  Tea or coffee (no cream or milk added)  Gatorade / Powerade  G2 / Powerade Zero    • Infants may have breast milk up to four hours before surgery.  • Infants drinking formula may drink formula up to six hours before surgery.   • Patients who avoid smoking, chewing tobacco and alcohol for 4 weeks prior to surgery have a reduced risk of post-operative complications.  Quit smoking as many days before surgery as you can.  • Do not smoke, use chewing tobacco or drink alcohol the day of surgery.   • If applicable bring your C-PAP/ BI-PAP machine.  • Bring any papers given to you in the doctor’s office.  • Wear clean comfortable clothes and socks.  • Do not wear contact lenses, false eyelashes or make-up.  Bring a case for your glasses.   • Bring crutches or walker if applicable.  • Remove all piercings.  Leave jewelry and any other valuables at home.  • Hair extensions with metal clips must be removed prior to surgery.  • The Pre-Admission Testing nurse will instruct you to bring medications if unable to obtain an accurate list in Pre-Admission Testing.        If you  were given a blood bank ID arm band remember to bring it with you the day of surgery.    Preventing a Surgical Site Infection:  • For 2 to 3 days before surgery, avoid shaving with a razor because the razor can irritate skin and make it easier to develop an infection.    • Any areas of open skin can increase the risk of a post-operative wound infection by allowing bacteria to enter and travel throughout the body.  Notify your surgeon if you have any skin wounds / rashes even if it is not near the expected surgical site.  The area will need assessed to determine if surgery should be delayed until it is healed.  • The night prior to surgery sleep in a clean bed with clean clothing.  Do not allow pets to sleep with you.  • Shower on the morning of surgery using a fresh bar of anti-bacterial soap (such as Dial) and clean washcloth.  Dry with a clean towel and dress in clean clothing.  • Ask your surgeon if you will be receiving antibiotics prior to surgery.  • Make sure you, your family, and all healthcare providers clean their hands with soap and water or an alcohol based hand  before caring for you or your wound.    Day of surgery:  Upon arrival, a Pre-op nurse and Anesthesiologist will review your health history, obtain vital signs, and answer questions you may have.  The only belongings needed at this time will be a list of your home medications and if applicable your C-PAP/BI-PAP machine.  If you are staying overnight your family can leave the rest of your belongings in the car and bring them to your room later.  A Pre-op nurse will start an IV and you may receive medication in preparation for surgery, including something to help you relax.  Your family will be able to see you in the Pre-op area.  While you are in surgery your family should notify the waiting room  if they leave the waiting room area and provide a contact phone number.    Please be aware that surgery does come with discomfort.   We want to make every effort to control your discomfort so please discuss any uncontrolled symptoms with your nurse.   Your doctor will most likely have prescribed pain medications.      If you are going home after surgery you will receive individualized written care instructions before being discharged.  A responsible adult must drive you to and from the hospital on the day of your surgery and stay with you for 24 hours.    If you are staying overnight following surgery, you will be transported to your hospital room following the recovery period.  [facility] has all private rooms.    You have received a list of surgical assistants for your reference.  If you have any questions please call Pre-Admission Testing at [phone].  Deductibles and co-payments are collected on the day of service. Please be prepared to pay the required co-pay, deductible or deposit on the day of service as defined by your plan.

## 2019-07-31 ENCOUNTER — HOSPITAL ENCOUNTER (INPATIENT)
Facility: HOSPITAL | Age: 39
LOS: 5 days | Discharge: HOME | End: 2019-08-05
Attending: OBSTETRICS & GYNECOLOGY | Admitting: OBSTETRICS & GYNECOLOGY

## 2019-07-31 ENCOUNTER — ANESTHESIA (OUTPATIENT)
Dept: PERIOP | Facility: HOSPITAL | Age: 39
End: 2019-07-31

## 2019-07-31 ENCOUNTER — APPOINTMENT (OUTPATIENT)
Dept: GENERAL RADIOLOGY | Facility: HOSPITAL | Age: 39
End: 2019-07-31

## 2019-07-31 ENCOUNTER — ANESTHESIA EVENT (OUTPATIENT)
Dept: PERIOP | Facility: HOSPITAL | Age: 39
End: 2019-07-31

## 2019-07-31 DIAGNOSIS — D62 ANEMIA DUE TO ACUTE BLOOD LOSS: Primary | ICD-10-CM

## 2019-07-31 DIAGNOSIS — N81.4 CYSTOCELE WITH UTERINE PROLAPSE: ICD-10-CM

## 2019-07-31 PROBLEM — K46.9 ENTEROCELE: Status: ACTIVE | Noted: 2019-07-31

## 2019-07-31 LAB
DEPRECATED RDW RBC AUTO: 43.4 FL (ref 37–54)
ERYTHROCYTE [DISTWIDTH] IN BLOOD BY AUTOMATED COUNT: 12.9 % (ref 12.3–15.4)
GLUCOSE BLDC GLUCOMTR-MCNC: 236 MG/DL (ref 70–130)
HCT VFR BLD AUTO: 34.9 % (ref 34–46.6)
HGB BLD-MCNC: 11.1 G/DL (ref 12–15.9)
MCH RBC QN AUTO: 28.8 PG (ref 26.6–33)
MCHC RBC AUTO-ENTMCNC: 31.8 G/DL (ref 31.5–35.7)
MCV RBC AUTO: 90.6 FL (ref 79–97)
PLATELET # BLD AUTO: 244 10*3/MM3 (ref 140–450)
PMV BLD AUTO: 11.3 FL (ref 6–12)
RBC # BLD AUTO: 3.85 10*6/MM3 (ref 3.77–5.28)
WBC NRBC COR # BLD: 14.08 10*3/MM3 (ref 3.4–10.8)

## 2019-07-31 PROCEDURE — 25010000002 CEFOXITIN: Performed by: OBSTETRICS & GYNECOLOGY

## 2019-07-31 PROCEDURE — 25010000002 FENTANYL CITRATE (PF) 100 MCG/2ML SOLUTION: Performed by: NURSE ANESTHETIST, CERTIFIED REGISTERED

## 2019-07-31 PROCEDURE — 88305 TISSUE EXAM BY PATHOLOGIST: CPT | Performed by: OBSTETRICS & GYNECOLOGY

## 2019-07-31 PROCEDURE — 58262 VAG HYST INCLUDING T/O: CPT | Performed by: OBSTETRICS & GYNECOLOGY

## 2019-07-31 PROCEDURE — 82962 GLUCOSE BLOOD TEST: CPT

## 2019-07-31 PROCEDURE — 0UT97ZZ RESECTION OF UTERUS, VIA NATURAL OR ARTIFICIAL OPENING: ICD-10-PCS | Performed by: OBSTETRICS & GYNECOLOGY

## 2019-07-31 PROCEDURE — 85027 COMPLETE CBC AUTOMATED: CPT | Performed by: OBSTETRICS & GYNECOLOGY

## 2019-07-31 PROCEDURE — 25010000002 FENTANYL CITRATE (PF) 100 MCG/2ML SOLUTION

## 2019-07-31 PROCEDURE — G0378 HOSPITAL OBSERVATION PER HR: HCPCS

## 2019-07-31 PROCEDURE — 93010 ELECTROCARDIOGRAM REPORT: CPT | Performed by: INTERNAL MEDICINE

## 2019-07-31 PROCEDURE — 71045 X-RAY EXAM CHEST 1 VIEW: CPT

## 2019-07-31 PROCEDURE — 25010000002 NEOSTIGMINE PER 0.5 MG: Performed by: ANESTHESIOLOGY

## 2019-07-31 PROCEDURE — 0UQF7ZZ REPAIR CUL-DE-SAC, VIA NATURAL OR ARTIFICIAL OPENING: ICD-10-PCS | Performed by: OBSTETRICS & GYNECOLOGY

## 2019-07-31 PROCEDURE — 25010000002 DEXAMETHASONE PER 1 MG: Performed by: NURSE ANESTHETIST, CERTIFIED REGISTERED

## 2019-07-31 PROCEDURE — 0JQC3ZZ REPAIR PELVIC REGION SUBCUTANEOUS TISSUE AND FASCIA, PERCUTANEOUS APPROACH: ICD-10-PCS | Performed by: OBSTETRICS & GYNECOLOGY

## 2019-07-31 PROCEDURE — 25010000002 MIDAZOLAM PER 1 MG: Performed by: ANESTHESIOLOGY

## 2019-07-31 PROCEDURE — 57283 COLPOPEXY INTRAPERITONEAL: CPT | Performed by: OBSTETRICS & GYNECOLOGY

## 2019-07-31 PROCEDURE — 25010000002 HYDROMORPHONE PER 4 MG: Performed by: NURSE ANESTHETIST, CERTIFIED REGISTERED

## 2019-07-31 PROCEDURE — 93005 ELECTROCARDIOGRAM TRACING: CPT | Performed by: OBSTETRICS & GYNECOLOGY

## 2019-07-31 PROCEDURE — 25010000002 KETOROLAC TROMETHAMINE PER 15 MG: Performed by: OBSTETRICS & GYNECOLOGY

## 2019-07-31 PROCEDURE — 25010000002 PROPOFOL 10 MG/ML EMULSION: Performed by: NURSE ANESTHETIST, CERTIFIED REGISTERED

## 2019-07-31 PROCEDURE — 0UT77ZZ RESECTION OF BILATERAL FALLOPIAN TUBES, VIA NATURAL OR ARTIFICIAL OPENING: ICD-10-PCS | Performed by: OBSTETRICS & GYNECOLOGY

## 2019-07-31 PROCEDURE — S0260 H&P FOR SURGERY: HCPCS | Performed by: OBSTETRICS & GYNECOLOGY

## 2019-07-31 PROCEDURE — 25010000002 ONDANSETRON PER 1 MG: Performed by: NURSE ANESTHETIST, CERTIFIED REGISTERED

## 2019-07-31 PROCEDURE — 25010000002 ONDANSETRON PER 1 MG: Performed by: ANESTHESIOLOGY

## 2019-07-31 RX ORDER — ACETAMINOPHEN AND CODEINE PHOSPHATE 300; 30 MG/1; MG/1
1 TABLET ORAL EVERY 4 HOURS PRN
Status: DISCONTINUED | OUTPATIENT
Start: 2019-07-31 | End: 2019-08-05 | Stop reason: HOSPADM

## 2019-07-31 RX ORDER — SODIUM CHLORIDE 0.9 % (FLUSH) 0.9 %
3 SYRINGE (ML) INJECTION EVERY 12 HOURS SCHEDULED
Status: DISCONTINUED | OUTPATIENT
Start: 2019-07-31 | End: 2019-07-31 | Stop reason: HOSPADM

## 2019-07-31 RX ORDER — DIPHENHYDRAMINE HCL 25 MG
25 CAPSULE ORAL
Status: DISCONTINUED | OUTPATIENT
Start: 2019-07-31 | End: 2019-07-31 | Stop reason: HOSPADM

## 2019-07-31 RX ORDER — ONDANSETRON 2 MG/ML
4 INJECTION INTRAMUSCULAR; INTRAVENOUS ONCE AS NEEDED
Status: COMPLETED | OUTPATIENT
Start: 2019-07-31 | End: 2019-07-31

## 2019-07-31 RX ORDER — LIDOCAINE HYDROCHLORIDE AND EPINEPHRINE BITARTRATE 20; .01 MG/ML; MG/ML
INJECTION, SOLUTION SUBCUTANEOUS AS NEEDED
Status: DISCONTINUED | OUTPATIENT
Start: 2019-07-31 | End: 2019-07-31 | Stop reason: HOSPADM

## 2019-07-31 RX ORDER — LABETALOL HYDROCHLORIDE 5 MG/ML
5 INJECTION, SOLUTION INTRAVENOUS
Status: DISCONTINUED | OUTPATIENT
Start: 2019-07-31 | End: 2019-07-31 | Stop reason: HOSPADM

## 2019-07-31 RX ORDER — PROMETHAZINE HYDROCHLORIDE 25 MG/1
25 TABLET ORAL ONCE AS NEEDED
Status: DISCONTINUED | OUTPATIENT
Start: 2019-07-31 | End: 2019-07-31 | Stop reason: HOSPADM

## 2019-07-31 RX ORDER — CEFAZOLIN SODIUM IN 0.9 % NACL 3 G/100 ML
3 INTRAVENOUS SOLUTION, PIGGYBACK (ML) INTRAVENOUS EVERY 8 HOURS
Status: COMPLETED | OUTPATIENT
Start: 2019-08-01 | End: 2019-08-01

## 2019-07-31 RX ORDER — IBUPROFEN 600 MG/1
600 TABLET ORAL EVERY 6 HOURS PRN
Status: DISCONTINUED | OUTPATIENT
Start: 2019-07-31 | End: 2019-08-05 | Stop reason: HOSPADM

## 2019-07-31 RX ORDER — MORPHINE SULFATE 2 MG/ML
1 INJECTION, SOLUTION INTRAMUSCULAR; INTRAVENOUS EVERY 4 HOURS PRN
Status: DISCONTINUED | OUTPATIENT
Start: 2019-07-31 | End: 2019-08-05 | Stop reason: HOSPADM

## 2019-07-31 RX ORDER — SODIUM CHLORIDE 0.9 % (FLUSH) 0.9 %
3-10 SYRINGE (ML) INJECTION AS NEEDED
Status: DISCONTINUED | OUTPATIENT
Start: 2019-07-31 | End: 2019-07-31 | Stop reason: HOSPADM

## 2019-07-31 RX ORDER — SODIUM CHLORIDE, SODIUM LACTATE, POTASSIUM CHLORIDE, CALCIUM CHLORIDE 600; 310; 30; 20 MG/100ML; MG/100ML; MG/100ML; MG/100ML
250 INJECTION, SOLUTION INTRAVENOUS CONTINUOUS
Status: DISCONTINUED | OUTPATIENT
Start: 2019-07-31 | End: 2019-07-31

## 2019-07-31 RX ORDER — KETOROLAC TROMETHAMINE 30 MG/ML
30 INJECTION, SOLUTION INTRAMUSCULAR; INTRAVENOUS ONCE
Status: COMPLETED | OUTPATIENT
Start: 2019-07-31 | End: 2019-07-31

## 2019-07-31 RX ORDER — ACETAMINOPHEN 500 MG
500 TABLET ORAL ONCE
Status: COMPLETED | OUTPATIENT
Start: 2019-07-31 | End: 2019-07-31

## 2019-07-31 RX ORDER — NALOXONE HCL 0.4 MG/ML
0.2 VIAL (ML) INJECTION AS NEEDED
Status: DISCONTINUED | OUTPATIENT
Start: 2019-07-31 | End: 2019-07-31 | Stop reason: HOSPADM

## 2019-07-31 RX ORDER — FENTANYL CITRATE 50 UG/ML
INJECTION, SOLUTION INTRAMUSCULAR; INTRAVENOUS
Status: COMPLETED
Start: 2019-07-31 | End: 2019-07-31

## 2019-07-31 RX ORDER — PROPOFOL 10 MG/ML
VIAL (ML) INTRAVENOUS AS NEEDED
Status: DISCONTINUED | OUTPATIENT
Start: 2019-07-31 | End: 2019-07-31 | Stop reason: SURG

## 2019-07-31 RX ORDER — ACETAMINOPHEN 650 MG/1
650 SUPPOSITORY RECTAL ONCE AS NEEDED
Status: DISCONTINUED | OUTPATIENT
Start: 2019-07-31 | End: 2019-07-31 | Stop reason: HOSPADM

## 2019-07-31 RX ORDER — ONDANSETRON 4 MG/1
4 TABLET, FILM COATED ORAL EVERY 6 HOURS PRN
Status: DISCONTINUED | OUTPATIENT
Start: 2019-07-31 | End: 2019-08-05 | Stop reason: HOSPADM

## 2019-07-31 RX ORDER — OXYCODONE AND ACETAMINOPHEN 7.5; 325 MG/1; MG/1
1 TABLET ORAL ONCE AS NEEDED
Status: DISCONTINUED | OUTPATIENT
Start: 2019-07-31 | End: 2019-07-31 | Stop reason: HOSPADM

## 2019-07-31 RX ORDER — GABAPENTIN 300 MG/1
600 CAPSULE ORAL ONCE
Status: COMPLETED | OUTPATIENT
Start: 2019-07-31 | End: 2019-07-31

## 2019-07-31 RX ORDER — SIMETHICONE 80 MG
80 TABLET,CHEWABLE ORAL 4 TIMES DAILY PRN
Status: DISCONTINUED | OUTPATIENT
Start: 2019-07-31 | End: 2019-08-01

## 2019-07-31 RX ORDER — ONDANSETRON 2 MG/ML
INJECTION INTRAMUSCULAR; INTRAVENOUS AS NEEDED
Status: DISCONTINUED | OUTPATIENT
Start: 2019-07-31 | End: 2019-07-31 | Stop reason: SURG

## 2019-07-31 RX ORDER — DOCUSATE SODIUM 100 MG/1
100 CAPSULE, LIQUID FILLED ORAL 2 TIMES DAILY PRN
Status: DISCONTINUED | OUTPATIENT
Start: 2019-07-31 | End: 2019-08-05 | Stop reason: HOSPADM

## 2019-07-31 RX ORDER — DEXAMETHASONE SODIUM PHOSPHATE 10 MG/ML
INJECTION INTRAMUSCULAR; INTRAVENOUS AS NEEDED
Status: DISCONTINUED | OUTPATIENT
Start: 2019-07-31 | End: 2019-07-31 | Stop reason: SURG

## 2019-07-31 RX ORDER — HYDROMORPHONE HYDROCHLORIDE 1 MG/ML
0.5 INJECTION, SOLUTION INTRAMUSCULAR; INTRAVENOUS; SUBCUTANEOUS
Status: DISCONTINUED | OUTPATIENT
Start: 2019-07-31 | End: 2019-07-31 | Stop reason: HOSPADM

## 2019-07-31 RX ORDER — PROMETHAZINE HYDROCHLORIDE 25 MG/1
25 SUPPOSITORY RECTAL ONCE AS NEEDED
Status: DISCONTINUED | OUTPATIENT
Start: 2019-07-31 | End: 2019-07-31 | Stop reason: HOSPADM

## 2019-07-31 RX ORDER — IPRATROPIUM BROMIDE AND ALBUTEROL SULFATE 2.5; .5 MG/3ML; MG/3ML
3 SOLUTION RESPIRATORY (INHALATION) ONCE AS NEEDED
Status: DISCONTINUED | OUTPATIENT
Start: 2019-07-31 | End: 2019-07-31 | Stop reason: HOSPADM

## 2019-07-31 RX ORDER — EPHEDRINE SULFATE 50 MG/ML
5 INJECTION, SOLUTION INTRAVENOUS ONCE AS NEEDED
Status: DISCONTINUED | OUTPATIENT
Start: 2019-07-31 | End: 2019-07-31 | Stop reason: HOSPADM

## 2019-07-31 RX ORDER — SODIUM CHLORIDE, SODIUM LACTATE, POTASSIUM CHLORIDE, CALCIUM CHLORIDE 600; 310; 30; 20 MG/100ML; MG/100ML; MG/100ML; MG/100ML
9 INJECTION, SOLUTION INTRAVENOUS CONTINUOUS
Status: DISCONTINUED | OUTPATIENT
Start: 2019-07-31 | End: 2019-07-31

## 2019-07-31 RX ORDER — ONDANSETRON 2 MG/ML
4 INJECTION INTRAMUSCULAR; INTRAVENOUS EVERY 6 HOURS PRN
Status: DISCONTINUED | OUTPATIENT
Start: 2019-07-31 | End: 2019-08-05 | Stop reason: HOSPADM

## 2019-07-31 RX ORDER — ACETAMINOPHEN 325 MG/1
650 TABLET ORAL ONCE AS NEEDED
Status: DISCONTINUED | OUTPATIENT
Start: 2019-07-31 | End: 2019-07-31 | Stop reason: HOSPADM

## 2019-07-31 RX ORDER — FAMOTIDINE 10 MG/ML
20 INJECTION, SOLUTION INTRAVENOUS ONCE
Status: COMPLETED | OUTPATIENT
Start: 2019-07-31 | End: 2019-07-31

## 2019-07-31 RX ORDER — NALOXONE HCL 0.4 MG/ML
0.1 VIAL (ML) INJECTION
Status: DISCONTINUED | OUTPATIENT
Start: 2019-07-31 | End: 2019-08-05 | Stop reason: HOSPADM

## 2019-07-31 RX ORDER — MAGNESIUM HYDROXIDE 1200 MG/15ML
LIQUID ORAL AS NEEDED
Status: DISCONTINUED | OUTPATIENT
Start: 2019-07-31 | End: 2019-07-31 | Stop reason: HOSPADM

## 2019-07-31 RX ORDER — FENTANYL CITRATE 50 UG/ML
50 INJECTION, SOLUTION INTRAMUSCULAR; INTRAVENOUS
Status: DISCONTINUED | OUTPATIENT
Start: 2019-07-31 | End: 2019-07-31 | Stop reason: HOSPADM

## 2019-07-31 RX ORDER — MIDAZOLAM HYDROCHLORIDE 1 MG/ML
2 INJECTION INTRAMUSCULAR; INTRAVENOUS
Status: DISCONTINUED | OUTPATIENT
Start: 2019-07-31 | End: 2019-07-31 | Stop reason: HOSPADM

## 2019-07-31 RX ORDER — PROMETHAZINE HYDROCHLORIDE 25 MG/ML
12.5 INJECTION, SOLUTION INTRAMUSCULAR; INTRAVENOUS ONCE AS NEEDED
Status: DISCONTINUED | OUTPATIENT
Start: 2019-07-31 | End: 2019-07-31 | Stop reason: HOSPADM

## 2019-07-31 RX ORDER — DIPHENHYDRAMINE HYDROCHLORIDE 50 MG/ML
12.5 INJECTION INTRAMUSCULAR; INTRAVENOUS
Status: DISCONTINUED | OUTPATIENT
Start: 2019-07-31 | End: 2019-07-31 | Stop reason: HOSPADM

## 2019-07-31 RX ORDER — SODIUM CHLORIDE, SODIUM LACTATE, POTASSIUM CHLORIDE, CALCIUM CHLORIDE 600; 310; 30; 20 MG/100ML; MG/100ML; MG/100ML; MG/100ML
125 INJECTION, SOLUTION INTRAVENOUS CONTINUOUS
Status: DISCONTINUED | OUTPATIENT
Start: 2019-08-01 | End: 2019-08-03

## 2019-07-31 RX ORDER — GLYCOPYRROLATE 0.2 MG/ML
INJECTION INTRAMUSCULAR; INTRAVENOUS AS NEEDED
Status: DISCONTINUED | OUTPATIENT
Start: 2019-07-31 | End: 2019-07-31 | Stop reason: SURG

## 2019-07-31 RX ORDER — DEXTROSE AND SODIUM CHLORIDE 5; .45 G/100ML; G/100ML
100 INJECTION, SOLUTION INTRAVENOUS CONTINUOUS
Status: DISCONTINUED | OUTPATIENT
Start: 2019-07-31 | End: 2019-07-31

## 2019-07-31 RX ORDER — PROMETHAZINE HYDROCHLORIDE 25 MG/ML
6.25 INJECTION, SOLUTION INTRAMUSCULAR; INTRAVENOUS
Status: DISCONTINUED | OUTPATIENT
Start: 2019-07-31 | End: 2019-07-31 | Stop reason: HOSPADM

## 2019-07-31 RX ORDER — EPHEDRINE SULFATE 50 MG/ML
INJECTION, SOLUTION INTRAVENOUS AS NEEDED
Status: DISCONTINUED | OUTPATIENT
Start: 2019-07-31 | End: 2019-07-31 | Stop reason: SURG

## 2019-07-31 RX ORDER — SODIUM CHLORIDE, SODIUM LACTATE, POTASSIUM CHLORIDE, CALCIUM CHLORIDE 600; 310; 30; 20 MG/100ML; MG/100ML; MG/100ML; MG/100ML
175 INJECTION, SOLUTION INTRAVENOUS CONTINUOUS
Status: DISCONTINUED | OUTPATIENT
Start: 2019-08-01 | End: 2019-07-31

## 2019-07-31 RX ORDER — OXYCODONE HYDROCHLORIDE AND ACETAMINOPHEN 5; 325 MG/1; MG/1
1 TABLET ORAL EVERY 4 HOURS PRN
Status: DISCONTINUED | OUTPATIENT
Start: 2019-07-31 | End: 2019-08-05 | Stop reason: HOSPADM

## 2019-07-31 RX ORDER — MIDAZOLAM HYDROCHLORIDE 1 MG/ML
1 INJECTION INTRAMUSCULAR; INTRAVENOUS
Status: DISCONTINUED | OUTPATIENT
Start: 2019-07-31 | End: 2019-07-31 | Stop reason: HOSPADM

## 2019-07-31 RX ORDER — FLUMAZENIL 0.1 MG/ML
0.2 INJECTION INTRAVENOUS AS NEEDED
Status: DISCONTINUED | OUTPATIENT
Start: 2019-07-31 | End: 2019-07-31 | Stop reason: HOSPADM

## 2019-07-31 RX ORDER — HYDRALAZINE HYDROCHLORIDE 20 MG/ML
5 INJECTION INTRAMUSCULAR; INTRAVENOUS
Status: DISCONTINUED | OUTPATIENT
Start: 2019-07-31 | End: 2019-07-31 | Stop reason: HOSPADM

## 2019-07-31 RX ORDER — ACETAMINOPHEN AND CODEINE PHOSPHATE 300; 30 MG/1; MG/1
2 TABLET ORAL EVERY 4 HOURS PRN
Status: DISCONTINUED | OUTPATIENT
Start: 2019-07-31 | End: 2019-08-05 | Stop reason: HOSPADM

## 2019-07-31 RX ORDER — LIDOCAINE HYDROCHLORIDE 20 MG/ML
INJECTION, SOLUTION INFILTRATION; PERINEURAL AS NEEDED
Status: DISCONTINUED | OUTPATIENT
Start: 2019-07-31 | End: 2019-07-31 | Stop reason: SURG

## 2019-07-31 RX ORDER — HYDROMORPHONE HYDROCHLORIDE 1 MG/ML
0.5 INJECTION, SOLUTION INTRAMUSCULAR; INTRAVENOUS; SUBCUTANEOUS
Status: DISCONTINUED | OUTPATIENT
Start: 2019-07-31 | End: 2019-08-05 | Stop reason: HOSPADM

## 2019-07-31 RX ORDER — NALOXONE HCL 0.4 MG/ML
0.4 VIAL (ML) INJECTION
Status: DISCONTINUED | OUTPATIENT
Start: 2019-07-31 | End: 2019-08-05 | Stop reason: HOSPADM

## 2019-07-31 RX ORDER — FENTANYL CITRATE 50 UG/ML
INJECTION, SOLUTION INTRAMUSCULAR; INTRAVENOUS AS NEEDED
Status: DISCONTINUED | OUTPATIENT
Start: 2019-07-31 | End: 2019-07-31 | Stop reason: SURG

## 2019-07-31 RX ORDER — ROCURONIUM BROMIDE 10 MG/ML
INJECTION, SOLUTION INTRAVENOUS AS NEEDED
Status: DISCONTINUED | OUTPATIENT
Start: 2019-07-31 | End: 2019-07-31 | Stop reason: SURG

## 2019-07-31 RX ORDER — BISACODYL 10 MG
10 SUPPOSITORY, RECTAL RECTAL DAILY PRN
Status: DISCONTINUED | OUTPATIENT
Start: 2019-07-31 | End: 2019-08-05 | Stop reason: HOSPADM

## 2019-07-31 RX ORDER — HYDROCODONE BITARTRATE AND ACETAMINOPHEN 7.5; 325 MG/1; MG/1
1 TABLET ORAL ONCE AS NEEDED
Status: DISCONTINUED | OUTPATIENT
Start: 2019-07-31 | End: 2019-07-31 | Stop reason: HOSPADM

## 2019-07-31 RX ADMIN — ACETAMINOPHEN 500 MG: 500 TABLET, FILM COATED ORAL at 13:04

## 2019-07-31 RX ADMIN — OXYCODONE HYDROCHLORIDE AND ACETAMINOPHEN 1 TABLET: 5; 325 TABLET ORAL at 17:51

## 2019-07-31 RX ADMIN — SODIUM CHLORIDE 3 G: 9 INJECTION, SOLUTION INTRAVENOUS at 23:49

## 2019-07-31 RX ADMIN — EPHEDRINE SULFATE 5 MG: 50 INJECTION INTRAMUSCULAR; INTRAVENOUS; SUBCUTANEOUS at 14:42

## 2019-07-31 RX ADMIN — LIDOCAINE HYDROCHLORIDE 100 MG: 20 INJECTION, SOLUTION INFILTRATION; PERINEURAL at 13:58

## 2019-07-31 RX ADMIN — ONDANSETRON 4 MG: 2 INJECTION INTRAMUSCULAR; INTRAVENOUS at 16:59

## 2019-07-31 RX ADMIN — CEFOXITIN 2 G: 2 INJECTION, POWDER, FOR SOLUTION INTRAVENOUS at 14:04

## 2019-07-31 RX ADMIN — KETOROLAC TROMETHAMINE 30 MG: 30 INJECTION, SOLUTION INTRAMUSCULAR at 22:45

## 2019-07-31 RX ADMIN — GABAPENTIN 600 MG: 300 CAPSULE ORAL at 13:03

## 2019-07-31 RX ADMIN — FENTANYL CITRATE 100 MCG: 50 INJECTION INTRAMUSCULAR; INTRAVENOUS at 13:56

## 2019-07-31 RX ADMIN — GLYCOPYRROLATE 0.2 MG: 0.2 INJECTION INTRAMUSCULAR; INTRAVENOUS at 14:36

## 2019-07-31 RX ADMIN — DEXAMETHASONE SODIUM PHOSPHATE 10 MG: 10 INJECTION INTRAMUSCULAR; INTRAVENOUS at 14:04

## 2019-07-31 RX ADMIN — MIDAZOLAM 1 MG: 1 INJECTION INTRAMUSCULAR; INTRAVENOUS at 13:42

## 2019-07-31 RX ADMIN — ROCURONIUM BROMIDE 40 MG: 10 INJECTION, SOLUTION INTRAVENOUS at 13:58

## 2019-07-31 RX ADMIN — FENTANYL CITRATE 25 MCG: 50 INJECTION INTRAMUSCULAR; INTRAVENOUS at 15:33

## 2019-07-31 RX ADMIN — FAMOTIDINE 20 MG: 10 INJECTION INTRAVENOUS at 13:05

## 2019-07-31 RX ADMIN — GLYCOPYRROLATE 0.6 MG: 0.2 INJECTION INTRAMUSCULAR; INTRAVENOUS at 15:55

## 2019-07-31 RX ADMIN — FENTANYL CITRATE 50 MCG: 50 INJECTION, SOLUTION INTRAMUSCULAR; INTRAVENOUS at 15:30

## 2019-07-31 RX ADMIN — SODIUM CHLORIDE, POTASSIUM CHLORIDE, SODIUM LACTATE AND CALCIUM CHLORIDE 1000 ML: 600; 310; 30; 20 INJECTION, SOLUTION INTRAVENOUS at 21:38

## 2019-07-31 RX ADMIN — PROPOFOL 200 MG: 10 INJECTION, EMULSION INTRAVENOUS at 13:58

## 2019-07-31 RX ADMIN — SODIUM CHLORIDE, POTASSIUM CHLORIDE, SODIUM LACTATE AND CALCIUM CHLORIDE 9 ML/HR: 600; 310; 30; 20 INJECTION, SOLUTION INTRAVENOUS at 12:27

## 2019-07-31 RX ADMIN — DEXTROSE AND SODIUM CHLORIDE 100 ML/HR: 5; 450 INJECTION, SOLUTION INTRAVENOUS at 17:51

## 2019-07-31 RX ADMIN — ONDANSETRON 4 MG: 2 INJECTION INTRAMUSCULAR; INTRAVENOUS at 15:40

## 2019-07-31 RX ADMIN — FENTANYL CITRATE 25 MCG: 50 INJECTION INTRAMUSCULAR; INTRAVENOUS at 15:19

## 2019-07-31 RX ADMIN — EPHEDRINE SULFATE 5 MG: 50 INJECTION INTRAMUSCULAR; INTRAVENOUS; SUBCUTANEOUS at 15:06

## 2019-07-31 RX ADMIN — HYDROMORPHONE HYDROCHLORIDE 0.5 MG: 1 INJECTION, SOLUTION INTRAMUSCULAR; INTRAVENOUS; SUBCUTANEOUS at 16:36

## 2019-07-31 RX ADMIN — Medication 3 MG: at 15:55

## 2019-07-31 RX ADMIN — IBUPROFEN 600 MG: 600 TABLET ORAL at 17:51

## 2019-07-31 RX ADMIN — FENTANYL CITRATE 50 MCG: 50 INJECTION, SOLUTION INTRAMUSCULAR; INTRAVENOUS at 16:52

## 2019-07-31 NOTE — ANESTHESIA PROCEDURE NOTES
Airway  Urgency: elective    Date/Time: 7/31/2019 2:02 PM  Airway not difficult    General Information and Staff    Patient location during procedure: OR  Anesthesiologist: Chandrakant Rueda MD  CRNA: Emily Dexter CRNA    Indications and Patient Condition  Indications for airway management: airway protection    Preoxygenated: yes  Mask difficulty assessment: 2 - vent by mask + OA or adjuvant +/- NMBA    Final Airway Details  Final airway type: endotracheal airway      Successful airway: ETT  Cuffed: yes   Successful intubation technique: direct laryngoscopy  Facilitating devices/methods: intubating stylet and cricoid pressure  Endotracheal tube insertion site: oral  Blade: [place]  Blade size: 2  ETT size (mm): 7.0  Cormack-Lehane Classification: grade I - full view of glottis  Placement verified by: chest auscultation and capnometry   Measured from: teeth  ETT to teeth (cm): 19  Number of attempts at approach: 1    Additional Comments  Atraumatic. No dental damage noted.

## 2019-07-31 NOTE — ANESTHESIA POSTPROCEDURE EVALUATION
Patient: Clementina Hernandez    Procedure Summary     Date:  07/31/19 Room / Location:   [facility]  [facility]    Anesthesia Start:  1354 Anesthesia Stop:  1614    Procedures:       VAGINAL HYSTERECTOMY (N/A Uterus)      ANTERIOR AND POSTERIOR VAGINAL REPAIR (N/A Vagina) Diagnosis:       Cystocele with uterine prolapse      (Cystocele with uterine prolapse [N81.4])    Surgeon:  Dru Pena MD Provider:  Chandrakant Rueda MD    Anesthesia Type:  general ASA Status:  2          Anesthesia Type: general  Last vitals  BP   126/95 (07/31/19 1146)   Temp   36.3 °C (97.4 °F) (07/31/19 1146)   Pulse   68 (07/31/19 1146)   Resp   16 (07/31/19 1146)     SpO2   97 % (07/31/19 1146)     Post Anesthesia Care and Evaluation    Patient location during evaluation: bedside  Patient participation: complete - patient participated  Level of consciousness: awake  Pain management: adequate  Airway patency: patent  Anesthetic complications: No anesthetic complications    Cardiovascular status: acceptable  Respiratory status: acceptable  Hydration status: acceptable    Comments: */95 (BP Location: Left arm, Patient Position: Sitting)   Pulse 68   Temp 36.3 °C (97.4 °F) (Oral)   Resp 16   LMP 07/22/2019 (Exact Date)   SpO2 97%

## 2019-07-31 NOTE — ANESTHESIA PREPROCEDURE EVALUATION
" Anesthesia Evaluation     no history of anesthetic complications:               Airway   Mallampati: I  Neck ROM: full  no difficulty expected  Comment: Somewhat of a \"hitch\" in her jaw with opening widely and pop sound  Dental - normal exam     Pulmonary - negative pulmonary ROS and normal exam   (-) COPD, asthma, sleep apnea, not a smoker    PE comment: nonlabored  Cardiovascular - normal exam    Rhythm: regular  Rate: normal    (+) hypertension, hyperlipidemia,   (-) valvular problems/murmurs, past MI, CAD, dysrhythmias, angina      Neuro/Psych- negative ROS  (-) seizures, TIA, CVA    ROS Comment: Spinal cord schwannoma  GI/Hepatic/Renal/Endo    (+) obesity,   hypothyroidism,   (-) GERD, liver disease, no renal disease, diabetes, hyperthyroidism    Musculoskeletal (-) negative ROS    Abdominal    Substance History      OB/GYN          Other                        Anesthesia Plan    ASA 2     general     intravenous induction   Anesthetic plan, all risks, benefits, and alternatives have been provided, discussed and informed consent has been obtained with: patient.      "

## 2019-08-01 PROBLEM — D62 ANEMIA DUE TO ACUTE BLOOD LOSS: Status: ACTIVE | Noted: 2019-08-01

## 2019-08-01 PROBLEM — D64.9 ANEMIA: Status: ACTIVE | Noted: 2019-08-01

## 2019-08-01 LAB
ALBUMIN SERPL-MCNC: 3.5 G/DL (ref 3.5–5.2)
ALBUMIN/GLOB SERPL: 2.1 G/DL
ALP SERPL-CCNC: 42 U/L (ref 39–117)
ALT SERPL W P-5'-P-CCNC: 11 U/L (ref 1–33)
ANION GAP SERPL CALCULATED.3IONS-SCNC: 10.8 MMOL/L (ref 5–15)
AST SERPL-CCNC: 9 U/L (ref 1–32)
BILIRUB SERPL-MCNC: 0.3 MG/DL (ref 0.2–1.2)
BUN BLD-MCNC: 8 MG/DL (ref 6–20)
BUN/CREAT SERPL: 14.8 (ref 7–25)
CALCIUM SPEC-SCNC: 8.1 MG/DL (ref 8.6–10.5)
CHLORIDE SERPL-SCNC: 98 MMOL/L (ref 98–107)
CO2 SERPL-SCNC: 25.2 MMOL/L (ref 22–29)
CREAT BLD-MCNC: 0.54 MG/DL (ref 0.57–1)
DEPRECATED RDW RBC AUTO: 42.5 FL (ref 37–54)
DEPRECATED RDW RBC AUTO: 42.5 FL (ref 37–54)
DEPRECATED RDW RBC AUTO: 43.6 FL (ref 37–54)
ERYTHROCYTE [DISTWIDTH] IN BLOOD BY AUTOMATED COUNT: 12.8 % (ref 12.3–15.4)
ERYTHROCYTE [DISTWIDTH] IN BLOOD BY AUTOMATED COUNT: 12.9 % (ref 12.3–15.4)
ERYTHROCYTE [DISTWIDTH] IN BLOOD BY AUTOMATED COUNT: 13.5 % (ref 12.3–15.4)
GFR SERPL CREATININE-BSD FRML MDRD: 127 ML/MIN/1.73
GLOBULIN UR ELPH-MCNC: 1.7 GM/DL
GLUCOSE BLD-MCNC: 147 MG/DL (ref 65–99)
HCT VFR BLD AUTO: 26.1 % (ref 34–46.6)
HCT VFR BLD AUTO: 27.6 % (ref 34–46.6)
HCT VFR BLD AUTO: 31 % (ref 34–46.6)
HGB BLD-MCNC: 10.1 G/DL (ref 12–15.9)
HGB BLD-MCNC: 8.4 G/DL (ref 12–15.9)
HGB BLD-MCNC: 8.7 G/DL (ref 12–15.9)
MCH RBC QN AUTO: 28.2 PG (ref 26.6–33)
MCH RBC QN AUTO: 28.8 PG (ref 26.6–33)
MCH RBC QN AUTO: 29.1 PG (ref 26.6–33)
MCHC RBC AUTO-ENTMCNC: 31.5 G/DL (ref 31.5–35.7)
MCHC RBC AUTO-ENTMCNC: 32.2 G/DL (ref 31.5–35.7)
MCHC RBC AUTO-ENTMCNC: 32.6 G/DL (ref 31.5–35.7)
MCV RBC AUTO: 88.3 FL (ref 79–97)
MCV RBC AUTO: 89.6 FL (ref 79–97)
MCV RBC AUTO: 90.3 FL (ref 79–97)
PLATELET # BLD AUTO: 205 10*3/MM3 (ref 140–450)
PLATELET # BLD AUTO: 248 10*3/MM3 (ref 140–450)
PLATELET # BLD AUTO: 248 10*3/MM3 (ref 140–450)
PMV BLD AUTO: 11.5 FL (ref 6–12)
PMV BLD AUTO: 11.9 FL (ref 6–12)
PMV BLD AUTO: 12 FL (ref 6–12)
POTASSIUM BLD-SCNC: 4.4 MMOL/L (ref 3.5–5.2)
PROT SERPL-MCNC: 5.2 G/DL (ref 6–8.5)
RBC # BLD AUTO: 2.89 10*6/MM3 (ref 3.77–5.28)
RBC # BLD AUTO: 3.08 10*6/MM3 (ref 3.77–5.28)
RBC # BLD AUTO: 3.51 10*6/MM3 (ref 3.77–5.28)
SODIUM BLD-SCNC: 134 MMOL/L (ref 136–145)
TROPONIN T SERPL-MCNC: <0.01 NG/ML (ref 0–0.03)
WBC NRBC COR # BLD: 15.17 10*3/MM3 (ref 3.4–10.8)
WBC NRBC COR # BLD: 16.01 10*3/MM3 (ref 3.4–10.8)
WBC NRBC COR # BLD: 16.27 10*3/MM3 (ref 3.4–10.8)

## 2019-08-01 PROCEDURE — P9016 RBC LEUKOCYTES REDUCED: HCPCS

## 2019-08-01 PROCEDURE — 25010000002 CEFAZOLIN PER 500 MG: Performed by: OBSTETRICS & GYNECOLOGY

## 2019-08-01 PROCEDURE — 36430 TRANSFUSION BLD/BLD COMPNT: CPT

## 2019-08-01 PROCEDURE — 80053 COMPREHEN METABOLIC PANEL: CPT | Performed by: OBSTETRICS & GYNECOLOGY

## 2019-08-01 PROCEDURE — 25010000002 ONDANSETRON PER 1 MG: Performed by: OBSTETRICS & GYNECOLOGY

## 2019-08-01 PROCEDURE — 86900 BLOOD TYPING SEROLOGIC ABO: CPT

## 2019-08-01 PROCEDURE — 84484 ASSAY OF TROPONIN QUANT: CPT | Performed by: OBSTETRICS & GYNECOLOGY

## 2019-08-01 PROCEDURE — 85027 COMPLETE CBC AUTOMATED: CPT | Performed by: OBSTETRICS & GYNECOLOGY

## 2019-08-01 RX ORDER — CEFAZOLIN SODIUM IN 0.9 % NACL 3 G/100 ML
3 INTRAVENOUS SOLUTION, PIGGYBACK (ML) INTRAVENOUS EVERY 8 HOURS
Status: COMPLETED | OUTPATIENT
Start: 2019-08-01 | End: 2019-08-02

## 2019-08-01 RX ORDER — SIMETHICONE 80 MG
80 TABLET,CHEWABLE ORAL 4 TIMES DAILY
Status: DISCONTINUED | OUTPATIENT
Start: 2019-08-01 | End: 2019-08-05 | Stop reason: HOSPADM

## 2019-08-01 RX ORDER — FAMOTIDINE 20 MG/1
20 TABLET, FILM COATED ORAL
Status: DISCONTINUED | OUTPATIENT
Start: 2019-08-01 | End: 2019-08-05 | Stop reason: HOSPADM

## 2019-08-01 RX ADMIN — ONDANSETRON 4 MG: 2 INJECTION INTRAMUSCULAR; INTRAVENOUS at 10:05

## 2019-08-01 RX ADMIN — IBUPROFEN 600 MG: 600 TABLET ORAL at 10:02

## 2019-08-01 RX ADMIN — DOCUSATE SODIUM 100 MG: 100 CAPSULE, LIQUID FILLED ORAL at 20:41

## 2019-08-01 RX ADMIN — Medication 80 MG: at 02:38

## 2019-08-01 RX ADMIN — IBUPROFEN 600 MG: 600 TABLET ORAL at 02:38

## 2019-08-01 RX ADMIN — SIMETHICONE CHEW TAB 80 MG 80 MG: 80 TABLET ORAL at 09:27

## 2019-08-01 RX ADMIN — CEFAZOLIN 3 G: 10 INJECTION, POWDER, FOR SOLUTION INTRAVENOUS; PARENTERAL at 21:50

## 2019-08-01 RX ADMIN — SODIUM CHLORIDE 3 G: 9 INJECTION, SOLUTION INTRAVENOUS at 09:26

## 2019-08-01 RX ADMIN — SODIUM CHLORIDE, POTASSIUM CHLORIDE, SODIUM LACTATE AND CALCIUM CHLORIDE 175 ML/HR: 600; 310; 30; 20 INJECTION, SOLUTION INTRAVENOUS at 02:13

## 2019-08-01 RX ADMIN — SODIUM CHLORIDE, POTASSIUM CHLORIDE, SODIUM LACTATE AND CALCIUM CHLORIDE 175 ML/HR: 600; 310; 30; 20 INJECTION, SOLUTION INTRAVENOUS at 05:10

## 2019-08-01 RX ADMIN — SIMETHICONE CHEW TAB 80 MG 80 MG: 80 TABLET ORAL at 20:40

## 2019-08-01 RX ADMIN — ACETAMINOPHEN AND CODEINE PHOSPHATE 1 TABLET: 300; 30 TABLET ORAL at 21:47

## 2019-08-01 RX ADMIN — IBUPROFEN 600 MG: 600 TABLET ORAL at 17:06

## 2019-08-02 ENCOUNTER — APPOINTMENT (OUTPATIENT)
Dept: CT IMAGING | Facility: HOSPITAL | Age: 39
End: 2019-08-02

## 2019-08-02 LAB
ABO + RH BLD: NORMAL
ABO + RH BLD: NORMAL
BH BB BLOOD EXPIRATION DATE: NORMAL
BH BB BLOOD EXPIRATION DATE: NORMAL
BH BB BLOOD TYPE BARCODE: 7300
BH BB BLOOD TYPE BARCODE: 7300
BH BB DISPENSE STATUS: NORMAL
BH BB DISPENSE STATUS: NORMAL
BH BB PRODUCT CODE: NORMAL
BH BB PRODUCT CODE: NORMAL
BH BB UNIT NUMBER: NORMAL
BH BB UNIT NUMBER: NORMAL
CYTO UR: NORMAL
DEPRECATED RDW RBC AUTO: 45.1 FL (ref 37–54)
DEPRECATED RDW RBC AUTO: 45.1 FL (ref 37–54)
ERYTHROCYTE [DISTWIDTH] IN BLOOD BY AUTOMATED COUNT: 13.7 % (ref 12.3–15.4)
ERYTHROCYTE [DISTWIDTH] IN BLOOD BY AUTOMATED COUNT: 14 % (ref 12.3–15.4)
HCT VFR BLD AUTO: 24 % (ref 34–46.6)
HCT VFR BLD AUTO: 25.1 % (ref 34–46.6)
HCT VFR BLD AUTO: 30.9 % (ref 34–46.6)
HGB BLD-MCNC: 10 G/DL (ref 12–15.9)
HGB BLD-MCNC: 7.8 G/DL (ref 12–15.9)
HGB BLD-MCNC: 8.1 G/DL (ref 12–15.9)
LAB AP CASE REPORT: NORMAL
MCH RBC QN AUTO: 28.7 PG (ref 26.6–33)
MCH RBC QN AUTO: 28.7 PG (ref 26.6–33)
MCHC RBC AUTO-ENTMCNC: 32.3 G/DL (ref 31.5–35.7)
MCHC RBC AUTO-ENTMCNC: 32.5 G/DL (ref 31.5–35.7)
MCV RBC AUTO: 88.2 FL (ref 79–97)
MCV RBC AUTO: 89 FL (ref 79–97)
PATH REPORT.FINAL DX SPEC: NORMAL
PATH REPORT.GROSS SPEC: NORMAL
PLATELET # BLD AUTO: 151 10*3/MM3 (ref 140–450)
PLATELET # BLD AUTO: 158 10*3/MM3 (ref 140–450)
PMV BLD AUTO: 11.2 FL (ref 6–12)
PMV BLD AUTO: 11.8 FL (ref 6–12)
RBC # BLD AUTO: 2.72 10*6/MM3 (ref 3.77–5.28)
RBC # BLD AUTO: 2.82 10*6/MM3 (ref 3.77–5.28)
UNIT  ABO: NORMAL
UNIT  ABO: NORMAL
UNIT  RH: NORMAL
UNIT  RH: NORMAL
WBC NRBC COR # BLD: 8.61 10*3/MM3 (ref 3.4–10.8)
WBC NRBC COR # BLD: 9.87 10*3/MM3 (ref 3.4–10.8)

## 2019-08-02 PROCEDURE — 85018 HEMOGLOBIN: CPT | Performed by: SURGERY

## 2019-08-02 PROCEDURE — 25010000002 IOPAMIDOL 61 % SOLUTION: Performed by: OBSTETRICS & GYNECOLOGY

## 2019-08-02 PROCEDURE — 85014 HEMATOCRIT: CPT | Performed by: SURGERY

## 2019-08-02 PROCEDURE — 25010000002 CEFAZOLIN PER 500 MG: Performed by: OBSTETRICS & GYNECOLOGY

## 2019-08-02 PROCEDURE — 86900 BLOOD TYPING SEROLOGIC ABO: CPT

## 2019-08-02 PROCEDURE — 85027 COMPLETE CBC AUTOMATED: CPT | Performed by: OBSTETRICS & GYNECOLOGY

## 2019-08-02 PROCEDURE — 74177 CT ABD & PELVIS W/CONTRAST: CPT

## 2019-08-02 PROCEDURE — P9016 RBC LEUKOCYTES REDUCED: HCPCS

## 2019-08-02 PROCEDURE — 99253 IP/OBS CNSLTJ NEW/EST LOW 45: CPT | Performed by: SURGERY

## 2019-08-02 PROCEDURE — 36430 TRANSFUSION BLD/BLD COMPNT: CPT

## 2019-08-02 RX ADMIN — ACETAMINOPHEN AND CODEINE PHOSPHATE 1 TABLET: 300; 30 TABLET ORAL at 01:51

## 2019-08-02 RX ADMIN — IBUPROFEN 600 MG: 600 TABLET ORAL at 07:03

## 2019-08-02 RX ADMIN — SIMETHICONE CHEW TAB 80 MG 80 MG: 80 TABLET ORAL at 17:02

## 2019-08-02 RX ADMIN — SIMETHICONE CHEW TAB 80 MG 80 MG: 80 TABLET ORAL at 20:55

## 2019-08-02 RX ADMIN — ACETAMINOPHEN AND CODEINE PHOSPHATE 1 TABLET: 300; 30 TABLET ORAL at 17:03

## 2019-08-02 RX ADMIN — IOPAMIDOL 90 ML: 612 INJECTION, SOLUTION INTRAVENOUS at 09:51

## 2019-08-02 RX ADMIN — ACETAMINOPHEN AND CODEINE PHOSPHATE 2 TABLET: 300; 30 TABLET ORAL at 22:15

## 2019-08-02 RX ADMIN — SODIUM CHLORIDE, POTASSIUM CHLORIDE, SODIUM LACTATE AND CALCIUM CHLORIDE 125 ML/HR: 600; 310; 30; 20 INJECTION, SOLUTION INTRAVENOUS at 22:57

## 2019-08-02 RX ADMIN — SIMETHICONE CHEW TAB 80 MG 80 MG: 80 TABLET ORAL at 07:44

## 2019-08-02 RX ADMIN — ACETAMINOPHEN AND CODEINE PHOSPHATE 1 TABLET: 300; 30 TABLET ORAL at 21:20

## 2019-08-02 RX ADMIN — DOCUSATE SODIUM 100 MG: 100 CAPSULE, LIQUID FILLED ORAL at 20:54

## 2019-08-02 RX ADMIN — FAMOTIDINE 20 MG: 20 TABLET, FILM COATED ORAL at 17:02

## 2019-08-02 RX ADMIN — FAMOTIDINE 20 MG: 20 TABLET, FILM COATED ORAL at 07:03

## 2019-08-02 RX ADMIN — DOCUSATE SODIUM 100 MG: 100 CAPSULE, LIQUID FILLED ORAL at 07:46

## 2019-08-02 RX ADMIN — CEFAZOLIN 3 G: 10 INJECTION, POWDER, FOR SOLUTION INTRAVENOUS; PARENTERAL at 06:25

## 2019-08-02 RX ADMIN — SODIUM CHLORIDE, POTASSIUM CHLORIDE, SODIUM LACTATE AND CALCIUM CHLORIDE 125 ML/HR: 600; 310; 30; 20 INJECTION, SOLUTION INTRAVENOUS at 14:47

## 2019-08-02 RX ADMIN — IBUPROFEN 600 MG: 600 TABLET ORAL at 01:07

## 2019-08-02 RX ADMIN — ACETAMINOPHEN AND CODEINE PHOSPHATE 1 TABLET: 300; 30 TABLET ORAL at 12:29

## 2019-08-03 ENCOUNTER — ANESTHESIA (OUTPATIENT)
Dept: PERIOP | Facility: HOSPITAL | Age: 39
End: 2019-08-03

## 2019-08-03 ENCOUNTER — ANESTHESIA EVENT (OUTPATIENT)
Dept: PERIOP | Facility: HOSPITAL | Age: 39
End: 2019-08-03

## 2019-08-03 LAB
ABO + RH BLD: NORMAL
ABO + RH BLD: NORMAL
BH BB BLOOD EXPIRATION DATE: NORMAL
BH BB BLOOD EXPIRATION DATE: NORMAL
BH BB BLOOD TYPE BARCODE: 7300
BH BB BLOOD TYPE BARCODE: 7300
BH BB DISPENSE STATUS: NORMAL
BH BB DISPENSE STATUS: NORMAL
BH BB PRODUCT CODE: NORMAL
BH BB PRODUCT CODE: NORMAL
BH BB UNIT NUMBER: NORMAL
BH BB UNIT NUMBER: NORMAL
HCT VFR BLD AUTO: 26 % (ref 34–46.6)
HCT VFR BLD AUTO: 26.9 % (ref 34–46.6)
HCT VFR BLD AUTO: 28 % (ref 34–46.6)
HGB BLD-MCNC: 8.5 G/DL (ref 12–15.9)
HGB BLD-MCNC: 8.8 G/DL (ref 12–15.9)
HGB BLD-MCNC: 9.1 G/DL (ref 12–15.9)
UNIT  ABO: NORMAL
UNIT  ABO: NORMAL
UNIT  RH: NORMAL
UNIT  RH: NORMAL

## 2019-08-03 PROCEDURE — 85014 HEMATOCRIT: CPT | Performed by: SURGERY

## 2019-08-03 PROCEDURE — 25010000002 DEXAMETHASONE PER 1 MG: Performed by: ANESTHESIOLOGY

## 2019-08-03 PROCEDURE — 49322 LAPAROSCOPY ASPIRATION: CPT | Performed by: OBSTETRICS & GYNECOLOGY

## 2019-08-03 PROCEDURE — 25010000002 CEFAZOLIN PER 500 MG: Performed by: SURGERY

## 2019-08-03 PROCEDURE — 25010000002 KETOROLAC TROMETHAMINE PER 15 MG: Performed by: ANESTHESIOLOGY

## 2019-08-03 PROCEDURE — 25010000002 ONDANSETRON PER 1 MG: Performed by: ANESTHESIOLOGY

## 2019-08-03 PROCEDURE — 25010000002 PROPOFOL 10 MG/ML EMULSION: Performed by: ANESTHESIOLOGY

## 2019-08-03 PROCEDURE — 85018 HEMOGLOBIN: CPT | Performed by: SURGERY

## 2019-08-03 PROCEDURE — 25010000002 MIDAZOLAM PER 1 MG: Performed by: ANESTHESIOLOGY

## 2019-08-03 PROCEDURE — 99231 SBSQ HOSP IP/OBS SF/LOW 25: CPT | Performed by: SURGERY

## 2019-08-03 PROCEDURE — 0W3J4ZZ CONTROL BLEEDING IN PELVIC CAVITY, PERCUTANEOUS ENDOSCOPIC APPROACH: ICD-10-PCS | Performed by: OBSTETRICS & GYNECOLOGY

## 2019-08-03 PROCEDURE — 25010000002 HYDROMORPHONE PER 4 MG: Performed by: ANESTHESIOLOGY

## 2019-08-03 PROCEDURE — 49322 LAPAROSCOPY ASPIRATION: CPT | Performed by: SURGERY

## 2019-08-03 PROCEDURE — 0WCJ4ZZ EXTIRPATION OF MATTER FROM PELVIC CAVITY, PERCUTANEOUS ENDOSCOPIC APPROACH: ICD-10-PCS | Performed by: SURGERY

## 2019-08-03 PROCEDURE — 25010000002 FENTANYL CITRATE (PF) 100 MCG/2ML SOLUTION: Performed by: ANESTHESIOLOGY

## 2019-08-03 DEVICE — CLIP LIGAT VASC HORIZON TI MD/LG GRN 6CT: Type: IMPLANTABLE DEVICE | Status: FUNCTIONAL

## 2019-08-03 RX ORDER — FENTANYL CITRATE 50 UG/ML
INJECTION, SOLUTION INTRAMUSCULAR; INTRAVENOUS AS NEEDED
Status: DISCONTINUED | OUTPATIENT
Start: 2019-08-03 | End: 2019-08-03 | Stop reason: SURG

## 2019-08-03 RX ORDER — SODIUM CHLORIDE, SODIUM LACTATE, POTASSIUM CHLORIDE, CALCIUM CHLORIDE 600; 310; 30; 20 MG/100ML; MG/100ML; MG/100ML; MG/100ML
9 INJECTION, SOLUTION INTRAVENOUS CONTINUOUS
Status: DISCONTINUED | OUTPATIENT
Start: 2019-08-03 | End: 2019-08-03

## 2019-08-03 RX ORDER — PROMETHAZINE HYDROCHLORIDE 25 MG/ML
6.25 INJECTION, SOLUTION INTRAMUSCULAR; INTRAVENOUS
Status: DISCONTINUED | OUTPATIENT
Start: 2019-08-03 | End: 2019-08-03 | Stop reason: HOSPADM

## 2019-08-03 RX ORDER — PROMETHAZINE HYDROCHLORIDE 25 MG/ML
12.5 INJECTION, SOLUTION INTRAMUSCULAR; INTRAVENOUS ONCE AS NEEDED
Status: DISCONTINUED | OUTPATIENT
Start: 2019-08-03 | End: 2019-08-03 | Stop reason: HOSPADM

## 2019-08-03 RX ORDER — LIDOCAINE HYDROCHLORIDE 10 MG/ML
0.5 INJECTION, SOLUTION EPIDURAL; INFILTRATION; INTRACAUDAL; PERINEURAL ONCE AS NEEDED
Status: DISCONTINUED | OUTPATIENT
Start: 2019-08-03 | End: 2019-08-03 | Stop reason: HOSPADM

## 2019-08-03 RX ORDER — FAMOTIDINE 10 MG/ML
20 INJECTION, SOLUTION INTRAVENOUS ONCE
Status: COMPLETED | OUTPATIENT
Start: 2019-08-03 | End: 2019-08-03

## 2019-08-03 RX ORDER — ACETAMINOPHEN 325 MG/1
650 TABLET ORAL ONCE AS NEEDED
Status: DISCONTINUED | OUTPATIENT
Start: 2019-08-03 | End: 2019-08-03 | Stop reason: HOSPADM

## 2019-08-03 RX ORDER — PROMETHAZINE HYDROCHLORIDE 25 MG/1
25 SUPPOSITORY RECTAL ONCE AS NEEDED
Status: DISCONTINUED | OUTPATIENT
Start: 2019-08-03 | End: 2019-08-03 | Stop reason: HOSPADM

## 2019-08-03 RX ORDER — KETOROLAC TROMETHAMINE 30 MG/ML
INJECTION, SOLUTION INTRAMUSCULAR; INTRAVENOUS AS NEEDED
Status: DISCONTINUED | OUTPATIENT
Start: 2019-08-03 | End: 2019-08-03 | Stop reason: SURG

## 2019-08-03 RX ORDER — FLUMAZENIL 0.1 MG/ML
0.2 INJECTION INTRAVENOUS AS NEEDED
Status: DISCONTINUED | OUTPATIENT
Start: 2019-08-03 | End: 2019-08-03 | Stop reason: HOSPADM

## 2019-08-03 RX ORDER — PROPOFOL 10 MG/ML
VIAL (ML) INTRAVENOUS AS NEEDED
Status: DISCONTINUED | OUTPATIENT
Start: 2019-08-03 | End: 2019-08-03 | Stop reason: SURG

## 2019-08-03 RX ORDER — NALOXONE HCL 0.4 MG/ML
0.2 VIAL (ML) INJECTION AS NEEDED
Status: DISCONTINUED | OUTPATIENT
Start: 2019-08-03 | End: 2019-08-03 | Stop reason: HOSPADM

## 2019-08-03 RX ORDER — DIPHENHYDRAMINE HYDROCHLORIDE 50 MG/ML
12.5 INJECTION INTRAMUSCULAR; INTRAVENOUS
Status: DISCONTINUED | OUTPATIENT
Start: 2019-08-03 | End: 2019-08-03 | Stop reason: HOSPADM

## 2019-08-03 RX ORDER — HYDROMORPHONE HYDROCHLORIDE 1 MG/ML
0.5 INJECTION, SOLUTION INTRAMUSCULAR; INTRAVENOUS; SUBCUTANEOUS
Status: DISCONTINUED | OUTPATIENT
Start: 2019-08-03 | End: 2019-08-03 | Stop reason: HOSPADM

## 2019-08-03 RX ORDER — MIDAZOLAM HYDROCHLORIDE 1 MG/ML
2 INJECTION INTRAMUSCULAR; INTRAVENOUS
Status: DISCONTINUED | OUTPATIENT
Start: 2019-08-03 | End: 2019-08-03 | Stop reason: HOSPADM

## 2019-08-03 RX ORDER — ROCURONIUM BROMIDE 10 MG/ML
INJECTION, SOLUTION INTRAVENOUS AS NEEDED
Status: DISCONTINUED | OUTPATIENT
Start: 2019-08-03 | End: 2019-08-03 | Stop reason: SURG

## 2019-08-03 RX ORDER — SODIUM CHLORIDE 9 MG/ML
INJECTION, SOLUTION INTRAVENOUS AS NEEDED
Status: DISCONTINUED | OUTPATIENT
Start: 2019-08-03 | End: 2019-08-03 | Stop reason: HOSPADM

## 2019-08-03 RX ORDER — ONDANSETRON 2 MG/ML
4 INJECTION INTRAMUSCULAR; INTRAVENOUS ONCE AS NEEDED
Status: DISCONTINUED | OUTPATIENT
Start: 2019-08-03 | End: 2019-08-03 | Stop reason: HOSPADM

## 2019-08-03 RX ORDER — HYDROMORPHONE HCL 110MG/55ML
PATIENT CONTROLLED ANALGESIA SYRINGE INTRAVENOUS AS NEEDED
Status: DISCONTINUED | OUTPATIENT
Start: 2019-08-03 | End: 2019-08-03 | Stop reason: SURG

## 2019-08-03 RX ORDER — DIPHENHYDRAMINE HCL 25 MG
25 CAPSULE ORAL
Status: DISCONTINUED | OUTPATIENT
Start: 2019-08-03 | End: 2019-08-03 | Stop reason: HOSPADM

## 2019-08-03 RX ORDER — CEFAZOLIN SODIUM IN 0.9 % NACL 3 G/100 ML
3 INTRAVENOUS SOLUTION, PIGGYBACK (ML) INTRAVENOUS ONCE
Status: COMPLETED | OUTPATIENT
Start: 2019-08-03 | End: 2019-08-03

## 2019-08-03 RX ORDER — HYDROCODONE BITARTRATE AND ACETAMINOPHEN 7.5; 325 MG/1; MG/1
1 TABLET ORAL ONCE AS NEEDED
Status: DISCONTINUED | OUTPATIENT
Start: 2019-08-03 | End: 2019-08-03 | Stop reason: HOSPADM

## 2019-08-03 RX ORDER — SODIUM CHLORIDE 0.9 % (FLUSH) 0.9 %
1-10 SYRINGE (ML) INJECTION AS NEEDED
Status: DISCONTINUED | OUTPATIENT
Start: 2019-08-03 | End: 2019-08-03 | Stop reason: HOSPADM

## 2019-08-03 RX ORDER — FENTANYL CITRATE 50 UG/ML
INJECTION, SOLUTION INTRAMUSCULAR; INTRAVENOUS
Status: COMPLETED
Start: 2019-08-03 | End: 2019-08-03

## 2019-08-03 RX ORDER — DEXAMETHASONE SODIUM PHOSPHATE 10 MG/ML
INJECTION INTRAMUSCULAR; INTRAVENOUS AS NEEDED
Status: DISCONTINUED | OUTPATIENT
Start: 2019-08-03 | End: 2019-08-03 | Stop reason: SURG

## 2019-08-03 RX ORDER — FENTANYL CITRATE 50 UG/ML
50 INJECTION, SOLUTION INTRAMUSCULAR; INTRAVENOUS
Status: DISCONTINUED | OUTPATIENT
Start: 2019-08-03 | End: 2019-08-03 | Stop reason: HOSPADM

## 2019-08-03 RX ORDER — LIDOCAINE HYDROCHLORIDE 20 MG/ML
INJECTION, SOLUTION INFILTRATION; PERINEURAL AS NEEDED
Status: DISCONTINUED | OUTPATIENT
Start: 2019-08-03 | End: 2019-08-03 | Stop reason: SURG

## 2019-08-03 RX ORDER — HYDRALAZINE HYDROCHLORIDE 20 MG/ML
5 INJECTION INTRAMUSCULAR; INTRAVENOUS
Status: DISCONTINUED | OUTPATIENT
Start: 2019-08-03 | End: 2019-08-03 | Stop reason: HOSPADM

## 2019-08-03 RX ORDER — OXYCODONE AND ACETAMINOPHEN 7.5; 325 MG/1; MG/1
1 TABLET ORAL ONCE AS NEEDED
Status: DISCONTINUED | OUTPATIENT
Start: 2019-08-03 | End: 2019-08-03 | Stop reason: HOSPADM

## 2019-08-03 RX ORDER — BUPIVACAINE HYDROCHLORIDE AND EPINEPHRINE 5; 5 MG/ML; UG/ML
INJECTION, SOLUTION PERINEURAL AS NEEDED
Status: DISCONTINUED | OUTPATIENT
Start: 2019-08-03 | End: 2019-08-03 | Stop reason: HOSPADM

## 2019-08-03 RX ORDER — ONDANSETRON 2 MG/ML
INJECTION INTRAMUSCULAR; INTRAVENOUS AS NEEDED
Status: DISCONTINUED | OUTPATIENT
Start: 2019-08-03 | End: 2019-08-03 | Stop reason: SURG

## 2019-08-03 RX ORDER — SODIUM CHLORIDE, SODIUM LACTATE, POTASSIUM CHLORIDE, CALCIUM CHLORIDE 600; 310; 30; 20 MG/100ML; MG/100ML; MG/100ML; MG/100ML
100 INJECTION, SOLUTION INTRAVENOUS CONTINUOUS
Status: DISCONTINUED | OUTPATIENT
Start: 2019-08-03 | End: 2019-08-04

## 2019-08-03 RX ORDER — PROMETHAZINE HYDROCHLORIDE 25 MG/1
25 TABLET ORAL ONCE AS NEEDED
Status: DISCONTINUED | OUTPATIENT
Start: 2019-08-03 | End: 2019-08-03 | Stop reason: HOSPADM

## 2019-08-03 RX ORDER — EPHEDRINE SULFATE 50 MG/ML
5 INJECTION, SOLUTION INTRAVENOUS ONCE AS NEEDED
Status: DISCONTINUED | OUTPATIENT
Start: 2019-08-03 | End: 2019-08-03 | Stop reason: HOSPADM

## 2019-08-03 RX ORDER — MIDAZOLAM HYDROCHLORIDE 1 MG/ML
1 INJECTION INTRAMUSCULAR; INTRAVENOUS
Status: DISCONTINUED | OUTPATIENT
Start: 2019-08-03 | End: 2019-08-03 | Stop reason: HOSPADM

## 2019-08-03 RX ADMIN — ACETAMINOPHEN AND CODEINE PHOSPHATE 1 TABLET: 300; 30 TABLET ORAL at 02:16

## 2019-08-03 RX ADMIN — SIMETHICONE CHEW TAB 80 MG 80 MG: 80 TABLET ORAL at 17:30

## 2019-08-03 RX ADMIN — FAMOTIDINE 20 MG: 10 INJECTION INTRAVENOUS at 13:01

## 2019-08-03 RX ADMIN — SODIUM CHLORIDE 3 G: 9 INJECTION, SOLUTION INTRAVENOUS at 13:34

## 2019-08-03 RX ADMIN — KETOROLAC TROMETHAMINE 30 MG: 30 INJECTION, SOLUTION INTRAMUSCULAR; INTRAVENOUS at 14:47

## 2019-08-03 RX ADMIN — IBUPROFEN 600 MG: 600 TABLET ORAL at 17:28

## 2019-08-03 RX ADMIN — HYDROMORPHONE HYDROCHLORIDE 0.5 MG: 2 INJECTION INTRAMUSCULAR; INTRAVENOUS; SUBCUTANEOUS at 14:26

## 2019-08-03 RX ADMIN — ROCURONIUM BROMIDE 10 MG: 10 INJECTION INTRAVENOUS at 14:26

## 2019-08-03 RX ADMIN — SODIUM CHLORIDE, POTASSIUM CHLORIDE, SODIUM LACTATE AND CALCIUM CHLORIDE 125 ML/HR: 600; 310; 30; 20 INJECTION, SOLUTION INTRAVENOUS at 07:45

## 2019-08-03 RX ADMIN — SODIUM CHLORIDE, POTASSIUM CHLORIDE, SODIUM LACTATE AND CALCIUM CHLORIDE: 600; 310; 30; 20 INJECTION, SOLUTION INTRAVENOUS at 13:10

## 2019-08-03 RX ADMIN — DEXAMETHASONE SODIUM PHOSPHATE 8 MG: 10 INJECTION INTRAMUSCULAR; INTRAVENOUS at 13:40

## 2019-08-03 RX ADMIN — PROPOFOL 160 MG: 10 INJECTION, EMULSION INTRAVENOUS at 13:21

## 2019-08-03 RX ADMIN — SODIUM CHLORIDE, POTASSIUM CHLORIDE, SODIUM LACTATE AND CALCIUM CHLORIDE 100 ML/HR: 600; 310; 30; 20 INJECTION, SOLUTION INTRAVENOUS at 20:32

## 2019-08-03 RX ADMIN — PROPOFOL 40 MG: 10 INJECTION, EMULSION INTRAVENOUS at 13:26

## 2019-08-03 RX ADMIN — LIDOCAINE HYDROCHLORIDE 60 MG: 20 INJECTION, SOLUTION INFILTRATION; PERINEURAL at 13:21

## 2019-08-03 RX ADMIN — HYDROMORPHONE HYDROCHLORIDE 0.5 MG: 2 INJECTION INTRAMUSCULAR; INTRAVENOUS; SUBCUTANEOUS at 14:21

## 2019-08-03 RX ADMIN — FENTANYL CITRATE 100 MCG: 50 INJECTION INTRAMUSCULAR; INTRAVENOUS at 13:19

## 2019-08-03 RX ADMIN — FAMOTIDINE 20 MG: 20 TABLET, FILM COATED ORAL at 17:30

## 2019-08-03 RX ADMIN — SIMETHICONE CHEW TAB 80 MG 80 MG: 80 TABLET ORAL at 08:25

## 2019-08-03 RX ADMIN — SIMETHICONE CHEW TAB 80 MG 80 MG: 80 TABLET ORAL at 20:27

## 2019-08-03 RX ADMIN — IBUPROFEN 600 MG: 600 TABLET ORAL at 02:18

## 2019-08-03 RX ADMIN — SODIUM CHLORIDE, POTASSIUM CHLORIDE, SODIUM LACTATE AND CALCIUM CHLORIDE 9 ML/HR: 600; 310; 30; 20 INJECTION, SOLUTION INTRAVENOUS at 13:01

## 2019-08-03 RX ADMIN — ROCURONIUM BROMIDE 40 MG: 10 INJECTION INTRAVENOUS at 13:21

## 2019-08-03 RX ADMIN — MIDAZOLAM 1 MG: 1 INJECTION INTRAMUSCULAR; INTRAVENOUS at 13:01

## 2019-08-03 RX ADMIN — ONDANSETRON 4 MG: 2 INJECTION INTRAMUSCULAR; INTRAVENOUS at 14:45

## 2019-08-03 RX ADMIN — FAMOTIDINE 20 MG: 20 TABLET, FILM COATED ORAL at 06:33

## 2019-08-03 NOTE — ANESTHESIA PROCEDURE NOTES
Airway  Urgency: elective      General Information and Staff    Patient location during procedure: OR  Anesthesiologist: Yobani Krishnan MD    Indications and Patient Condition  Indications for airway management: airway protection    Preoxygenated: yes  Mask difficulty assessment: 1 - vent by mask    Final Airway Details  Final airway type: endotracheal airway      Successful airway: ETT  Cuffed: yes   Successful intubation technique: direct laryngoscopy  Endotracheal tube insertion site: oral  Blade: Gary  Blade size: 3  ETT size (mm): 7.0  Cormack-Lehane Classification: grade I - full view of glottis  Placement verified by: chest auscultation and capnometry   Measured from: lips  ETT to lips (cm): 20  Number of attempts at approach: 1    Additional Comments  IV induction as noted, atraumatic intubation

## 2019-08-03 NOTE — ANESTHESIA PREPROCEDURE EVALUATION
Anesthesia Evaluation     Patient summary reviewed and Nursing notes reviewed   NPO Solid Status: > 8 hours  NPO Liquid Status: > 8 hours           Airway   Dental          Pulmonary - negative pulmonary ROS and normal exam    breath sounds clear to auscultation  Cardiovascular - negative cardio ROS and normal exam    ECG reviewed        Neuro/Psych- negative ROS  GI/Hepatic/Renal/Endo - negative ROS     Musculoskeletal (-) negative ROS    Abdominal    Substance History - negative use     OB/GYN          Other - negative ROS           Phys Exam Other: Rt. TMJ problem                Anesthesia Plan    ASA 2 - emergent     general     intravenous induction   Anesthetic plan, all risks, benefits, and alternatives have been provided, discussed and informed consent has been obtained with: patient.

## 2019-08-04 LAB
HCT VFR BLD AUTO: 28 % (ref 34–46.6)
HCT VFR BLD AUTO: 28 % (ref 34–46.6)
HGB BLD-MCNC: 8.9 G/DL (ref 12–15.9)
HGB BLD-MCNC: 9.3 G/DL (ref 12–15.9)

## 2019-08-04 PROCEDURE — 85014 HEMATOCRIT: CPT | Performed by: SURGERY

## 2019-08-04 PROCEDURE — 99024 POSTOP FOLLOW-UP VISIT: CPT | Performed by: SURGERY

## 2019-08-04 PROCEDURE — 85018 HEMOGLOBIN: CPT | Performed by: SURGERY

## 2019-08-04 PROCEDURE — 99024 POSTOP FOLLOW-UP VISIT: CPT | Performed by: OBSTETRICS & GYNECOLOGY

## 2019-08-04 RX ADMIN — FAMOTIDINE 20 MG: 20 TABLET, FILM COATED ORAL at 08:05

## 2019-08-04 RX ADMIN — SIMETHICONE CHEW TAB 80 MG 80 MG: 80 TABLET ORAL at 12:29

## 2019-08-04 RX ADMIN — SIMETHICONE CHEW TAB 80 MG 80 MG: 80 TABLET ORAL at 17:50

## 2019-08-04 RX ADMIN — SIMETHICONE CHEW TAB 80 MG 80 MG: 80 TABLET ORAL at 22:02

## 2019-08-04 RX ADMIN — ACETAMINOPHEN AND CODEINE PHOSPHATE 1 TABLET: 300; 30 TABLET ORAL at 08:30

## 2019-08-04 RX ADMIN — IBUPROFEN 600 MG: 600 TABLET ORAL at 03:44

## 2019-08-04 RX ADMIN — IBUPROFEN 600 MG: 600 TABLET ORAL at 09:40

## 2019-08-04 RX ADMIN — ACETAMINOPHEN AND CODEINE PHOSPHATE 1 TABLET: 300; 30 TABLET ORAL at 04:39

## 2019-08-04 RX ADMIN — IBUPROFEN 600 MG: 600 TABLET ORAL at 22:02

## 2019-08-04 RX ADMIN — SIMETHICONE CHEW TAB 80 MG 80 MG: 80 TABLET ORAL at 08:05

## 2019-08-04 RX ADMIN — SODIUM CHLORIDE, POTASSIUM CHLORIDE, SODIUM LACTATE AND CALCIUM CHLORIDE 100 ML/HR: 600; 310; 30; 20 INJECTION, SOLUTION INTRAVENOUS at 06:25

## 2019-08-04 RX ADMIN — DOCUSATE SODIUM 100 MG: 100 CAPSULE, LIQUID FILLED ORAL at 08:05

## 2019-08-04 RX ADMIN — DOCUSATE SODIUM 100 MG: 100 CAPSULE, LIQUID FILLED ORAL at 22:05

## 2019-08-04 RX ADMIN — FAMOTIDINE 20 MG: 20 TABLET, FILM COATED ORAL at 17:50

## 2019-08-04 RX ADMIN — IBUPROFEN 600 MG: 600 TABLET ORAL at 15:52

## 2019-08-04 NOTE — ANESTHESIA POSTPROCEDURE EVALUATION
Patient: Clementina Hernandez    Procedure Summary     Date:  08/03/19 Room / Location:  [facility] OR 09 / [facility] MAIN OR    Anesthesia Start:  1310 Anesthesia Stop:  1512    Procedure:  DIAGNOSTIC LAPAROSCOPY (N/A Abdomen) Diagnosis:       Anemia due to acute blood loss      (Anemia due to acute blood loss [D62])    Surgeon:  Travis Trammell Jr., MD Provider:  Yobani Krishnan MD    Anesthesia Type:  general ASA Status:  2 - Emergent          Anesthesia Type: general  Last vitals  BP   121/79 (08/04/19 0204)   Temp   36.9 °C (98.4 °F) (08/04/19 0204)   Pulse   81 (08/04/19 0204)   Resp   16 (08/04/19 0204)     SpO2   92 % (08/04/19 0204)     Post Anesthesia Care and Evaluation      Comments: Patient discharged before being evaluated by an Anesthesiologist. No apparent complications per the record.  This case was not medically directed by me. I am completing this chart for medical records purposes; I personally have no medical involvement with this patient.

## 2019-08-05 VITALS
HEART RATE: 62 BPM | DIASTOLIC BLOOD PRESSURE: 85 MMHG | HEIGHT: 67 IN | SYSTOLIC BLOOD PRESSURE: 118 MMHG | WEIGHT: 220.39 LBS | TEMPERATURE: 98.5 F | BODY MASS INDEX: 34.59 KG/M2 | RESPIRATION RATE: 16 BRPM | OXYGEN SATURATION: 97 %

## 2019-08-05 LAB
BASOPHILS # BLD AUTO: 0.04 10*3/MM3 (ref 0–0.2)
BASOPHILS NFR BLD AUTO: 0.6 % (ref 0–1.5)
DEPRECATED RDW RBC AUTO: 49.3 FL (ref 37–54)
EOSINOPHIL # BLD AUTO: 0.09 10*3/MM3 (ref 0–0.4)
EOSINOPHIL NFR BLD AUTO: 1.4 % (ref 0.3–6.2)
ERYTHROCYTE [DISTWIDTH] IN BLOOD BY AUTOMATED COUNT: 13.8 % (ref 12.3–15.4)
HCT VFR BLD AUTO: 32 % (ref 34–46.6)
HGB BLD-MCNC: 9.5 G/DL (ref 12–15.9)
IMM GRANULOCYTES # BLD AUTO: 0.04 10*3/MM3 (ref 0–0.05)
IMM GRANULOCYTES NFR BLD AUTO: 0.6 % (ref 0–0.5)
LYMPHOCYTES # BLD AUTO: 2.45 10*3/MM3 (ref 0.7–3.1)
LYMPHOCYTES NFR BLD AUTO: 37.2 % (ref 19.6–45.3)
MCH RBC QN AUTO: 29.3 PG (ref 26.6–33)
MCHC RBC AUTO-ENTMCNC: 29.7 G/DL (ref 31.5–35.7)
MCV RBC AUTO: 98.8 FL (ref 79–97)
MONOCYTES # BLD AUTO: 0.29 10*3/MM3 (ref 0.1–0.9)
MONOCYTES NFR BLD AUTO: 4.4 % (ref 5–12)
NEUTROPHILS # BLD AUTO: 3.67 10*3/MM3 (ref 1.7–7)
NEUTROPHILS NFR BLD AUTO: 55.8 % (ref 42.7–76)
NRBC BLD AUTO-RTO: 0 /100 WBC (ref 0–0.2)
PLATELET # BLD AUTO: 192 10*3/MM3 (ref 140–450)
PMV BLD AUTO: 10.8 FL (ref 6–12)
RBC # BLD AUTO: 3.24 10*6/MM3 (ref 3.77–5.28)
WBC NRBC COR # BLD: 6.58 10*3/MM3 (ref 3.4–10.8)

## 2019-08-05 PROCEDURE — 99024 POSTOP FOLLOW-UP VISIT: CPT | Performed by: OBSTETRICS & GYNECOLOGY

## 2019-08-05 PROCEDURE — 85025 COMPLETE CBC W/AUTO DIFF WBC: CPT | Performed by: OBSTETRICS & GYNECOLOGY

## 2019-08-05 RX ADMIN — SIMETHICONE CHEW TAB 80 MG 80 MG: 80 TABLET ORAL at 08:10

## 2019-08-05 RX ADMIN — DOCUSATE SODIUM 100 MG: 100 CAPSULE, LIQUID FILLED ORAL at 08:10

## 2019-08-05 RX ADMIN — IBUPROFEN 600 MG: 600 TABLET ORAL at 05:04

## 2019-08-05 RX ADMIN — IBUPROFEN 600 MG: 600 TABLET ORAL at 11:01

## 2019-08-05 RX ADMIN — FAMOTIDINE 20 MG: 20 TABLET, FILM COATED ORAL at 06:37

## 2019-08-06 ENCOUNTER — READMISSION MANAGEMENT (OUTPATIENT)
Dept: CALL CENTER | Facility: HOSPITAL | Age: 39
End: 2019-08-06

## 2019-08-06 ENCOUNTER — TELEPHONE (OUTPATIENT)
Dept: OBSTETRICS AND GYNECOLOGY | Age: 39
End: 2019-08-06

## 2019-08-06 NOTE — OUTREACH NOTE
Prep Survey      Responses   Facility patient discharged from?  [place]   Is patient eligible?  No   What are the reasons patient is not eligible?  -- [Vaginal hysterectomy]   Does the patient have one of the following disease processes/diagnoses(primary or secondary)?  General Surgery   Prep survey completed?  Yes          Kenya Siddiqui RN

## 2019-08-07 ENCOUNTER — TELEPHONE (OUTPATIENT)
Dept: OBSTETRICS AND GYNECOLOGY | Age: 39
End: 2019-08-07

## 2019-08-07 ENCOUNTER — TELEPHONE (OUTPATIENT)
Dept: SURGERY | Facility: CLINIC | Age: 39
End: 2019-08-07

## 2019-08-07 RX ORDER — DICYCLOMINE HYDROCHLORIDE 10 MG/1
10 CAPSULE ORAL 4 TIMES DAILY
Qty: 120 CAPSULE | Refills: 0 | Status: SHIPPED | OUTPATIENT
Start: 2019-08-07 | End: 2019-08-26

## 2019-08-07 NOTE — TELEPHONE ENCOUNTER
not'd pt if pain or fever go to ER, pt states she did not come home with any pain meds, did not want percocet because it made her feel so out of it, requested and called in per Dr CAMERON Zaman#3 1 q 4hr prn #20 with no refill. Spoke with Catalino at pharm.

## 2019-08-07 NOTE — TELEPHONE ENCOUNTER
Tell patients I would go ahead and take a couple of pain pills and watch for the next 12 to 24 hours.  If he gets worse tonight or she runs a fever she needs to go back to the emergency room.  Otherwise please call tomorrow and will follow up.  I will be glad to check a urine if she wants to drop that by either today or tomorrow.

## 2019-08-07 NOTE — TELEPHONE ENCOUNTER
Lm for pt to call and let us know how she is doing and to possible get her scheduled to be seen today

## 2019-08-07 NOTE — TELEPHONE ENCOUNTER
Pt called, stated having some bowel and bladder issues on her message. Attempted to call her back but unable to reach her. Left message and will call her back    Bety Rendon MD  8/6/2019  10:05 PM

## 2019-08-07 NOTE — TELEPHONE ENCOUNTER
Pt called back stating she is having trouble with b.m and urinating, the stool is not hard not taken pain meds and goes multiple times a day (took extra str tyl Monday) q x she has to have b.m pt states she exp terrible pain from under belly button down, starts sweating profusely, was exp burning w/urniation drank cranberry juice which has helped. Please advise

## 2019-08-08 NOTE — TELEPHONE ENCOUNTER
The patient called on the telephone because she has been having severe lower abdominal cramps with bowel movements.  She states that she has had several bowel movements a day that are generally loose.  About 5 minutes before the bowel movement occurs she will start getting cramps and go to the bathroom.  She has severe cramps and pain as she has a bowel movement and then experiences almost immediate relief once the bowel movement is over.  Between bowel movements she has very little pain and does not need any pain medication.  She has been eating a liquid diet and her appetite is not great.  Overall she is feeling better except for the crampy abdominal pain.    A prescription for Bentyl was sent to her pharmacy to try to improve the cramps.  She will contact our office if she does not have improvement.

## 2019-08-08 NOTE — TELEPHONE ENCOUNTER
Checked on pt who spoke w/ Dr Trammell who called in Rx to help intestines, recomm pt to start that med and continue with tyl#3. Scheduled appt with Dr BRADY Monday 8-12-19

## 2019-08-11 ENCOUNTER — APPOINTMENT (OUTPATIENT)
Dept: GENERAL RADIOLOGY | Facility: HOSPITAL | Age: 39
End: 2019-08-11

## 2019-08-11 ENCOUNTER — APPOINTMENT (OUTPATIENT)
Dept: CT IMAGING | Facility: HOSPITAL | Age: 39
End: 2019-08-11

## 2019-08-11 ENCOUNTER — HOSPITAL ENCOUNTER (INPATIENT)
Facility: HOSPITAL | Age: 39
LOS: 2 days | Discharge: HOME | End: 2019-08-13
Attending: EMERGENCY MEDICINE | Admitting: HOSPITALIST

## 2019-08-11 DIAGNOSIS — D72.829 LEUKOCYTOSIS, UNSPECIFIED TYPE: ICD-10-CM

## 2019-08-11 DIAGNOSIS — T14.8XXA HEMATOMA: ICD-10-CM

## 2019-08-11 DIAGNOSIS — Z98.890 HISTORY OF EVACUATION OF HEMATOMA: ICD-10-CM

## 2019-08-11 DIAGNOSIS — R10.9 ABDOMINAL PAIN, UNSPECIFIED ABDOMINAL LOCATION: Primary | ICD-10-CM

## 2019-08-11 DIAGNOSIS — Z90.710 HISTORY OF VAGINAL HYSTERECTOMY: ICD-10-CM

## 2019-08-11 LAB
ALBUMIN SERPL-MCNC: 4 G/DL (ref 3.5–5.2)
ALBUMIN/GLOB SERPL: 1.5 G/DL
ALP SERPL-CCNC: 65 U/L (ref 39–117)
ALT SERPL W P-5'-P-CCNC: 17 U/L (ref 1–33)
ANION GAP SERPL CALCULATED.3IONS-SCNC: 13.3 MMOL/L (ref 5–15)
AST SERPL-CCNC: 12 U/L (ref 1–32)
BACTERIA UR QL AUTO: ABNORMAL /HPF
BASOPHILS # BLD AUTO: 0.11 10*3/MM3 (ref 0–0.2)
BASOPHILS NFR BLD AUTO: 0.7 % (ref 0–1.5)
BILIRUB SERPL-MCNC: 0.5 MG/DL (ref 0.2–1.2)
BILIRUB UR QL STRIP: NEGATIVE
BUN BLD-MCNC: 9 MG/DL (ref 6–20)
BUN/CREAT SERPL: 16.7 (ref 7–25)
CALCIUM SPEC-SCNC: 8.9 MG/DL (ref 8.6–10.5)
CHLORIDE SERPL-SCNC: 98 MMOL/L (ref 98–107)
CLARITY UR: CLEAR
CO2 SERPL-SCNC: 23.7 MMOL/L (ref 22–29)
COLOR UR: YELLOW
CREAT BLD-MCNC: 0.54 MG/DL (ref 0.57–1)
DEPRECATED RDW RBC AUTO: 49.2 FL (ref 37–54)
EOSINOPHIL # BLD AUTO: 0.18 10*3/MM3 (ref 0–0.4)
EOSINOPHIL NFR BLD AUTO: 1.2 % (ref 0.3–6.2)
ERYTHROCYTE [DISTWIDTH] IN BLOOD BY AUTOMATED COUNT: 14 % (ref 12.3–15.4)
GFR SERPL CREATININE-BSD FRML MDRD: 127 ML/MIN/1.73
GLOBULIN UR ELPH-MCNC: 2.6 GM/DL
GLUCOSE BLD-MCNC: 84 MG/DL (ref 65–99)
GLUCOSE UR STRIP-MCNC: NEGATIVE MG/DL
HCT VFR BLD AUTO: 41.3 % (ref 34–46.6)
HGB BLD-MCNC: 12.4 G/DL (ref 12–15.9)
HGB UR QL STRIP.AUTO: ABNORMAL
HYALINE CASTS UR QL AUTO: ABNORMAL /LPF
IMM GRANULOCYTES # BLD AUTO: 0.21 10*3/MM3 (ref 0–0.05)
IMM GRANULOCYTES NFR BLD AUTO: 1.3 % (ref 0–0.5)
KETONES UR QL STRIP: NEGATIVE
LEUKOCYTE ESTERASE UR QL STRIP.AUTO: ABNORMAL
LIPASE SERPL-CCNC: 19 U/L (ref 13–60)
LYMPHOCYTES # BLD AUTO: 2.52 10*3/MM3 (ref 0.7–3.1)
LYMPHOCYTES NFR BLD AUTO: 16.2 % (ref 19.6–45.3)
MAGNESIUM SERPL-MCNC: 2 MG/DL (ref 1.6–2.6)
MCH RBC QN AUTO: 29 PG (ref 26.6–33)
MCHC RBC AUTO-ENTMCNC: 30 G/DL (ref 31.5–35.7)
MCV RBC AUTO: 96.5 FL (ref 79–97)
MONOCYTES # BLD AUTO: 1.14 10*3/MM3 (ref 0.1–0.9)
MONOCYTES NFR BLD AUTO: 7.3 % (ref 5–12)
NEUTROPHILS # BLD AUTO: 11.43 10*3/MM3 (ref 1.7–7)
NEUTROPHILS NFR BLD AUTO: 73.3 % (ref 42.7–76)
NITRITE UR QL STRIP: NEGATIVE
NRBC BLD AUTO-RTO: 0.1 /100 WBC (ref 0–0.2)
PH UR STRIP.AUTO: 6.5 [PH] (ref 5–8)
PLATELET # BLD AUTO: 249 10*3/MM3 (ref 140–450)
PMV BLD AUTO: 10.3 FL (ref 6–12)
POTASSIUM BLD-SCNC: 3.8 MMOL/L (ref 3.5–5.2)
PROT SERPL-MCNC: 6.6 G/DL (ref 6–8.5)
PROT UR QL STRIP: NEGATIVE
RBC # BLD AUTO: 4.28 10*6/MM3 (ref 3.77–5.28)
RBC # UR: ABNORMAL /HPF
REF LAB TEST METHOD: ABNORMAL
SODIUM BLD-SCNC: 135 MMOL/L (ref 136–145)
SP GR UR STRIP: 1.01 (ref 1–1.03)
SQUAMOUS #/AREA URNS HPF: ABNORMAL /HPF
UROBILINOGEN UR QL STRIP: ABNORMAL
WBC NRBC COR # BLD: 15.59 10*3/MM3 (ref 3.4–10.8)
WBC UR QL AUTO: ABNORMAL /HPF

## 2019-08-11 PROCEDURE — 25010000002 VANCOMYCIN 10 G RECONSTITUTED SOLUTION: Performed by: EMERGENCY MEDICINE

## 2019-08-11 PROCEDURE — 81001 URINALYSIS AUTO W/SCOPE: CPT | Performed by: EMERGENCY MEDICINE

## 2019-08-11 PROCEDURE — 71275 CT ANGIOGRAPHY CHEST: CPT

## 2019-08-11 PROCEDURE — 85025 COMPLETE CBC W/AUTO DIFF WBC: CPT | Performed by: EMERGENCY MEDICINE

## 2019-08-11 PROCEDURE — 25010000002 IOPAMIDOL 61 % SOLUTION: Performed by: EMERGENCY MEDICINE

## 2019-08-11 PROCEDURE — 74177 CT ABD & PELVIS W/CONTRAST: CPT

## 2019-08-11 PROCEDURE — 71045 X-RAY EXAM CHEST 1 VIEW: CPT

## 2019-08-11 PROCEDURE — 87040 BLOOD CULTURE FOR BACTERIA: CPT | Performed by: EMERGENCY MEDICINE

## 2019-08-11 PROCEDURE — 0 IOPAMIDOL PER 1 ML: Performed by: HOSPITALIST

## 2019-08-11 PROCEDURE — 83690 ASSAY OF LIPASE: CPT | Performed by: EMERGENCY MEDICINE

## 2019-08-11 PROCEDURE — 25010000002 PIPERACILLIN SOD-TAZOBACTAM PER 1 G: Performed by: SURGERY

## 2019-08-11 PROCEDURE — 99253 IP/OBS CNSLTJ NEW/EST LOW 45: CPT | Performed by: OBSTETRICS & GYNECOLOGY

## 2019-08-11 PROCEDURE — 80053 COMPREHEN METABOLIC PANEL: CPT | Performed by: EMERGENCY MEDICINE

## 2019-08-11 PROCEDURE — 99285 EMERGENCY DEPT VISIT HI MDM: CPT

## 2019-08-11 PROCEDURE — 25010000002 PIPERACILLIN SOD-TAZOBACTAM PER 1 G: Performed by: EMERGENCY MEDICINE

## 2019-08-11 PROCEDURE — 83735 ASSAY OF MAGNESIUM: CPT | Performed by: EMERGENCY MEDICINE

## 2019-08-11 RX ORDER — ACETAMINOPHEN AND CODEINE PHOSPHATE 300; 30 MG/1; MG/1
2 TABLET ORAL EVERY 6 HOURS PRN
Status: DISCONTINUED | OUTPATIENT
Start: 2019-08-11 | End: 2019-08-13 | Stop reason: HOSPADM

## 2019-08-11 RX ORDER — DOCUSATE SODIUM 100 MG/1
100 CAPSULE, LIQUID FILLED ORAL 3 TIMES DAILY
Status: DISCONTINUED | OUTPATIENT
Start: 2019-08-11 | End: 2019-08-13

## 2019-08-11 RX ORDER — HYDROMORPHONE HYDROCHLORIDE 1 MG/ML
0.5 INJECTION, SOLUTION INTRAMUSCULAR; INTRAVENOUS; SUBCUTANEOUS EVERY 4 HOURS PRN
Status: DISCONTINUED | OUTPATIENT
Start: 2019-08-11 | End: 2019-08-13

## 2019-08-11 RX ORDER — ACETAMINOPHEN 500 MG
1000 TABLET ORAL ONCE
Status: COMPLETED | OUTPATIENT
Start: 2019-08-11 | End: 2019-08-11

## 2019-08-11 RX ORDER — DOCUSATE SODIUM 100 MG/1
100 CAPSULE, LIQUID FILLED ORAL DAILY PRN
COMMUNITY

## 2019-08-11 RX ORDER — PANTOPRAZOLE SODIUM 40 MG/1
40 TABLET, DELAYED RELEASE ORAL
Status: DISCONTINUED | OUTPATIENT
Start: 2019-08-12 | End: 2019-08-13 | Stop reason: HOSPADM

## 2019-08-11 RX ORDER — ONDANSETRON 2 MG/ML
4 INJECTION INTRAMUSCULAR; INTRAVENOUS EVERY 4 HOURS PRN
Status: DISCONTINUED | OUTPATIENT
Start: 2019-08-11 | End: 2019-08-13 | Stop reason: HOSPADM

## 2019-08-11 RX ORDER — SODIUM CHLORIDE, SODIUM LACTATE, POTASSIUM CHLORIDE, CALCIUM CHLORIDE 600; 310; 30; 20 MG/100ML; MG/100ML; MG/100ML; MG/100ML
75 INJECTION, SOLUTION INTRAVENOUS CONTINUOUS
Status: DISCONTINUED | OUTPATIENT
Start: 2019-08-11 | End: 2019-08-13

## 2019-08-11 RX ADMIN — IOPAMIDOL 85 ML: 612 INJECTION, SOLUTION INTRAVENOUS at 15:00

## 2019-08-11 RX ADMIN — SODIUM CHLORIDE 1000 ML: 9 INJECTION, SOLUTION INTRAVENOUS at 13:45

## 2019-08-11 RX ADMIN — VANCOMYCIN HYDROCHLORIDE 2000 MG: 10 INJECTION, POWDER, LYOPHILIZED, FOR SOLUTION INTRAVENOUS at 18:01

## 2019-08-11 RX ADMIN — IOPAMIDOL 95 ML: 755 INJECTION, SOLUTION INTRAVENOUS at 22:22

## 2019-08-11 RX ADMIN — TAZOBACTAM SODIUM AND PIPERACILLIN SODIUM 3.38 G: 375; 3 INJECTION, SOLUTION INTRAVENOUS at 16:50

## 2019-08-11 RX ADMIN — TAZOBACTAM SODIUM AND PIPERACILLIN SODIUM 3.38 G: 375; 3 INJECTION, SOLUTION INTRAVENOUS at 23:20

## 2019-08-11 RX ADMIN — ACETAMINOPHEN AND CODEINE PHOSPHATE 2 TABLET: 300; 30 TABLET ORAL at 23:20

## 2019-08-11 RX ADMIN — DOCUSATE SODIUM 100 MG: 100 CAPSULE, LIQUID FILLED ORAL at 21:18

## 2019-08-11 RX ADMIN — SODIUM CHLORIDE, POTASSIUM CHLORIDE, SODIUM LACTATE AND CALCIUM CHLORIDE 150 ML/HR: 600; 310; 30; 20 INJECTION, SOLUTION INTRAVENOUS at 23:19

## 2019-08-11 RX ADMIN — ACETAMINOPHEN 1000 MG: 500 TABLET, FILM COATED ORAL at 13:55

## 2019-08-11 NOTE — ED TRIAGE NOTES
Pt reports abd pain rt side and rt shoulder pain pt states had Hysterectomy 7/31/19 had Laparoscopy by Dr. Trammell on 8/3/19.

## 2019-08-11 NOTE — ED PROVIDER NOTES
" EMERGENCY DEPARTMENT ENCOUNTER    CHIEF COMPLAINT  Chief Complaint: Abdominal pain  History given by: Patient  History limited by: Nothing  Room Number: 34/34  PMD: Issac Chavez MD      HPI:  Pt is a 39 y.o. female who presents complaining of RUQ abdominal pain that began 4 days ago. Pt is also complaining of R sided chest pain, diffuse lower abdominal pain that is worse after bowel movements, \"razor sharp\" vaginal pain, SOA, and dysuria. Pt denies nausea, vomiting, diarrhea and cough. Pt reports she had a hysterectomy on 7/31/19 by  and then had a laparoscopy to stop internal bleeding and remove a hematoma from the hysterectomy on 8/3/19 by . Pt reports she received 4 units of blood after the laparoscopy. Pt reports she was having diarrhea after the surgery but  called her in an antibiotic one week ago and she is now having solid bowel movements.     Duration:  4 days  Onset: Gradual  Timing: Constant  Location: RUQ  Radiation: None  Quality: Pain  Intensity/Severity: Moderate  Progression: Unchanged  Associated Symptoms: R sided chest pain, diffuse lower abdominal pain that is worse after bowel movements, \"razor sharp\" vaginal pain, SOA, and dysuria  Aggravating Factors: None  Alleviating Factors: None  Previous Episodes: None  Treatment before arrival: None    PAST MEDICAL HISTORY  Active Ambulatory Problems     Diagnosis Date Noted   • Breast injury 12/15/2016   • Chest pain 05/09/2016   • Accumulation of fluid in tissues 12/15/2016   • Fatigue 12/15/2016   • Family history of coronary artery disease 05/09/2016   • Personal history of other specified conditions 05/09/2016   • Benign hypertension 05/09/2016   • Hyperlipidemia 05/09/2016   • Adult body mass index greater than 30 05/09/2016   • Adult hypothyroidism 12/15/2016   • Schwannoma of spinal cord (CMS/HCC) 12/15/2016   • Neurilemmoma 05/09/2016   • Staph skin infection 12/15/2016   • Subclinical hypothyroidism 05/09/2016   • " Avitaminosis D 12/15/2016   • Uterine prolapse 2018   • Pelvic relaxation due to cystocele, midline 2018   • Pelvic relaxation due to rectocele 2018   • Menorrhagia with regular cycle 2019   • Cystocele with uterine prolapse 2019   • Enterocele 2019   • Anemia due to acute blood loss 2019   • Anemia 2019     Resolved Ambulatory Problems     Diagnosis Date Noted   • Abnormal finding on thyroid function test 12/15/2016   • HLD (hyperlipidemia) 12/15/2016   • 38 weeks gestation of pregnancy 2017   • Pregnancy 2017   • Encounter for supervision of other normal pregnancy, second trimester 2017   • LGA (large for gestational age) fetus affecting management of mother 2017   • Pregnancy 2017   • Rubella non-immune status, antepartum 2017     Past Medical History:   Diagnosis Date   • History of benign schwannoma    • History of Papanicolaou smear of cervix    • LGA (large for gestational age) fetus affecting management of mother 2017   • Posterior vaginal wall prolapse    •  labor in third trimester    • Rubella non-immune status, antepartum 2017   • Vaginal delivery        PAST SURGICAL HISTORY  Past Surgical History:   Procedure Laterality Date   • ANTERIOR AND POSTERIOR VAGINAL REPAIR N/A 2019    Procedure: ANTERIOR AND POSTERIOR VAGINAL REPAIR;  Surgeon: Dru Pena MD;  Location: [facility] OR [facility];  Service: Obstetrics/Gynecology   • DENTAL PROCEDURE      IMPLANT   • DIAGNOSTIC LAPAROSCOPY N/A 8/3/2019    Procedure: DIAGNOSTIC LAPAROSCOPY;  Surgeon: Travis Trammell Jr., MD;  Location: [facility] OR;  Service: General   • VAGINAL HYSTERECTOMY N/A 2019    Procedure: VAGINAL HYSTERECTOMY;  Surgeon: Dru Pena MD;  Location: [facility] OR [facility];  Service: Obstetrics/Gynecology   • WISDOM TOOTH EXTRACTION         FAMILY HISTORY  Family History   Problem Relation Age of Onset   • No Known Problems Daughter   "  • No Known Problems Daughter    • No Known Problems Daughter    • No Known Problems Son    • Coronary artery disease Mother    • Malig Hyperthermia Neg Hx        SOCIAL HISTORY  Social History     Socioeconomic History   • Marital status:      Spouse name: EFRAIN    • Number of children: 5   • Years of education: MASTERS   • Highest education level: Professional school degree (e.g., MD, DDS, DVM, TASHIA)   Occupational History     Comment:     Tobacco Use   • Smoking status: Never Smoker   • Smokeless tobacco: Never Used   Substance and Sexual Activity   • Alcohol use: No   • Drug use: No       ALLERGIES  Patient has no known allergies.    REVIEW OF SYSTEMS  Review of Systems   Constitutional: Negative for fever.   HENT: Negative for sore throat.    Eyes: Negative.    Respiratory: Positive for shortness of breath. Negative for cough.    Cardiovascular: Positive for chest pain (R sided).   Gastrointestinal: Positive for abdominal pain (RUQ and diffuse lower). Negative for diarrhea, nausea and vomiting.   Genitourinary: Positive for dysuria and vaginal pain (\"razor\" sharp).   Musculoskeletal: Negative for neck pain.   Skin: Negative for rash.   Allergic/Immunologic: Negative.    Neurological: Negative for weakness, numbness and headaches.   Hematological: Negative.    Psychiatric/Behavioral: Negative.    All other systems reviewed and are negative.      PHYSICAL EXAM  ED Triage Vitals [08/11/19 1159]   Temp Heart Rate Resp BP SpO2   99.1 °F (37.3 °C) 110 18 -- 98 %      Temp src Heart Rate Source Patient Position BP Location FiO2 (%)   Tympanic Monitor -- -- --       Physical Exam   Constitutional: She is oriented to person, place, and time and well-developed, well-nourished, and in no distress. No distress.   HENT:   Head: Normocephalic.   Mouth/Throat: Mucous membranes are normal.   Eyes: EOM are normal.   Neck: Normal range of motion.   Cardiovascular: Normal rate and regular rhythm. Exam reveals no " gallop and no friction rub.   No murmur heard.  Reproducible R anterior chest wall pain to palpation    Pulmonary/Chest: Effort normal. No respiratory distress. She has no wheezes. She has no rhonchi. She has no rales.   Abdominal: Soft. She exhibits no distension. Bowel sounds are absent. There is tenderness (RUQ and diffuse mild lower). There is no guarding.   Musculoskeletal: Normal range of motion. She exhibits no deformity.   Neurological: She is alert and oriented to person, place, and time. GCS score is 15.   moving all extremities, no focal deficits   Skin: Skin is warm and dry.   Psychiatric:   Calm, cooperative   Nursing note and vitals reviewed.      LAB RESULTS  Lab Results (last 24 hours)     Procedure Component Value Units Date/Time    Urinalysis With Microscopic If Indicated (No Culture) - Urine, Clean Catch [[id number]]  (Abnormal) Collected:  08/11/19 1212    Specimen:  Urine, Clean Catch Updated:  08/11/19 1237     Color, UA Yellow     Appearance, UA Clear     pH, UA 6.5     Specific Gravity, UA 1.006     Glucose, UA Negative     Ketones, UA Negative     Bilirubin, UA Negative     Blood, UA Small (1+)     Protein, UA Negative     Leuk Esterase, UA Large (3+)     Nitrite, UA Negative     Urobilinogen, UA 0.2 E.U./dL    Urinalysis, Microscopic Only - Urine, Clean Catch [[id number]]  (Abnormal) Collected:  08/11/19 1212    Specimen:  Urine, Clean Catch Updated:  08/11/19 1313     RBC, UA 3-5 /HPF      WBC, UA 13-20 /HPF      Bacteria, UA None Seen /HPF      Squamous Epithelial Cells, UA 7-12 /HPF      Hyaline Casts, UA None Seen /LPF      Methodology Manual Light Microscopy    CBC & Differential [[id number]] Collected:  08/11/19 1223    Specimen:  Blood Updated:  08/11/19 1236    Narrative:       The following orders were created for panel order CBC & Differential.  Procedure                               Abnormality         Status                     ---------                               -----------          ------                     CBC Auto Differential[[id number]]        Abnormal            Final result                 Please view results for these tests on the individual orders.    CBC Auto Differential [[id number]]  (Abnormal) Collected:  08/11/19 1223    Specimen:  Blood Updated:  08/11/19 1236     WBC 15.59 10*3/mm3      RBC 4.28 10*6/mm3      Hemoglobin 12.4 g/dL      Hematocrit 41.3 %      MCV 96.5 fL      MCH 29.0 pg      MCHC 30.0 g/dL      RDW 14.0 %      RDW-SD 49.2 fl      MPV 10.3 fL      Platelets 249 10*3/mm3      Neutrophil % 73.3 %      Lymphocyte % 16.2 %      Monocyte % 7.3 %      Eosinophil % 1.2 %      Basophil % 0.7 %      Immature Grans % 1.3 %      Neutrophils, Absolute 11.43 10*3/mm3      Lymphocytes, Absolute 2.52 10*3/mm3      Monocytes, Absolute 1.14 10*3/mm3      Eosinophils, Absolute 0.18 10*3/mm3      Basophils, Absolute 0.11 10*3/mm3      Immature Grans, Absolute 0.21 10*3/mm3      nRBC 0.1 /100 WBC     Comprehensive Metabolic Panel [[id number]]  (Abnormal) Collected:  08/11/19 1340    Specimen:  Blood Updated:  08/11/19 1415     Glucose 84 mg/dL      BUN 9 mg/dL      Creatinine 0.54 mg/dL      Sodium 135 mmol/L      Potassium 3.8 mmol/L      Chloride 98 mmol/L      CO2 23.7 mmol/L      Calcium 8.9 mg/dL      Total Protein 6.6 g/dL      Albumin 4.00 g/dL      ALT (SGPT) 17 U/L      AST (SGOT) 12 U/L      Alkaline Phosphatase 65 U/L      Total Bilirubin 0.5 mg/dL      eGFR Non African Amer 127 mL/min/1.73      Globulin 2.6 gm/dL      A/G Ratio 1.5 g/dL      BUN/Creatinine Ratio 16.7     Anion Gap 13.3 mmol/L     Narrative:       GFR Normal >60  Chronic Kidney Disease <60  Kidney Failure <15    Lipase [[id number]]  (Normal) Collected:  08/11/19 1340    Specimen:  Blood Updated:  08/11/19 1411     Lipase 19 U/L     Magnesium [[id number]]  (Normal) Collected:  08/11/19 1340    Specimen:  Blood Updated:  08/11/19 1411     Magnesium 2.0 mg/dL     Blood Culture - Blood, Arm, Left [[id number]]  Collected:  08/11/19 1637    Specimen:  Blood from Arm, Left Updated:  08/11/19 1702    Blood Culture - Blood, Hand, Right [[id number]] Collected:  08/11/19 1649    Specimen:  Blood from Hand, Right Updated:  08/11/19 1702          I ordered the above labs and reviewed the results    RADIOLOGY  XR Chest 1 View   FINDINGS: The lungs are well-expanded and clear and the heart and hilar  structures are normal. There is no acute disease or change from  07/31/2019.         CT Abdomen Pelvis With Contrast    (Results Pending)        I ordered the above noted radiological studies. Interpreted by radiologist. Discussed with radiologist (). Reviewed by me in PACS.       PROCEDURES  Procedures      PROGRESS AND CONSULTS   1214: Checked pt who is resting comfortably and in NAD. Discussed plan to obtain a CT A/P, chest XR and blood work for further evaluation.     1558: Spoke with  (radiology) who stated appearance of CT compared to CT on 8/2/19 showed slight volume reduction with no definitive walled off abscess.    1559: Rechecked pt who is resting comfortably and in NAD. Pt reports razor sharp pain in vagina is new today but abdominal pain and chest pain are similar to pain that pt felt before hematoma was evacuated. Informed pt of negative chest XR and low suspicion of cardiac etiology. Discussed plan to consult surgery. Pt understands and agrees with the plan, all questions answered.    1619: Spoke with  (surgery) who states she would like pt started on abx and admitted     1634: Spoke with  ([facility]) who agreed to admit     1649: Rechecked pt who is resting comfortably and in NAD. Informed pt of conversation with  (surgery) and plan to admit. Pt understands and agrees with the plan, all questions answered.      MEDICAL DECISION MAKING  Results were reviewed/discussed with the patient and they were also made aware of online access. Pt also made aware that some labs, such as  cultures, will not be resulted during ER visit and follow up with PMD is necessary.     MDM  Number of Diagnoses or Management Options  Abdominal pain, unspecified abdominal location:   History of evacuation of hematoma:   History of vaginal hysterectomy:   Leukocytosis, unspecified type:   Diagnosis management comments: Patient presented today for evaluation of abdominal pains, concerned that she may be having persistent complications from recent surgeries.  CT scan shows intra-abdominal and pelvic fluid that is only slightly improved from preop CT scan obtained on August 2.  No definitive localized abscess, no acute other inflammatory process noted or obstructive process noted in the abdomen or pelvis.  Labs do show a new leukocytosis of 15,000 which is elevated again, with preop levels around 16,000 had decreased afterwards.  I discussed the case with Dr. De Anda with general surgery, and she is concerned for possible infected hematoma, and recommends hospitalization for IV antibiotics and further surgical evaluation.  I did discuss the case with Dr. Chan with [facility] who will hospitalize the patient today.  Patient has been updated on the course and plan and findings and the  need for hospital stay for IV antibiotics and further surgical evaluation and potential management.       Amount and/or Complexity of Data Reviewed  Clinical lab tests: ordered and reviewed (Hemoglobin 12.4, WBC 15.59)  Tests in the radiology section of CPT®: ordered and reviewed (XR chest negative acute, CT A/P showed showed slight volume reduction with no definitive walled off abscess as compared to CT on 8/2/19)  Discussion of test results with the performing providers: yes ( (radiology))  Decide to obtain previous medical records or to obtain history from someone other than the patient: yes  Discuss the patient with other providers: yes ( (surgery)   ([facility]))  Independent visualization of images, tracings,  or specimens: yes    Patient Progress  Patient progress: stable         DIAGNOSIS  Final diagnoses:   Abdominal pain, unspecified abdominal location   Leukocytosis, unspecified type   History of evacuation of hematoma   History of vaginal hysterectomy       DISPOSITION  ADMISSION TO [facility]    Discussed treatment plan and reason for admission with pt/family and admitting physician.  Pt/family voiced understanding of the plan for admission for further testing/treatment as needed.         Latest Documented Vital Signs:  As of 6:01 PM  BP- 117/79 HR- 78 Temp- 99.1 °F (37.3 °C) (Tympanic) O2 sat- 98%    --  Documentation assistance provided by antwon Roberson for .  Information recorded by the scribe was done at my direction and has been verified and validated by me.                         Deyanira Roberson  08/11/19 2637       Sudhir Navas MD  08/11/19 5591

## 2019-08-12 ENCOUNTER — TELEPHONE (OUTPATIENT)
Dept: OBSTETRICS AND GYNECOLOGY | Age: 39
End: 2019-08-12

## 2019-08-12 ENCOUNTER — ANESTHESIA (OUTPATIENT)
Dept: PERIOP | Facility: HOSPITAL | Age: 39
End: 2019-08-12

## 2019-08-12 ENCOUNTER — ANESTHESIA EVENT (OUTPATIENT)
Dept: PERIOP | Facility: HOSPITAL | Age: 39
End: 2019-08-12

## 2019-08-12 LAB
ANION GAP SERPL CALCULATED.3IONS-SCNC: 12.2 MMOL/L (ref 5–15)
BASOPHILS # BLD AUTO: 0.04 10*3/MM3 (ref 0–0.2)
BASOPHILS NFR BLD AUTO: 0.4 % (ref 0–1.5)
BUN BLD-MCNC: 5 MG/DL (ref 6–20)
BUN/CREAT SERPL: 9.3 (ref 7–25)
CALCIUM SPEC-SCNC: 8.4 MG/DL (ref 8.6–10.5)
CHLORIDE SERPL-SCNC: 105 MMOL/L (ref 98–107)
CO2 SERPL-SCNC: 22.8 MMOL/L (ref 22–29)
CREAT BLD-MCNC: 0.54 MG/DL (ref 0.57–1)
DEPRECATED RDW RBC AUTO: 46 FL (ref 37–54)
EOSINOPHIL # BLD AUTO: 0.21 10*3/MM3 (ref 0–0.4)
EOSINOPHIL NFR BLD AUTO: 2 % (ref 0.3–6.2)
ERYTHROCYTE [DISTWIDTH] IN BLOOD BY AUTOMATED COUNT: 13.5 % (ref 12.3–15.4)
GFR SERPL CREATININE-BSD FRML MDRD: 127 ML/MIN/1.73
GLUCOSE BLD-MCNC: 88 MG/DL (ref 65–99)
GLUCOSE BLDC GLUCOMTR-MCNC: 112 MG/DL (ref 70–130)
HCT VFR BLD AUTO: 34.3 % (ref 34–46.6)
HGB BLD-MCNC: 10.7 G/DL (ref 12–15.9)
IMM GRANULOCYTES # BLD AUTO: 0.11 10*3/MM3 (ref 0–0.05)
IMM GRANULOCYTES NFR BLD AUTO: 1.1 % (ref 0–0.5)
LYMPHOCYTES # BLD AUTO: 1.58 10*3/MM3 (ref 0.7–3.1)
LYMPHOCYTES NFR BLD AUTO: 15.2 % (ref 19.6–45.3)
MCH RBC QN AUTO: 29.2 PG (ref 26.6–33)
MCHC RBC AUTO-ENTMCNC: 31.2 G/DL (ref 31.5–35.7)
MCV RBC AUTO: 93.5 FL (ref 79–97)
MONOCYTES # BLD AUTO: 0.74 10*3/MM3 (ref 0.1–0.9)
MONOCYTES NFR BLD AUTO: 7.1 % (ref 5–12)
NEUTROPHILS # BLD AUTO: 7.71 10*3/MM3 (ref 1.7–7)
NEUTROPHILS NFR BLD AUTO: 74.2 % (ref 42.7–76)
NRBC BLD AUTO-RTO: 0 /100 WBC (ref 0–0.2)
PLATELET # BLD AUTO: 269 10*3/MM3 (ref 140–450)
PMV BLD AUTO: 10.7 FL (ref 6–12)
POTASSIUM BLD-SCNC: 3.9 MMOL/L (ref 3.5–5.2)
RBC # BLD AUTO: 3.67 10*6/MM3 (ref 3.77–5.28)
SODIUM BLD-SCNC: 140 MMOL/L (ref 136–145)
WBC NRBC COR # BLD: 10.39 10*3/MM3 (ref 3.4–10.8)

## 2019-08-12 PROCEDURE — 25010000002 PIPERACILLIN SOD-TAZOBACTAM PER 1 G: Performed by: SURGERY

## 2019-08-12 PROCEDURE — 25010000002 PROPOFOL 10 MG/ML EMULSION: Performed by: NURSE ANESTHETIST, CERTIFIED REGISTERED

## 2019-08-12 PROCEDURE — 87205 SMEAR GRAM STAIN: CPT | Performed by: SURGERY

## 2019-08-12 PROCEDURE — 80048 BASIC METABOLIC PNL TOTAL CA: CPT | Performed by: HOSPITALIST

## 2019-08-12 PROCEDURE — 0W9H4ZZ DRAINAGE OF RETROPERITONEUM, PERCUTANEOUS ENDOSCOPIC APPROACH: ICD-10-PCS | Performed by: SURGERY

## 2019-08-12 PROCEDURE — 0DTJ4ZZ RESECTION OF APPENDIX, PERCUTANEOUS ENDOSCOPIC APPROACH: ICD-10-PCS | Performed by: SURGERY

## 2019-08-12 PROCEDURE — 87015 SPECIMEN INFECT AGNT CONCNTJ: CPT | Performed by: SURGERY

## 2019-08-12 PROCEDURE — 25010000002 HYDROMORPHONE PER 4 MG: Performed by: NURSE ANESTHETIST, CERTIFIED REGISTERED

## 2019-08-12 PROCEDURE — 44970 LAPAROSCOPY APPENDECTOMY: CPT | Performed by: SURGERY

## 2019-08-12 PROCEDURE — 87070 CULTURE OTHR SPECIMN AEROBIC: CPT | Performed by: SURGERY

## 2019-08-12 PROCEDURE — 25010000002 FENTANYL CITRATE (PF) 100 MCG/2ML SOLUTION: Performed by: NURSE ANESTHETIST, CERTIFIED REGISTERED

## 2019-08-12 PROCEDURE — 25010000002 FENTANYL CITRATE (PF) 100 MCG/2ML SOLUTION

## 2019-08-12 PROCEDURE — 25010000002 PIPERACILLIN SOD-TAZOBACTAM PER 1 G: Performed by: HOSPITALIST

## 2019-08-12 PROCEDURE — 88304 TISSUE EXAM BY PATHOLOGIST: CPT | Performed by: SURGERY

## 2019-08-12 PROCEDURE — 25010000002 NEOSTIGMINE PER 0.5 MG: Performed by: ANESTHESIOLOGY

## 2019-08-12 PROCEDURE — 25010000002 DEXAMETHASONE PER 1 MG: Performed by: NURSE ANESTHETIST, CERTIFIED REGISTERED

## 2019-08-12 PROCEDURE — 85025 COMPLETE CBC W/AUTO DIFF WBC: CPT | Performed by: SURGERY

## 2019-08-12 PROCEDURE — 82962 GLUCOSE BLOOD TEST: CPT

## 2019-08-12 PROCEDURE — 25010000002 ONDANSETRON PER 1 MG: Performed by: ANESTHESIOLOGY

## 2019-08-12 PROCEDURE — 0DNE4ZZ RELEASE LARGE INTESTINE, PERCUTANEOUS ENDOSCOPIC APPROACH: ICD-10-PCS | Performed by: SURGERY

## 2019-08-12 PROCEDURE — 88312 SPECIAL STAINS GROUP 1: CPT | Performed by: SURGERY

## 2019-08-12 PROCEDURE — 25010000002 MIDAZOLAM PER 1 MG: Performed by: ANESTHESIOLOGY

## 2019-08-12 PROCEDURE — 25010000002 HYDROMORPHONE PER 4 MG: Performed by: HOSPITALIST

## 2019-08-12 PROCEDURE — 0DBW4ZZ EXCISION OF PERITONEUM, PERCUTANEOUS ENDOSCOPIC APPROACH: ICD-10-PCS | Performed by: SURGERY

## 2019-08-12 DEVICE — ENDOPATH ETS45 2.5MM RELOADS (VASCULAR/THIN)
Type: IMPLANTABLE DEVICE | Site: ABDOMEN | Status: FUNCTIONAL
Brand: ENDOPATH

## 2019-08-12 RX ORDER — PROMETHAZINE HYDROCHLORIDE 25 MG/1
25 TABLET ORAL ONCE AS NEEDED
Status: DISCONTINUED | OUTPATIENT
Start: 2019-08-12 | End: 2019-08-12 | Stop reason: SDUPTHER

## 2019-08-12 RX ORDER — HYDROCODONE BITARTRATE AND ACETAMINOPHEN 7.5; 325 MG/1; MG/1
1 TABLET ORAL ONCE AS NEEDED
Status: DISCONTINUED | OUTPATIENT
Start: 2019-08-12 | End: 2019-08-12 | Stop reason: HOSPADM

## 2019-08-12 RX ORDER — OXYCODONE AND ACETAMINOPHEN 7.5; 325 MG/1; MG/1
1 TABLET ORAL ONCE AS NEEDED
Status: DISCONTINUED | OUTPATIENT
Start: 2019-08-12 | End: 2019-08-12 | Stop reason: HOSPADM

## 2019-08-12 RX ORDER — LABETALOL HYDROCHLORIDE 5 MG/ML
5 INJECTION, SOLUTION INTRAVENOUS
Status: DISCONTINUED | OUTPATIENT
Start: 2019-08-12 | End: 2019-08-12 | Stop reason: HOSPADM

## 2019-08-12 RX ORDER — DOCUSATE SODIUM 100 MG/1
100 CAPSULE, LIQUID FILLED ORAL 3 TIMES DAILY
Status: DISCONTINUED | OUTPATIENT
Start: 2019-08-12 | End: 2019-08-13 | Stop reason: HOSPADM

## 2019-08-12 RX ORDER — SODIUM CHLORIDE, SODIUM LACTATE, POTASSIUM CHLORIDE, CALCIUM CHLORIDE 600; 310; 30; 20 MG/100ML; MG/100ML; MG/100ML; MG/100ML
50 INJECTION, SOLUTION INTRAVENOUS CONTINUOUS
Status: DISCONTINUED | OUTPATIENT
Start: 2019-08-12 | End: 2019-08-13

## 2019-08-12 RX ORDER — BUPIVACAINE HYDROCHLORIDE AND EPINEPHRINE 5; 5 MG/ML; UG/ML
INJECTION, SOLUTION PERINEURAL AS NEEDED
Status: DISCONTINUED | OUTPATIENT
Start: 2019-08-12 | End: 2019-08-12 | Stop reason: HOSPADM

## 2019-08-12 RX ORDER — OXYCODONE AND ACETAMINOPHEN 7.5; 325 MG/1; MG/1
1 TABLET ORAL ONCE AS NEEDED
Status: DISCONTINUED | OUTPATIENT
Start: 2019-08-12 | End: 2019-08-12 | Stop reason: SDUPTHER

## 2019-08-12 RX ORDER — FENTANYL CITRATE 50 UG/ML
50 INJECTION, SOLUTION INTRAMUSCULAR; INTRAVENOUS
Status: DISCONTINUED | OUTPATIENT
Start: 2019-08-12 | End: 2019-08-12 | Stop reason: SDUPTHER

## 2019-08-12 RX ORDER — PROPOFOL 10 MG/ML
VIAL (ML) INTRAVENOUS AS NEEDED
Status: DISCONTINUED | OUTPATIENT
Start: 2019-08-12 | End: 2019-08-12 | Stop reason: SURG

## 2019-08-12 RX ORDER — PROMETHAZINE HYDROCHLORIDE 25 MG/ML
6.25 INJECTION, SOLUTION INTRAMUSCULAR; INTRAVENOUS
Status: DISCONTINUED | OUTPATIENT
Start: 2019-08-12 | End: 2019-08-12 | Stop reason: HOSPADM

## 2019-08-12 RX ORDER — PROMETHAZINE HYDROCHLORIDE 25 MG/1
25 SUPPOSITORY RECTAL ONCE AS NEEDED
Status: DISCONTINUED | OUTPATIENT
Start: 2019-08-12 | End: 2019-08-12 | Stop reason: SDUPTHER

## 2019-08-12 RX ORDER — FENTANYL CITRATE 50 UG/ML
INJECTION, SOLUTION INTRAMUSCULAR; INTRAVENOUS
Status: COMPLETED
Start: 2019-08-12 | End: 2019-08-12

## 2019-08-12 RX ORDER — ONDANSETRON 2 MG/ML
4 INJECTION INTRAMUSCULAR; INTRAVENOUS ONCE AS NEEDED
Status: DISCONTINUED | OUTPATIENT
Start: 2019-08-12 | End: 2019-08-12 | Stop reason: SDUPTHER

## 2019-08-12 RX ORDER — FENTANYL CITRATE 50 UG/ML
INJECTION, SOLUTION INTRAMUSCULAR; INTRAVENOUS AS NEEDED
Status: DISCONTINUED | OUTPATIENT
Start: 2019-08-12 | End: 2019-08-12 | Stop reason: SURG

## 2019-08-12 RX ORDER — DIPHENHYDRAMINE HCL 25 MG
25 CAPSULE ORAL
Status: DISCONTINUED | OUTPATIENT
Start: 2019-08-12 | End: 2019-08-12 | Stop reason: HOSPADM

## 2019-08-12 RX ORDER — PROMETHAZINE HYDROCHLORIDE 25 MG/ML
12.5 INJECTION, SOLUTION INTRAMUSCULAR; INTRAVENOUS ONCE AS NEEDED
Status: DISCONTINUED | OUTPATIENT
Start: 2019-08-12 | End: 2019-08-12 | Stop reason: SDUPTHER

## 2019-08-12 RX ORDER — DEXAMETHASONE SODIUM PHOSPHATE 10 MG/ML
INJECTION INTRAMUSCULAR; INTRAVENOUS AS NEEDED
Status: DISCONTINUED | OUTPATIENT
Start: 2019-08-12 | End: 2019-08-12 | Stop reason: SURG

## 2019-08-12 RX ORDER — FENTANYL CITRATE 50 UG/ML
50 INJECTION, SOLUTION INTRAMUSCULAR; INTRAVENOUS
Status: DISCONTINUED | OUTPATIENT
Start: 2019-08-12 | End: 2019-08-12 | Stop reason: HOSPADM

## 2019-08-12 RX ORDER — PROMETHAZINE HYDROCHLORIDE 25 MG/1
25 TABLET ORAL ONCE AS NEEDED
Status: DISCONTINUED | OUTPATIENT
Start: 2019-08-12 | End: 2019-08-12 | Stop reason: HOSPADM

## 2019-08-12 RX ORDER — ONDANSETRON 2 MG/ML
INJECTION INTRAMUSCULAR; INTRAVENOUS AS NEEDED
Status: DISCONTINUED | OUTPATIENT
Start: 2019-08-12 | End: 2019-08-12 | Stop reason: SURG

## 2019-08-12 RX ORDER — ACETAMINOPHEN 325 MG/1
650 TABLET ORAL ONCE AS NEEDED
Status: DISCONTINUED | OUTPATIENT
Start: 2019-08-12 | End: 2019-08-12 | Stop reason: HOSPADM

## 2019-08-12 RX ORDER — HYDROCODONE BITARTRATE AND ACETAMINOPHEN 7.5; 325 MG/1; MG/1
1 TABLET ORAL ONCE AS NEEDED
Status: DISCONTINUED | OUTPATIENT
Start: 2019-08-12 | End: 2019-08-12 | Stop reason: SDUPTHER

## 2019-08-12 RX ORDER — SODIUM CHLORIDE, SODIUM LACTATE, POTASSIUM CHLORIDE, CALCIUM CHLORIDE 600; 310; 30; 20 MG/100ML; MG/100ML; MG/100ML; MG/100ML
9 INJECTION, SOLUTION INTRAVENOUS CONTINUOUS
Status: DISCONTINUED | OUTPATIENT
Start: 2019-08-12 | End: 2019-08-12

## 2019-08-12 RX ORDER — HYDROMORPHONE HYDROCHLORIDE 1 MG/ML
0.5 INJECTION, SOLUTION INTRAMUSCULAR; INTRAVENOUS; SUBCUTANEOUS
Status: DISCONTINUED | OUTPATIENT
Start: 2019-08-12 | End: 2019-08-12 | Stop reason: SDUPTHER

## 2019-08-12 RX ORDER — EPHEDRINE SULFATE 50 MG/ML
5 INJECTION, SOLUTION INTRAVENOUS ONCE AS NEEDED
Status: DISCONTINUED | OUTPATIENT
Start: 2019-08-12 | End: 2019-08-12 | Stop reason: HOSPADM

## 2019-08-12 RX ORDER — NALOXONE HCL 0.4 MG/ML
0.2 VIAL (ML) INJECTION AS NEEDED
Status: DISCONTINUED | OUTPATIENT
Start: 2019-08-12 | End: 2019-08-12 | Stop reason: HOSPADM

## 2019-08-12 RX ORDER — GLYCOPYRROLATE 0.2 MG/ML
INJECTION INTRAMUSCULAR; INTRAVENOUS AS NEEDED
Status: DISCONTINUED | OUTPATIENT
Start: 2019-08-12 | End: 2019-08-12 | Stop reason: SURG

## 2019-08-12 RX ORDER — DIPHENHYDRAMINE HCL 25 MG
25 CAPSULE ORAL EVERY 6 HOURS PRN
Status: DISCONTINUED | OUTPATIENT
Start: 2019-08-12 | End: 2019-08-12 | Stop reason: HOSPADM

## 2019-08-12 RX ORDER — ONDANSETRON 2 MG/ML
4 INJECTION INTRAMUSCULAR; INTRAVENOUS ONCE AS NEEDED
Status: DISCONTINUED | OUTPATIENT
Start: 2019-08-12 | End: 2019-08-12 | Stop reason: HOSPADM

## 2019-08-12 RX ORDER — MAGNESIUM HYDROXIDE 1200 MG/15ML
LIQUID ORAL AS NEEDED
Status: DISCONTINUED | OUTPATIENT
Start: 2019-08-12 | End: 2019-08-12 | Stop reason: HOSPADM

## 2019-08-12 RX ORDER — DICYCLOMINE HYDROCHLORIDE 10 MG/1
10 CAPSULE ORAL 4 TIMES DAILY
Status: DISCONTINUED | OUTPATIENT
Start: 2019-08-12 | End: 2019-08-13 | Stop reason: HOSPADM

## 2019-08-12 RX ORDER — ROCURONIUM BROMIDE 10 MG/ML
INJECTION, SOLUTION INTRAVENOUS AS NEEDED
Status: DISCONTINUED | OUTPATIENT
Start: 2019-08-12 | End: 2019-08-12 | Stop reason: SURG

## 2019-08-12 RX ORDER — FAMOTIDINE 10 MG/ML
20 INJECTION, SOLUTION INTRAVENOUS ONCE
Status: COMPLETED | OUTPATIENT
Start: 2019-08-12 | End: 2019-08-12

## 2019-08-12 RX ORDER — ACETAMINOPHEN 325 MG/1
650 TABLET ORAL ONCE AS NEEDED
Status: DISCONTINUED | OUTPATIENT
Start: 2019-08-12 | End: 2019-08-12 | Stop reason: SDUPTHER

## 2019-08-12 RX ORDER — FLUMAZENIL 0.1 MG/ML
0.2 INJECTION INTRAVENOUS AS NEEDED
Status: DISCONTINUED | OUTPATIENT
Start: 2019-08-12 | End: 2019-08-12 | Stop reason: SDUPTHER

## 2019-08-12 RX ORDER — NALOXONE HCL 0.4 MG/ML
0.2 VIAL (ML) INJECTION AS NEEDED
Status: DISCONTINUED | OUTPATIENT
Start: 2019-08-12 | End: 2019-08-12 | Stop reason: SDUPTHER

## 2019-08-12 RX ORDER — SODIUM CHLORIDE 9 MG/ML
INJECTION, SOLUTION INTRAVENOUS AS NEEDED
Status: DISCONTINUED | OUTPATIENT
Start: 2019-08-12 | End: 2019-08-12 | Stop reason: HOSPADM

## 2019-08-12 RX ORDER — HYDROMORPHONE HYDROCHLORIDE 1 MG/ML
0.5 INJECTION, SOLUTION INTRAMUSCULAR; INTRAVENOUS; SUBCUTANEOUS
Status: DISCONTINUED | OUTPATIENT
Start: 2019-08-12 | End: 2019-08-12 | Stop reason: HOSPADM

## 2019-08-12 RX ORDER — SODIUM CHLORIDE 0.9 % (FLUSH) 0.9 %
1-10 SYRINGE (ML) INJECTION AS NEEDED
Status: DISCONTINUED | OUTPATIENT
Start: 2019-08-12 | End: 2019-08-12 | Stop reason: HOSPADM

## 2019-08-12 RX ORDER — HYDRALAZINE HYDROCHLORIDE 20 MG/ML
5 INJECTION INTRAMUSCULAR; INTRAVENOUS
Status: DISCONTINUED | OUTPATIENT
Start: 2019-08-12 | End: 2019-08-12 | Stop reason: HOSPADM

## 2019-08-12 RX ORDER — HYDRALAZINE HYDROCHLORIDE 20 MG/ML
5 INJECTION INTRAMUSCULAR; INTRAVENOUS
Status: DISCONTINUED | OUTPATIENT
Start: 2019-08-12 | End: 2019-08-12 | Stop reason: SDUPTHER

## 2019-08-12 RX ORDER — LIDOCAINE HYDROCHLORIDE 20 MG/ML
INJECTION, SOLUTION INFILTRATION; PERINEURAL AS NEEDED
Status: DISCONTINUED | OUTPATIENT
Start: 2019-08-12 | End: 2019-08-12 | Stop reason: SURG

## 2019-08-12 RX ORDER — HYDROMORPHONE HCL 110MG/55ML
PATIENT CONTROLLED ANALGESIA SYRINGE INTRAVENOUS AS NEEDED
Status: DISCONTINUED | OUTPATIENT
Start: 2019-08-12 | End: 2019-08-12 | Stop reason: SURG

## 2019-08-12 RX ORDER — DIPHENHYDRAMINE HCL 25 MG
25 CAPSULE ORAL
Status: DISCONTINUED | OUTPATIENT
Start: 2019-08-12 | End: 2019-08-12 | Stop reason: SDUPTHER

## 2019-08-12 RX ORDER — DIPHENHYDRAMINE HYDROCHLORIDE 50 MG/ML
12.5 INJECTION INTRAMUSCULAR; INTRAVENOUS
Status: DISCONTINUED | OUTPATIENT
Start: 2019-08-12 | End: 2019-08-12 | Stop reason: SDUPTHER

## 2019-08-12 RX ORDER — PROMETHAZINE HYDROCHLORIDE 25 MG/1
25 SUPPOSITORY RECTAL ONCE AS NEEDED
Status: DISCONTINUED | OUTPATIENT
Start: 2019-08-12 | End: 2019-08-12 | Stop reason: HOSPADM

## 2019-08-12 RX ORDER — LIDOCAINE HYDROCHLORIDE 10 MG/ML
0.5 INJECTION, SOLUTION EPIDURAL; INFILTRATION; INTRACAUDAL; PERINEURAL ONCE AS NEEDED
Status: DISCONTINUED | OUTPATIENT
Start: 2019-08-12 | End: 2019-08-12 | Stop reason: HOSPADM

## 2019-08-12 RX ORDER — MIDAZOLAM HYDROCHLORIDE 1 MG/ML
2 INJECTION INTRAMUSCULAR; INTRAVENOUS
Status: DISCONTINUED | OUTPATIENT
Start: 2019-08-12 | End: 2019-08-12 | Stop reason: HOSPADM

## 2019-08-12 RX ORDER — FLUMAZENIL 0.1 MG/ML
0.2 INJECTION INTRAVENOUS AS NEEDED
Status: DISCONTINUED | OUTPATIENT
Start: 2019-08-12 | End: 2019-08-12 | Stop reason: HOSPADM

## 2019-08-12 RX ORDER — LABETALOL HYDROCHLORIDE 5 MG/ML
5 INJECTION, SOLUTION INTRAVENOUS
Status: DISCONTINUED | OUTPATIENT
Start: 2019-08-12 | End: 2019-08-12 | Stop reason: SDUPTHER

## 2019-08-12 RX ORDER — HYDROCODONE BITARTRATE AND ACETAMINOPHEN 5; 325 MG/1; MG/1
1 TABLET ORAL EVERY 6 HOURS PRN
Status: DISCONTINUED | OUTPATIENT
Start: 2019-08-12 | End: 2019-08-13 | Stop reason: HOSPADM

## 2019-08-12 RX ORDER — PROMETHAZINE HYDROCHLORIDE 25 MG/ML
6.25 INJECTION, SOLUTION INTRAMUSCULAR; INTRAVENOUS
Status: DISCONTINUED | OUTPATIENT
Start: 2019-08-12 | End: 2019-08-12 | Stop reason: SDUPTHER

## 2019-08-12 RX ORDER — MIDAZOLAM HYDROCHLORIDE 1 MG/ML
1 INJECTION INTRAMUSCULAR; INTRAVENOUS
Status: DISCONTINUED | OUTPATIENT
Start: 2019-08-12 | End: 2019-08-12 | Stop reason: HOSPADM

## 2019-08-12 RX ORDER — PROMETHAZINE HYDROCHLORIDE 25 MG/ML
12.5 INJECTION, SOLUTION INTRAMUSCULAR; INTRAVENOUS ONCE AS NEEDED
Status: DISCONTINUED | OUTPATIENT
Start: 2019-08-12 | End: 2019-08-12 | Stop reason: HOSPADM

## 2019-08-12 RX ORDER — EPHEDRINE SULFATE 50 MG/ML
5 INJECTION, SOLUTION INTRAVENOUS ONCE AS NEEDED
Status: DISCONTINUED | OUTPATIENT
Start: 2019-08-12 | End: 2019-08-12 | Stop reason: SDUPTHER

## 2019-08-12 RX ADMIN — HYDROMORPHONE HYDROCHLORIDE 0.5 MG: 1 INJECTION, SOLUTION INTRAMUSCULAR; INTRAVENOUS; SUBCUTANEOUS at 19:23

## 2019-08-12 RX ADMIN — GLYCOPYRROLATE 0.4 MG: 0.2 INJECTION INTRAMUSCULAR; INTRAVENOUS at 15:54

## 2019-08-12 RX ADMIN — SODIUM CHLORIDE, POTASSIUM CHLORIDE, SODIUM LACTATE AND CALCIUM CHLORIDE: 600; 310; 30; 20 INJECTION, SOLUTION INTRAVENOUS at 16:23

## 2019-08-12 RX ADMIN — ROCURONIUM BROMIDE 10 MG: 10 INJECTION INTRAVENOUS at 15:05

## 2019-08-12 RX ADMIN — HYDROMORPHONE HYDROCHLORIDE 0.5 MG: 1 INJECTION, SOLUTION INTRAMUSCULAR; INTRAVENOUS; SUBCUTANEOUS at 17:29

## 2019-08-12 RX ADMIN — FAMOTIDINE 20 MG: 10 INJECTION INTRAVENOUS at 13:06

## 2019-08-12 RX ADMIN — SODIUM CHLORIDE, POTASSIUM CHLORIDE, SODIUM LACTATE AND CALCIUM CHLORIDE 9 ML/HR: 600; 310; 30; 20 INJECTION, SOLUTION INTRAVENOUS at 13:06

## 2019-08-12 RX ADMIN — SODIUM CHLORIDE, POTASSIUM CHLORIDE, SODIUM LACTATE AND CALCIUM CHLORIDE 50 ML/HR: 600; 310; 30; 20 INJECTION, SOLUTION INTRAVENOUS at 19:24

## 2019-08-12 RX ADMIN — TAZOBACTAM SODIUM AND PIPERACILLIN SODIUM 3.38 G: 375; 3 INJECTION, SOLUTION INTRAVENOUS at 06:28

## 2019-08-12 RX ADMIN — ACETAMINOPHEN AND CODEINE PHOSPHATE 2 TABLET: 300; 30 TABLET ORAL at 06:28

## 2019-08-12 RX ADMIN — ROCURONIUM BROMIDE 35 MG: 10 INJECTION INTRAVENOUS at 14:33

## 2019-08-12 RX ADMIN — FENTANYL CITRATE 75 MCG: 50 INJECTION INTRAMUSCULAR; INTRAVENOUS at 14:30

## 2019-08-12 RX ADMIN — TAZOBACTAM SODIUM AND PIPERACILLIN SODIUM 3.38 G: 375; 3 INJECTION, SOLUTION INTRAVENOUS at 15:00

## 2019-08-12 RX ADMIN — HYDROMORPHONE HYDROCHLORIDE 0.5 MG: 1 INJECTION, SOLUTION INTRAMUSCULAR; INTRAVENOUS; SUBCUTANEOUS at 16:52

## 2019-08-12 RX ADMIN — FENTANYL CITRATE 50 MCG: 50 INJECTION INTRAMUSCULAR; INTRAVENOUS at 16:42

## 2019-08-12 RX ADMIN — FENTANYL CITRATE 25 MCG: 50 INJECTION INTRAMUSCULAR; INTRAVENOUS at 14:52

## 2019-08-12 RX ADMIN — HYDROMORPHONE HYDROCHLORIDE 0.5 MG: 2 INJECTION INTRAMUSCULAR; INTRAVENOUS; SUBCUTANEOUS at 15:21

## 2019-08-12 RX ADMIN — LIDOCAINE HYDROCHLORIDE 100 MG: 20 INJECTION, SOLUTION INFILTRATION; PERINEURAL at 14:33

## 2019-08-12 RX ADMIN — FENTANYL CITRATE 50 MCG: 50 INJECTION INTRAMUSCULAR; INTRAVENOUS at 17:22

## 2019-08-12 RX ADMIN — DEXAMETHASONE SODIUM PHOSPHATE 10 MG: 10 INJECTION INTRAMUSCULAR; INTRAVENOUS at 14:52

## 2019-08-12 RX ADMIN — FENTANYL CITRATE 100 MCG: 50 INJECTION INTRAMUSCULAR; INTRAVENOUS at 16:18

## 2019-08-12 RX ADMIN — NEOSTIGMINE METHYLSULFATE 2.5 MG: 1 INJECTION INTRAMUSCULAR; INTRAVENOUS; SUBCUTANEOUS at 15:54

## 2019-08-12 RX ADMIN — PROPOFOL 180 MG: 10 INJECTION, EMULSION INTRAVENOUS at 14:33

## 2019-08-12 RX ADMIN — HYDROMORPHONE HYDROCHLORIDE 0.5 MG: 2 INJECTION INTRAMUSCULAR; INTRAVENOUS; SUBCUTANEOUS at 15:02

## 2019-08-12 RX ADMIN — ACETAMINOPHEN AND CODEINE PHOSPHATE 2 TABLET: 300; 30 TABLET ORAL at 22:28

## 2019-08-12 RX ADMIN — DOCUSATE SODIUM 100 MG: 100 CAPSULE, LIQUID FILLED ORAL at 20:28

## 2019-08-12 RX ADMIN — FENTANYL CITRATE 50 MCG: 50 INJECTION, SOLUTION INTRAMUSCULAR; INTRAVENOUS at 16:42

## 2019-08-12 RX ADMIN — DOCUSATE SODIUM 100 MG: 100 CAPSULE, LIQUID FILLED ORAL at 20:29

## 2019-08-12 RX ADMIN — ONDANSETRON 4 MG: 2 INJECTION INTRAMUSCULAR; INTRAVENOUS at 15:53

## 2019-08-12 RX ADMIN — SODIUM CHLORIDE, POTASSIUM CHLORIDE, SODIUM LACTATE AND CALCIUM CHLORIDE 75 ML/HR: 600; 310; 30; 20 INJECTION, SOLUTION INTRAVENOUS at 18:28

## 2019-08-12 RX ADMIN — MIDAZOLAM 1 MG: 1 INJECTION INTRAMUSCULAR; INTRAVENOUS at 14:17

## 2019-08-12 RX ADMIN — TAZOBACTAM SODIUM AND PIPERACILLIN SODIUM 3.38 G: 375; 3 INJECTION, SOLUTION INTRAVENOUS at 22:32

## 2019-08-12 NOTE — TELEPHONE ENCOUNTER
Dr Pena pt is currently admitted at . Pt was admitted yesterday for infection and abdominal pain. Pt may have to surgery. The surgeon and pt need to consult with Dr Pena sometime today before that decision is made. Pt's # [phone] Please Advise

## 2019-08-12 NOTE — ANESTHESIA POSTPROCEDURE EVALUATION
"Patient: Clementina Hernandez    Procedure Summary     Date:  08/12/19 Room / Location:  [facility] OR 09 / [facility] MAIN OR    Anesthesia Start:  1425 Anesthesia Stop:  1620    Procedure:  LAPARASCOPIC APPENDECTOMY WITH RESECTION OF TORSED EPIPLOICA (N/A Abdomen) Diagnosis:      Surgeon:  Sybil De Anda MD Provider:  Slava Waite MD    Anesthesia Type:  general ASA Status:  2          Anesthesia Type: general  Last vitals  BP   117/68 (08/12/19 1745)   Temp   36.8 °C (98.3 °F) (08/12/19 1620)   Pulse   76 (08/12/19 1745)   Resp   16 (08/12/19 1745)     SpO2   94 % (08/12/19 1745)     Post Anesthesia Care and Evaluation    Patient location during evaluation: bedside  Patient participation: complete - patient participated  Level of consciousness: sleepy but conscious  Pain score: 0  Pain management: adequate  Airway patency: patent  Anesthetic complications: No anesthetic complications    Cardiovascular status: acceptable  Respiratory status: acceptable  Hydration status: acceptable    Comments: /68   Pulse 76   Temp 36.8 °C (98.3 °F) (Oral)   Resp 16   Ht 170.2 cm (67\")   Wt 98 kg (216 lb 1.6 oz)   LMP 07/22/2019 (Exact Date)   SpO2 94%   BMI 33.85 kg/m²         "

## 2019-08-12 NOTE — H&P
History and physical    Primary care physician  Dr. Issac norris    Chief complaint  Abdominal pain    History of present illness  39-year-old white female with history of posterior vaginal wall prolapse who underwent with general hysterectomy on  followed by postop hematoma further evaluated laparoscopically found to have internal bleeding and remove hematoma on August 3, and discharged home in stable condition presented to [facility] emergency room with abdominal pain mainly right upper quadrant right back lower abdomen on the left and right sided rib cage with loose stools and low-grade fever but no nausea vomiting.  Patient denies any black stools or blood in the stools.  Patient evaluated in ER found to have probable infected hematoma admit for management.  Patient fully alert oriented and give me a detailed history with the presence of nursing staff and her  also present at the time of examination.    PAST MEDICAL HISTORY  • History of benign schwannoma    • History of Papanicolaou smear of cervix    • LGA (large for gestational age) fetus affecting management of mother 2017   • Posterior vaginal wall prolapse     •  labor in third trimester    • Rubella non-immune status, antepartum 2017   • Vaginal delivery        PAST SURGICAL HISTORY  Surgical History         Past Surgical History:   Procedure Laterality Date   • ANTERIOR AND POSTERIOR VAGINAL REPAIR N/A 2019     Procedure: ANTERIOR AND POSTERIOR VAGINAL REPAIR;  Surgeon: Dru Pena MD;  [place] [facility] [place] [facility] [place]  Service: Obstetrics/Gynecology   • DENTAL PROCEDURE         IMPLANT   • DIAGNOSTIC LAPAROSCOPY N/A 8/3/2019     Procedure: DIAGNOSTIC LAPAROSCOPY;  Surgeon: Travis Trammell Jr., MD;  Location: [facility] OR;  Service: General   • VAGINAL HYSTERECTOMY N/A 2019     Procedure: VAGINAL HYSTERECTOMY;  Surgeon: Dru Pena MD;  [place] [facility] [place]  Service: Obstetrics/Gynecology  "  • WISDOM TOOTH EXTRACTION             FAMILY HISTORY        Family History   Problem Relation Age of Onset   • No Known Problems Daughter     • No Known Problems Daughter     • No Known Problems Daughter     • No Known Problems Son     • Coronary artery disease Mother     • Malig Hyperthermia Neg Hx        SOCIAL HISTORY  Social History   Social History            Socioeconomic History   • Marital status:        Spouse name: EFRAIN    • Number of children: 5   • Years of education: MASTERS   • Highest education level: Professional school degree (e.g., MD, DDS, DVM, TASHIA)   Occupational History       Comment:     Tobacco Use   • Smoking status: Never Smoker   • Smokeless tobacco: Never Used   Substance and Sexual Activity   • Alcohol use: No   • Drug use: No         ALLERGIES  Patient has no known allergies.  Home medications reviewed     REVIEW OF SYSTEMS  Constitutional: Negative for fever.   HENT: Negative for sore throat.    Eyes: Negative.    Respiratory: Positive for shortness of breath. Negative for cough.    Cardiovascular: Positive for chest pain (R sided).   Gastrointestinal: Positive for abdominal pain (RUQ and diffuse lower). Negative for diarrhea, nausea and vomiting.   Genitourinary: Positive for dysuria and vaginal pain (\"razor\" sharp).   Musculoskeletal: Negative for neck pain.   Skin: Negative for rash.   Allergic/Immunologic: Negative.    Neurological: Negative for weakness, numbness and headaches.   Hematological: Negative.    Psychiatric/Behavioral: Negative.    All other systems reviewed and are negative.     PHYSICAL EXAM  Blood pressure 125/85, pulse 81, temperature 98.6 °F (37 °C), temperature source Oral, resp. rate 18, height 170.2 cm (67\"), weight 98 kg (216 lb 1.6 oz), last menstrual period 07/22/2019, SpO2 98 %, not currently breastfeeding.    Constitutional: She is oriented to person, place, and time and well-developed, well-nourished, and in no distress. No distress. "   Head: Normocephalic.   Mouth/Throat: Mucous membranes are normal.   Eyes: EOM are normal.   Neck: Normal range of motion.   Cardiovascular: Normal rate and regular rhythm. Exam reveals no gallop and no friction rub. No murmur heard.  Pulmonary/Chest: Effort normal. No respiratory distress. She has no wheezes. She has no rhonchi. She has no rales.   Abdominal: Soft. She exhibits no distension. Bowel sounds are absent. There is tenderness (RUQ and diffuse mild lower). There is no guarding.   Musculoskeletal: Normal range of motion. She exhibits no deformity.   Neurological: She is alert and oriented to person, place, and time  Skin: Skin is warm and dry.   Psychiatric: Calm, cooperative      LAB RESULTS  Lab Results (last 24 hours)     Procedure Component Value Units Date/Time    Basic Metabolic Panel [[id number]]  (Abnormal) Collected:  08/12/19 0625    Specimen:  Blood Updated:  08/12/19 0724     Glucose 88 mg/dL      BUN 5 mg/dL      Creatinine 0.54 mg/dL      Sodium 140 mmol/L      Potassium 3.9 mmol/L      Chloride 105 mmol/L      CO2 22.8 mmol/L      Calcium 8.4 mg/dL      eGFR Non African Amer 127 mL/min/1.73      BUN/Creatinine Ratio 9.3     Anion Gap 12.2 mmol/L     Narrative:       GFR Normal >60  Chronic Kidney Disease <60  Kidney Failure <15    CBC & Differential [[id number]] Collected:  08/12/19 0625    Specimen:  Blood Updated:  08/12/19 0705    Narrative:       The following orders were created for panel order CBC & Differential.  Procedure                               Abnormality         Status                     ---------                               -----------         ------                     CBC Auto Differential[[id number]]        Abnormal            Final result                 Please view results for these tests on the individual orders.    CBC Auto Differential [[id number]]  (Abnormal) Collected:  08/12/19 0625    Specimen:  Blood Updated:  08/12/19 0705     WBC 10.39 10*3/mm3      RBC 3.67  10*6/mm3      Hemoglobin 10.7 g/dL      Hematocrit 34.3 %      MCV 93.5 fL      MCH 29.2 pg      MCHC 31.2 g/dL      RDW 13.5 %      RDW-SD 46.0 fl      MPV 10.7 fL      Platelets 269 10*3/mm3      Neutrophil % 74.2 %      Lymphocyte % 15.2 %      Monocyte % 7.1 %      Eosinophil % 2.0 %      Basophil % 0.4 %      Immature Grans % 1.1 %      Neutrophils, Absolute 7.71 10*3/mm3      Lymphocytes, Absolute 1.58 10*3/mm3      Monocytes, Absolute 0.74 10*3/mm3      Eosinophils, Absolute 0.21 10*3/mm3      Basophils, Absolute 0.04 10*3/mm3      Immature Grans, Absolute 0.11 10*3/mm3      nRBC 0.0 /100 WBC     Blood Culture - Blood, Arm, Left [[id number]] Collected:  08/11/19 1637    Specimen:  Blood from Arm, Left Updated:  08/11/19 1702    Blood Culture - Blood, Hand, Right [[id number]] Collected:  08/11/19 1649    Specimen:  Blood from Hand, Right Updated:  08/11/19 1702    Comprehensive Metabolic Panel [[id number]]  (Abnormal) Collected:  08/11/19 1340    Specimen:  Blood Updated:  08/11/19 1415     Glucose 84 mg/dL      BUN 9 mg/dL      Creatinine 0.54 mg/dL      Sodium 135 mmol/L      Potassium 3.8 mmol/L      Chloride 98 mmol/L      CO2 23.7 mmol/L      Calcium 8.9 mg/dL      Total Protein 6.6 g/dL      Albumin 4.00 g/dL      ALT (SGPT) 17 U/L      AST (SGOT) 12 U/L      Alkaline Phosphatase 65 U/L      Total Bilirubin 0.5 mg/dL      eGFR Non African Amer 127 mL/min/1.73      Globulin 2.6 gm/dL      A/G Ratio 1.5 g/dL      BUN/Creatinine Ratio 16.7     Anion Gap 13.3 mmol/L     Narrative:       GFR Normal >60  Chronic Kidney Disease <60  Kidney Failure <15    Lipase [[id number]]  (Normal) Collected:  08/11/19 1340    Specimen:  Blood Updated:  08/11/19 1411     Lipase 19 U/L     Magnesium [[id number]]  (Normal) Collected:  08/11/19 1340    Specimen:  Blood Updated:  08/11/19 1411     Magnesium 2.0 mg/dL     Urinalysis, Microscopic Only - Urine, Clean Catch [[id number]]  (Abnormal) Collected:  08/11/19 1212    Specimen:   Urine, Clean Catch Updated:  08/11/19 1313     RBC, UA 3-5 /HPF      WBC, UA 13-20 /HPF      Bacteria, UA None Seen /HPF      Squamous Epithelial Cells, UA 7-12 /HPF      Hyaline Casts, UA None Seen /LPF      Methodology Manual Light Microscopy        Imaging Results (last 24 hours)     Procedure Component Value Units Date/Time    CT Angiogram Chest With & Without Contrast [[id number]] Collected:  08/12/19 0632     Updated:  08/12/19 0632    Narrative:       CT ANGIOGRAM THORAX WITH CONTRAST, PULMONARY EMBOLISM PROTOCOL     HISTORY: Pulmonary embolism.     COMPARISON: None.     TECHNIQUE: Radiation dose reduction techniques were utilized, including  automated exposure control and exposure modulation based on body size.  Axial contrast-enhanced images of the chest were obtained according to  the pulmonary embolism protocol. Coronal oblique 3-D MIP reformatted  images were supplemented and reviewed.  100 mls of non ionic contrast  was utilized intravenously.     FINDINGS CHEST CT: Lungs well inflated and clear. There are very small  pleural effusions present. Unfortunately, IV contrast bolus is  suboptimal for pulmonary embolism assessment. There is no central or  large proximal embolus demonstrated. Branch vessel assessment is  limited. Aorta nonaneurysmal. 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 Visualized upper abdomen shows a small amount of  ascites mostly about the liver surface.             Impression:       1. Small pleural effusions.  2. Suboptimal IV contrast bolus but no central or large proximal PE  demonstrated.  3. Small amount of ascites.          CT Abdomen Pelvis With Contrast [[id number]] Collected:  08/11/19 1644     Updated:  08/11/19 1651    Narrative:       CT SCAN OF THE ABDOMEN AND PELVIS WITH INTRAVENOUS CONTRAST     HISTORY: Right upper quadrant and right lower quadrant pain. Recent  vaginal hysterectomy.     The CT scan was performed as an emergency procedure through the  abdomen  and pelvis with intravenous contrast and compared to the previous  postoperative CT scan of the abdomen and pelvis performed 9 days ago on  08/02/2019. The following findings are present:  1. The previous CT scan demonstrated some hyperdense free fluid adjacent  to the liver and extending into the pelvis and consistent with  postoperative hematoma. There is some residual lower density free fluid  adjacent to the liver and in both colic gutters and extending into the  deep pelvis on today's exam. No walled off or air-containing focal  abscess is identified. The overall volume of fluid is somewhat less on  today's exam. There is what appears to be a left ovarian cyst measuring  3.7 cm compared to 2.6 cm on the previous exam.  2. There is some effacement of the fat planes in the deep pelvis  consistent with the recent surgery. The large and small bowel loops are  normal in caliber. The liver, spleen, pancreas, both adrenal glands, and  both kidneys are unremarkable. The gallbladder shows no gallstones or  wall thickening. The lung bases are clear.           Radiation dose reduction techniques were utilized, including automated  exposure control and exposure modulation based on body size.     This report was finalized on 8/11/2019 4:48 PM by Dr. Alex Andres M.D.       XR Chest 1 View [[id number]] Collected:  08/11/19 1614     Updated:  08/11/19 1632    Narrative:       ONE VIEW PORTABLE CHEST     HISTORY: Chest wall pain.     FINDINGS: The lungs are well-expanded and clear and the heart and hilar  structures are normal. There is no acute disease or change from  07/31/2019.     This report was finalized on 8/11/2019 4:29 PM by Dr. Alex Andres M.D.             Current Facility-Administered Medications:   •  [MAR Hold] acetaminophen-codeine (TYLENOL #3) 300-30 MG per tablet 2 tablet, 2 tablet, Oral, Q6H PRN, Faith Briggs MD, 2 tablet at 08/12/19 0628  •  [MAR Hold] docusate sodium (COLACE) capsule 100  mg, 100 mg, Oral, TID, Sybil De Anda MD, Stopped at 08/12/19 0855  •  famotidine (PEPCID) injection 20 mg, 20 mg, Intravenous, Once, Slava Waite MD  •  fentaNYL citrate (PF) (SUBLIMAZE) injection 50 mcg, 50 mcg, Intravenous, Q10 Min PRN, Slava Waite MD  •  [MAR Hold] HYDROmorphone (DILAUDID) injection 0.5 mg, 0.5 mg, Intravenous, Q4H PRN, Kathe Chan MD  •  lactated ringers infusion, 150 mL/hr, Intravenous, Continuous, Sybil De Anda MD, Stopped at 08/12/19 1051  •  lactated ringers infusion, 9 mL/hr, Intravenous, Continuous, Slava Waite MD  •  lidocaine PF 1% (XYLOCAINE) injection 0.5 mL, 0.5 mL, Injection, Once PRN, Slava Waite MD  •  midazolam (VERSED) injection 1 mg, 1 mg, Intravenous, Q5 Min PRN **OR** midazolam (VERSED) injection 2 mg, 2 mg, Intravenous, Q5 Min PRN, Slava Waite MD  •  [MAR Hold] ondansetron (ZOFRAN) injection 4 mg, 4 mg, Intravenous, Q4H PRN, Kathe Chan MD  •  [MAR Hold] pantoprazole (PROTONIX) EC tablet 40 mg, 40 mg, Oral, Q AM, Kathe Chan MD  •  [MAR Hold] piperacillin-tazobactam (ZOSYN) 3.375 g in iso-osmotic dextrose 50 ml (premix), 3.375 g, Intravenous, Q8H, Sybil De Anda MD, 3.375 g at 08/12/19 0628  •  sodium chloride 0.9 % flush 1-10 mL, 1-10 mL, Intravenous, PRN, Slava Waite MD     ASSESSMENT  Abdominal pain  Postop hematoma probably infected  Status post vaginal hysterectomy  Status post abdominal hematoma evacuation  Postop anemia  History of posterior vaginal wall prolapse  History of benign schwannoma    PLAN  Admit  IV fluid  IV antibiotics  General surgery and OBGYN to follow patient  Continue home medications  Stress ulcer DVT prophylaxis  Symptomatic treatment for pain  Supportive care  Discussed with   Patient is full code  Follow closely further recommendation according to hospital course    KATHE CHAN MD

## 2019-08-12 NOTE — PLAN OF CARE
Problem: Patient Care Overview  Goal: Plan of Care Review  Outcome: Ongoing (interventions implemented as appropriate)   08/12/19 9949   Coping/Psychosocial   Plan of Care Reviewed With patient;spouse   OTHER   Outcome Summary Pt admitted to [facility]. VSS. PRN Tylenol #3 given for pain. CT done. NPO since MN. Continue to monitor.     Goal: Individualization and Mutuality  Outcome: Ongoing (interventions implemented as appropriate)    Goal: Discharge Needs Assessment  Outcome: Ongoing (interventions implemented as appropriate)    Goal: Interprofessional Rounds/Family Conf  Outcome: Ongoing (interventions implemented as appropriate)

## 2019-08-12 NOTE — OP NOTE
SURGERY  Operative Note :  NEETU    Clementina Hernandez  1980    Procedure Date: 08/12/19    Pre-op Diagnosis:  · Abdominal pain  · Retained fluid after bleed    Post-op Diagnosis:  · Retained fluid without evidence of infection  · Adhesions of mid sigmoid to anterior abdominal wall  · Torsed epiploica suprapubic  · Secondary involvement of the appendix    Procedure:   · Laparoscopic evacuation of fluid  · Laparoscopic torsed epiploica removal  · Laparoscopic appendectomy  · Laparoscopic adhesio lysis  · Flexible sigmoidoscopy    Surgeon: Neetu    Assistant: Link    Indications:  · Nice 39-year-old who underwent an uneventful hysterectomy vaginally, and then developed bleeding requiring a trip to the OR a.m. evacuation of hematoma as well as cautery of a bleeder.  As much hematoma as could be room moved was done.  She presented back to the hospital with complaints of excruciating abdominal pain with bowel movements, with CT scan showing less fluid but some still present    Associated Issues:  · In general not a complainer    Findings:   · 350 cc of liquid hematoma, no retained clot, without any evidence of infection  · Adhesion of mid sigmoid to the anterior abdominal wall with a very inflamed appearance, and very possibly accounting for a peristaltic pain  · Torsed epiploica in the suprapubic area  · Appendix with inflamed appearance distally but felt possibly to simply be secondary  · No evidence of colitis or stricture.    Recommendations:   · Overnight stay with discharge tomorrow did discuss with the  that the  · Area on the colon at the mid sigmoid looked very inflamed and could in fact be a diverticulum that was inflamed and if so would require a treatment with antibiotics and if unsuccessful surgical resection.    Specimens:   · Fluid for culture    EBL: Less than 50 cc    Technique:     General anesthetic was induced, split leg table, arms tucked, Dominguez catheter, abdomen prepped with  ChloraPrep and draped sterilely.  I made an incision through the prior 5 mm site at the umbilicus and extended that a little bit.  CO2 was insufflated via the Veress needle followed by a 10 mm trocar and a 10 mm scope.  The other 10 mm site in the right upper abdomen was opened and a 10 mm trocar placed there.  A 5 mm was placed below that.    I suctioned out the fluid in the pelvis as well as over the liver completely.  I divided a lot of the of the scar tissue to the recto sigmoid junction as it coursed behind it in order to make sure that it was loose.  This revealed the torus epiploica which I removed with the Enseal.  I took down the adhesion of the colon to the anterior abdominal wall.  I located the appendix and sides very inflamed appearance and ultimately resected that with Enseal to the mesoappendix and at the base and Ethicon vascular staple load across it.  The appendix then epiploica was placed into the bag and brought out through the 10 mm site.  All trochars were removed.  Prior to removal there was irrigation in the pelvis as well as over the liver and no residual fluid seen.    Also prior to removal, a dorsi was placed across the distal descending, and I carried out a flex will sigmoidoscopy after that point to ensure that there was no stricture or compressing mass or adhesion, or colitis.    10 mm site at the umbilicus was closed with 0 Vicryl suture.  That in the right upper quadrant could not be found at the fascial site.  Skin was closed with both sites with 3-0 Vicryl at the dermis, skin at all with 5-0 Vicryl to the skin.  Dermabond        Sybil De Anda MD  08/12/19  5:26 PM

## 2019-08-12 NOTE — ANESTHESIA PREPROCEDURE EVALUATION
" Anesthesia Evaluation     Patient summary reviewed and Nursing notes reviewed   no history of anesthetic complications:  NPO Solid Status: > 8 hours  NPO Liquid Status: > 2 hours           Airway   Mallampati: I  Neck ROM: full  no difficulty expected  Comment: Somewhat of a \"hitch\" in her jaw with opening widely and pop sound  Dental - normal exam     Pulmonary - negative pulmonary ROS and normal exam   (-) COPD, asthma, sleep apnea, not a smoker    PE comment: nonlabored  Cardiovascular - normal exam    Rhythm: regular  Rate: normal    (+) hypertension, hyperlipidemia,   (-) valvular problems/murmurs, past MI, CAD, dysrhythmias, angina      Neuro/Psych- negative ROS  (-) seizures, TIA, CVA    ROS Comment: Spinal cord schwannoma  GI/Hepatic/Renal/Endo    (+) obesity,   hypothyroidism,   (-) GERD, liver disease, no renal disease, diabetes, hyperthyroidism    Musculoskeletal (-) negative ROS    Abdominal    Substance History      OB/GYN          Other                          Anesthesia Plan    ASA 2     general     intravenous induction   Anesthetic plan, all risks, benefits, and alternatives have been provided, discussed and informed consent has been obtained with: patient.      "

## 2019-08-12 NOTE — ANESTHESIA PROCEDURE NOTES
Peripheral IV    Patient location during procedure: OR  Line placed for Fluids/Medication Admin.  Performed By   CRNA: Elise Mcghee CRNA  Preanesthetic Checklist  Completed: patient identified, site marked, surgical consent, pre-op evaluation, timeout performed and monitors and equipment checked  Peripheral IV Prep   Patient position: supine   Prep: ChloraPrep  Patient monitoring: heart rate and cardiac monitor  Peripheral IV Procedure   Laterality:right  Location:  Hand  Catheter size: 22 G         Post Assessment   Dressing Type: tape and transparent.    IV Dressing/Site: clean, dry and intact

## 2019-08-12 NOTE — CONSULTS
"    Referring Provider: Neetu  Reason for Consultation: Postoperative complication    Patient Care Team:  Issac Chavez MD as PCP - General (Internal Medicine)  Provider, No Known as PCP - Family Medicine    Chief complaint pelvic and abdominal pain, low grade fever    Subjective .     History of present illness:    38 yo  who is 12 days post vaginal hysterectomy and 8 days postoperative from laparoscopy and evacuation of pelvic hematoma.  Since discharge from second surgery she has had painful bowel movements and diarrhea and now worsening abdominal and right sided pelvic and chest pain.  Diarrhea resolved but still pain with bowel movements.  Low grade fever at home.  No dysuria, no vaginal bleeding, no foul discharge.  No N/V.    Seen in ER and CT scan shows fluid in pelvis to the right but less than on previous CT.  Since her hemoglobin is normal this is likely residual fluid and less likely a new hematoma.    Of note pt has only taken a few tylenol #3 this whole week.      Review of Systems  Pertinent items are noted in HPI   No SOB,     History  Past Medical History:   Diagnosis Date   • History of benign schwannoma     Involving lumbar spine low the level of L-2 Dr. Chencho Skelton mentioned in  that it was \"reaching out to us\".   • History of Papanicolaou smear of cervix     Pap smear Hx: \" No history of abnormal Pap smears before \".   Pap smear Hx: , '10, Normal yearly Paps.  , ASCUS, Neg HR-HPV.  , Neg Pap, Neg HR-HPV.  , '16, Neg Paps.  2017, Neg Pap, Neg HR-HPV.   • LGA (large for gestational age) fetus affecting management of mother 2017   • Posterior vaginal wall prolapse    •  labor in third trimester    • Rubella non-immune status, antepartum 2017   • Vaginal delivery     x5   Angle   - Marti  -   Kevin  -  2016  Cece -   2017   ,   Past Surgical History:   Procedure Laterality Date   • ANTERIOR AND POSTERIOR VAGINAL REPAIR " N/A 7/31/2019    Procedure: ANTERIOR AND POSTERIOR VAGINAL REPAIR;  Surgeon: Dru Pena MD;  Location: [facility] OR [facility] [place];  Service: Obstetrics/Gynecology   • DENTAL PROCEDURE      IMPLANT   • DIAGNOSTIC LAPAROSCOPY N/A 8/3/2019    Procedure: DIAGNOSTIC LAPAROSCOPY;  Surgeon: Travis Trammell Jr., MD;  Location: [facility] MAIN OR;  Service: General   • VAGINAL HYSTERECTOMY N/A 7/31/2019    Procedure: VAGINAL HYSTERECTOMY;  Surgeon: Dru Pena MD;  Location: [facility] OR [facility] [place];  Service: Obstetrics/Gynecology   • WISDOM TOOTH EXTRACTION     ,   Family History   Problem Relation Age of Onset   • No Known Problems Daughter    • No Known Problems Daughter    • No Known Problems Daughter    • No Known Problems Son    • Coronary artery disease Mother    • Malig Hyperthermia Neg Hx    ,   Social History     Tobacco Use   • Smoking status: Never Smoker   • Smokeless tobacco: Never Used   Substance Use Topics   • Alcohol use: No   • Drug use: No   ,   Medications Prior to Admission   Medication Sig Dispense Refill Last Dose   • dicyclomine (BENTYL) 10 MG capsule Take 1 capsule by mouth 4 (Four) Times a Day for 30 days. 120 capsule 0 8/8/2019 at Unknown time   • docusate sodium (COLACE) 100 MG capsule Take 100 mg by mouth 3 (Three) Times a Day.   8/11/2019 at Unknown time    and Allergies:  Patient has no known allergies.    Objective     Vital Signs   Temp:  [98.5 °F (36.9 °C)-99.1 °F (37.3 °C)] 98.5 °F (36.9 °C)  Heart Rate:  [] 84  Resp:  [18] 18  BP: (110-126)/(66-99) 110/68    Physical Exam:   General Appearance: alert, appears stated age and cooperative  Lungs: respirations regular, respirations even and respirations unlabored  Heart: regular rhythm & normal rate  Abdomen: normal bowel sounds, no masses, soft non-tender, no guarding, no rebound tenderness and incisions CDI and no erythema  Pelvic: Exam deferred  Extremities: moves extremities well, no edema, no cyanosis and no redness  Neurologic: Mental Status  orientated to person, place, time and situation, Speech normal content and execusion  Psych: normal    Results Review:   I reviewed the patient's new clinical results.      Assessment/Plan       Abdominal pain      I agree with admission and antibiotics and serial hemoglobin.  It seems likely that residual fluid in pelvis may be a source of surgical site infection which could be causing her symptoms and elevation of WBC ct. No definitive abscess seen.     I discussed the patients findings and my recommendations with patient   I also discussed findings with primary GYN Dr Pena who reviewed pt with the consulting physician Dr De Anda as it was late at night with a stable patient.    Faith Briggs MD  08/11/19  10:16 PM    Time: 30 minutes

## 2019-08-12 NOTE — ANESTHESIA PROCEDURE NOTES
Airway  Urgency: elective    Airway not difficult    General Information and Staff    Patient location during procedure: OR  Anesthesiologist: Slava Waite MD  CRNA: Elise Mcghee CRNA    Indications and Patient Condition  Indications for airway management: airway protection    Preoxygenated: yes  Mask difficulty assessment: 1 - vent by mask    Final Airway Details  Final airway type: endotracheal airway      Successful airway: ETT  Cuffed: yes   Successful intubation technique: direct laryngoscopy  Facilitating devices/methods: intubating stylet  Endotracheal tube insertion site: oral  Blade: Gary  Blade size: 3  ETT size (mm): 7.0  Cormack-Lehane Classification: grade I - full view of glottis  Placement verified by: chest auscultation and capnometry   Measured from: lips  ETT to lips (cm): 22  Number of attempts at approach: 1    Additional Comments  Preoxygenated with 100% FiO2. Smooth IV induction. Atraumatic insertion. No change to dentition or soft tissue.

## 2019-08-12 NOTE — PAYOR COMM NOTE
"Edu Hernandez (39 y.o. Female)     ATTN:  TRINA THOMSON   ID#[id number]   ICD 10 CODE=R10.9  PLEASE NOTE PT IS STILL IN SURGERY NOW - WILL FAX ADDITIONAL  CLINICALS IN A.M.  PLEASE CALL BACK TO EDUARDO [phone] OR [phone]  THANKS!   EDUARDO      Date of Birth Social Security Number Address Home Phone MRN    1980  [address] [facility] [facility] [phone] [id number]    [employer] Marital Status          Brad        Admission Date Admission Type Admitting Provider Attending Provider Department, Room/Bed    8/11/19 Emergency Tino Chan MD Ahmed, Aftab, MD [facility] MAIN OR, [facility]/MAIN OR    Discharge Date Discharge Disposition Discharge Destination                       Attending Provider:  Tino Chan MD    Allergies:  No Known Allergies    Isolation:  None   Infection:  None   Code Status:  CPR    Ht:  170.2 cm (67\")   Wt:  98 kg (216 lb 1.6 oz)    Admission Cmt:  None   Principal Problem:  None                Active Insurance as of 8/11/2019     Primary Coverage     Payor Plan Insurance Group Employer/Plan Group    [employer] [facility] [id number]     Payor Plan Address Payor Plan Phone Number Payor Plan Fax Number Effective Dates    PO    4/23/2019 - None Entered    [facility] [id number]       Subscriber Name Subscriber Birth Date Member ID       EDU HERNANDEZ 1980 [id number]                 Emergency Contacts      (Rel.) Home Phone Work Phone Mobile Phone    Issac Hernandez (Spouse) [phone] -- [phone]               History & Physical      Tino Chan MD at 8/12/2019 12:32 PM          History and physical    Primary care physician  Dr. Issac norris    Chief complaint  Abdominal pain    History of present illness  39-year-old white female with history of posterior vaginal wall prolapse who underwent with general hysterectomy on July 31 followed by postop hematoma further evaluated " laparoscopically found to have internal bleeding and remove hematoma on August 3, and discharged home in stable condition presented to [facility] emergency room with abdominal pain mainly right upper quadrant right back lower abdomen on the left and right sided rib cage with loose stools and low-grade fever but no nausea vomiting.  Patient denies any black stools or blood in the stools.  Patient evaluated in ER found to have probable infected hematoma admit for management.  Patient fully alert oriented and give me a detailed history with the presence of nursing staff and her  also present at the time of examination.    PAST MEDICAL HISTORY  • History of benign schwannoma    • History of Papanicolaou smear of cervix    • LGA (large for gestational age) fetus affecting management of mother 2017   • Posterior vaginal wall prolapse     •  labor in third trimester    • Rubella non-immune status, antepartum 2017   • Vaginal delivery        PAST SURGICAL HISTORY  Surgical History         Past Surgical History:   Procedure Laterality Date   • ANTERIOR AND POSTERIOR VAGINAL REPAIR N/A 2019     Procedure: ANTERIOR AND POSTERIOR VAGINAL REPAIR;  Surgeon: Dru Pena MD;  [place] [facility] [place] [facility] [place]  Service: Obstetrics/Gynecology   • DENTAL PROCEDURE         IMPLANT   • DIAGNOSTIC LAPAROSCOPY N/A 8/3/2019     Procedure: DIAGNOSTIC LAPAROSCOPY;  Surgeon: Travis Trammell Jr., MD;  Location: [facility] OR;  Service: General   • VAGINAL HYSTERECTOMY N/A 2019     Procedure: VAGINAL HYSTERECTOMY;  Surgeon: Dru Pena MD;  [place] [facility] [place] [facility] [place]  Service: Obstetrics/Gynecology   • WISDOM TOOTH EXTRACTION             FAMILY HISTORY        Family History   Problem Relation Age of Onset   • No Known Problems Daughter     • No Known Problems Daughter     • No Known Problems Daughter     • No Known Problems Son     • Coronary artery disease Mother     • Malig Hyperthermia Neg  "Hx        SOCIAL HISTORY  Social History   Social History            Socioeconomic History   • Marital status:        Spouse name: EFRAIN    • Number of children: 5   • Years of education: MASTERS   • Highest education level: Professional school degree (e.g., MD, DDS, DVM, TASHIA)   Occupational History       Comment:     Tobacco Use   • Smoking status: Never Smoker   • Smokeless tobacco: Never Used   Substance and Sexual Activity   • Alcohol use: No   • Drug use: No         ALLERGIES  Patient has no known allergies.  Home medications reviewed     REVIEW OF SYSTEMS  Constitutional: Negative for fever.   HENT: Negative for sore throat.    Eyes: Negative.    Respiratory: Positive for shortness of breath. Negative for cough.    Cardiovascular: Positive for chest pain (R sided).   Gastrointestinal: Positive for abdominal pain (RUQ and diffuse lower). Negative for diarrhea, nausea and vomiting.   Genitourinary: Positive for dysuria and vaginal pain (\"razor\" sharp).   Musculoskeletal: Negative for neck pain.   Skin: Negative for rash.   Allergic/Immunologic: Negative.    Neurological: Negative for weakness, numbness and headaches.   Hematological: Negative.    Psychiatric/Behavioral: Negative.    All other systems reviewed and are negative.     PHYSICAL EXAM  Blood pressure 125/85, pulse 81, temperature 98.6 °F (37 °C), temperature source Oral, resp. rate 18, height 170.2 cm (67\"), weight 98 kg (216 lb 1.6 oz), last menstrual period 07/22/2019, SpO2 98 %, not currently breastfeeding.    Constitutional: She is oriented to person, place, and time and well-developed, well-nourished, and in no distress. No distress.   Head: Normocephalic.   Mouth/Throat: Mucous membranes are normal.   Eyes: EOM are normal.   Neck: Normal range of motion.   Cardiovascular: Normal rate and regular rhythm. Exam reveals no gallop and no friction rub. No murmur heard.  Pulmonary/Chest: Effort normal. No respiratory distress. She has no " wheezes. She has no rhonchi. She has no rales.   Abdominal: Soft. She exhibits no distension. Bowel sounds are absent. There is tenderness (RUQ and diffuse mild lower). There is no guarding.   Musculoskeletal: Normal range of motion. She exhibits no deformity.   Neurological: She is alert and oriented to person, place, and time  Skin: Skin is warm and dry.   Psychiatric: Calm, cooperative      LAB RESULTS  Lab Results (last 24 hours)     Procedure Component Value Units Date/Time    Basic Metabolic Panel [[id number]]  (Abnormal) Collected:  08/12/19 0625    Specimen:  Blood Updated:  08/12/19 0724     Glucose 88 mg/dL      BUN 5 mg/dL      Creatinine 0.54 mg/dL      Sodium 140 mmol/L      Potassium 3.9 mmol/L      Chloride 105 mmol/L      CO2 22.8 mmol/L      Calcium 8.4 mg/dL      eGFR Non African Amer 127 mL/min/1.73      BUN/Creatinine Ratio 9.3     Anion Gap 12.2 mmol/L     Narrative:       GFR Normal >60  Chronic Kidney Disease <60  Kidney Failure <15    CBC & Differential [[id number]] Collected:  08/12/19 0625    Specimen:  Blood Updated:  08/12/19 0705    Narrative:       The following orders were created for panel order CBC & Differential.  Procedure                               Abnormality         Status                     ---------                               -----------         ------                     CBC Auto Differential[[id number]]        Abnormal            Final result                 Please view results for these tests on the individual orders.    CBC Auto Differential [[id number]]  (Abnormal) Collected:  08/12/19 0625    Specimen:  Blood Updated:  08/12/19 0705     WBC 10.39 10*3/mm3      RBC 3.67 10*6/mm3      Hemoglobin 10.7 g/dL      Hematocrit 34.3 %      MCV 93.5 fL      MCH 29.2 pg      MCHC 31.2 g/dL      RDW 13.5 %      RDW-SD 46.0 fl      MPV 10.7 fL      Platelets 269 10*3/mm3      Neutrophil % 74.2 %      Lymphocyte % 15.2 %      Monocyte % 7.1 %      Eosinophil % 2.0 %      Basophil %  0.4 %      Immature Grans % 1.1 %      Neutrophils, Absolute 7.71 10*3/mm3      Lymphocytes, Absolute 1.58 10*3/mm3      Monocytes, Absolute 0.74 10*3/mm3      Eosinophils, Absolute 0.21 10*3/mm3      Basophils, Absolute 0.04 10*3/mm3      Immature Grans, Absolute 0.11 10*3/mm3      nRBC 0.0 /100 WBC     Blood Culture - Blood, Arm, Left [[id number]] Collected:  08/11/19 1637    Specimen:  Blood from Arm, Left Updated:  08/11/19 1702    Blood Culture - Blood, Hand, Right [[id number]] Collected:  08/11/19 1649    Specimen:  Blood from Hand, Right Updated:  08/11/19 1702    Comprehensive Metabolic Panel [[id number]]  (Abnormal) Collected:  08/11/19 1340    Specimen:  Blood Updated:  08/11/19 1415     Glucose 84 mg/dL      BUN 9 mg/dL      Creatinine 0.54 mg/dL      Sodium 135 mmol/L      Potassium 3.8 mmol/L      Chloride 98 mmol/L      CO2 23.7 mmol/L      Calcium 8.9 mg/dL      Total Protein 6.6 g/dL      Albumin 4.00 g/dL      ALT (SGPT) 17 U/L      AST (SGOT) 12 U/L      Alkaline Phosphatase 65 U/L      Total Bilirubin 0.5 mg/dL      eGFR Non African Amer 127 mL/min/1.73      Globulin 2.6 gm/dL      A/G Ratio 1.5 g/dL      BUN/Creatinine Ratio 16.7     Anion Gap 13.3 mmol/L     Narrative:       GFR Normal >60  Chronic Kidney Disease <60  Kidney Failure <15    Lipase [[id number]]  (Normal) Collected:  08/11/19 1340    Specimen:  Blood Updated:  08/11/19 1411     Lipase 19 U/L     Magnesium [[id number]]  (Normal) Collected:  08/11/19 1340    Specimen:  Blood Updated:  08/11/19 1411     Magnesium 2.0 mg/dL     Urinalysis, Microscopic Only - Urine, Clean Catch [[id number]]  (Abnormal) Collected:  08/11/19 1212    Specimen:  Urine, Clean Catch Updated:  08/11/19 1313     RBC, UA 3-5 /HPF      WBC, UA 13-20 /HPF      Bacteria, UA None Seen /HPF      Squamous Epithelial Cells, UA 7-12 /HPF      Hyaline Casts, UA None Seen /LPF      Methodology Manual Light Microscopy        Imaging Results (last 24 hours)     Procedure  Component Value Units Date/Time    CT Angiogram Chest With & Without Contrast [[id number]] Collected:  08/12/19 0632     Updated:  08/12/19 0632    Narrative:       CT ANGIOGRAM THORAX WITH CONTRAST, PULMONARY EMBOLISM PROTOCOL     HISTORY: Pulmonary embolism.     COMPARISON: None.     TECHNIQUE: Radiation dose reduction techniques were utilized, including  automated exposure control and exposure modulation based on body size.  Axial contrast-enhanced images of the chest were obtained according to  the pulmonary embolism protocol. Coronal oblique 3-D MIP reformatted  images were supplemented and reviewed.  100 mls of non ionic contrast  was utilized intravenously.     FINDINGS CHEST CT: Lungs well inflated and clear. There are very small  pleural effusions present. Unfortunately, IV contrast bolus is  suboptimal for pulmonary embolism assessment. There is no central or  large proximal embolus demonstrated. Branch vessel assessment is  limited. Aorta nonaneurysmal. 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 Visualized upper abdomen shows a small amount of  ascites mostly about the liver surface.             Impression:       1. Small pleural effusions.  2. Suboptimal IV contrast bolus but no central or large proximal PE  demonstrated.  3. Small amount of ascites.          CT Abdomen Pelvis With Contrast [[id number]] Collected:  08/11/19 1644     Updated:  08/11/19 1651    Narrative:       CT SCAN OF THE ABDOMEN AND PELVIS WITH INTRAVENOUS CONTRAST     HISTORY: Right upper quadrant and right lower quadrant pain. Recent  vaginal hysterectomy.     The CT scan was performed as an emergency procedure through the abdomen  and pelvis with intravenous contrast and compared to the previous  postoperative CT scan of the abdomen and pelvis performed 9 days ago on  08/02/2019. The following findings are present:  1. The previous CT scan demonstrated some hyperdense free fluid adjacent  to the liver and extending  into the pelvis and consistent with  postoperative hematoma. There is some residual lower density free fluid  adjacent to the liver and in both colic gutters and extending into the  deep pelvis on today's exam. No walled off or air-containing focal  abscess is identified. The overall volume of fluid is somewhat less on  today's exam. There is what appears to be a left ovarian cyst measuring  3.7 cm compared to 2.6 cm on the previous exam.  2. There is some effacement of the fat planes in the deep pelvis  consistent with the recent surgery. The large and small bowel loops are  normal in caliber. The liver, spleen, pancreas, both adrenal glands, and  both kidneys are unremarkable. The gallbladder shows no gallstones or  wall thickening. The lung bases are clear.           Radiation dose reduction techniques were utilized, including automated  exposure control and exposure modulation based on body size.     This report was finalized on 8/11/2019 4:48 PM by Dr. Alex Andres M.D.       XR Chest 1 View [[id number]] Collected:  08/11/19 1614     Updated:  08/11/19 1632    Narrative:       ONE VIEW PORTABLE CHEST     HISTORY: Chest wall pain.     FINDINGS: The lungs are well-expanded and clear and the heart and hilar  structures are normal. There is no acute disease or change from  07/31/2019.     This report was finalized on 8/11/2019 4:29 PM by Dr. Alex Andres M.D.             Current Facility-Administered Medications:   •  [MAR Hold] acetaminophen-codeine (TYLENOL #3) 300-30 MG per tablet 2 tablet, 2 tablet, Oral, Q6H PRN, Faith Briggs MD, 2 tablet at 08/12/19 0628  •  [MAR Hold] docusate sodium (COLACE) capsule 100 mg, 100 mg, Oral, TID, Sybil De Anda MD, Stopped at 08/12/19 0855  •  famotidine (PEPCID) injection 20 mg, 20 mg, Intravenous, Once, Slava Waite MD  •  fentaNYL citrate (PF) (SUBLIMAZE) injection 50 mcg, 50 mcg, Intravenous, Q10 Min PRN, Slava Waite MD  •  [MAR Hold]  HYDROmorphone (DILAUDID) injection 0.5 mg, 0.5 mg, Intravenous, Q4H PRN, Kathe Chan MD  •  lactated ringers infusion, 150 mL/hr, Intravenous, Continuous, Sybil De Anda MD, Stopped at 08/12/19 1051  •  lactated ringers infusion, 9 mL/hr, Intravenous, Continuous, Slava Waite MD  •  lidocaine PF 1% (XYLOCAINE) injection 0.5 mL, 0.5 mL, Injection, Once PRN, Slava Waite MD  •  midazolam (VERSED) injection 1 mg, 1 mg, Intravenous, Q5 Min PRN **OR** midazolam (VERSED) injection 2 mg, 2 mg, Intravenous, Q5 Min PRN, Slava Waite MD  •  [MAR Hold] ondansetron (ZOFRAN) injection 4 mg, 4 mg, Intravenous, Q4H PRN, Kathe Chan MD  •  [MAR Hold] pantoprazole (PROTONIX) EC tablet 40 mg, 40 mg, Oral, Q AM, Kathe Chan MD  •  [MAR Hold] piperacillin-tazobactam (ZOSYN) 3.375 g in iso-osmotic dextrose 50 ml (premix), 3.375 g, Intravenous, Q8H, Sybil De Anda MD, 3.375 g at 08/12/19 0628  •  sodium chloride 0.9 % flush 1-10 mL, 1-10 mL, Intravenous, PRN, Slava Waite MD     ASSESSMENT  Abdominal pain  Postop hematoma probably infected  Status post vaginal hysterectomy  Status post abdominal hematoma evacuation  Postop anemia  History of posterior vaginal wall prolapse  History of benign schwannoma    PLAN  Admit  IV fluid  IV antibiotics  General surgery and OBGYN to follow patient  Continue home medications  Stress ulcer DVT prophylaxis  Symptomatic treatment for pain  Supportive care  Discussed with   Patient is full code  Follow closely further recommendation according to hospital course    KATHE CHAN MD          Electronically signed by Kathe Chan MD at 8/12/2019 12:44 PM          Emergency Department Notes      Jayjay Moreira, RN at 8/11/2019 11:58 AM        Pt reports abd pain rt side and rt shoulder pain pt states had Hysterectomy 7/31/19 had Laparoscopy by Dr. Trammell on 8/3/19.     Electronically signed by Jayjay Moreira RN at 8/11/2019 11:59 AM     Sudhir Navas MD at  "8/11/2019 12:02 PM           EMERGENCY DEPARTMENT ENCOUNTER    CHIEF COMPLAINT  Chief Complaint: Abdominal pain  History given by: Patient  History limited by: Nothing  Room Number: 34/34  PMD: Issac Chavez MD      HPI:  Pt is a 39 y.o. female who presents complaining of RUQ abdominal pain that began 4 days ago. Pt is also complaining of R sided chest pain, diffuse lower abdominal pain that is worse after bowel movements, \"razor sharp\" vaginal pain, SOA, and dysuria. Pt denies nausea, vomiting, diarrhea and cough. Pt reports she had a hysterectomy on 7/31/19 by  and then had a laparoscopy to stop internal bleeding and remove a hematoma from the hysterectomy on 8/3/19 by . Pt reports she received 4 units of blood after the laparoscopy. Pt reports she was having diarrhea after the surgery but  called her in an antibiotic one week ago and she is now having solid bowel movements.     Duration:  4 days  Onset: Gradual  Timing: Constant  Location: RUQ  Radiation: None  Quality: Pain  Intensity/Severity: Moderate  Progression: Unchanged  Associated Symptoms: R sided chest pain, diffuse lower abdominal pain that is worse after bowel movements, \"razor sharp\" vaginal pain, SOA, and dysuria  Aggravating Factors: None  Alleviating Factors: None  Previous Episodes: None  Treatment before arrival: None    PAST MEDICAL HISTORY  Active Ambulatory Problems     Diagnosis Date Noted   • Breast injury 12/15/2016   • Chest pain 05/09/2016   • Accumulation of fluid in tissues 12/15/2016   • Fatigue 12/15/2016   • Family history of coronary artery disease 05/09/2016   • Personal history of other specified conditions 05/09/2016   • Benign hypertension 05/09/2016   • Hyperlipidemia 05/09/2016   • Adult body mass index greater than 30 05/09/2016   • Adult hypothyroidism 12/15/2016   • Schwannoma of spinal cord (CMS/HCC) 12/15/2016   • Neurilemmoma 05/09/2016   • Staph skin infection 12/15/2016   • Subclinical " hypothyroidism 2016   • Avitaminosis D 12/15/2016   • Uterine prolapse 2018   • Pelvic relaxation due to cystocele, midline 2018   • Pelvic relaxation due to rectocele 2018   • Menorrhagia with regular cycle 2019   • Cystocele with uterine prolapse 2019   • Enterocele 2019   • Anemia due to acute blood loss 2019   • Anemia 2019     Resolved Ambulatory Problems     Diagnosis Date Noted   • Abnormal finding on thyroid function test 12/15/2016   • HLD (hyperlipidemia) 12/15/2016   • 38 weeks gestation of pregnancy 2017   • Pregnancy 2017   • Encounter for supervision of other normal pregnancy, second trimester 2017   • LGA (large for gestational age) fetus affecting management of mother 2017   • Pregnancy 2017   • Rubella non-immune status, antepartum 2017     Past Medical History:   Diagnosis Date   • History of benign schwannoma    • History of Papanicolaou smear of cervix    • LGA (large for gestational age) fetus affecting management of mother 2017   • Posterior vaginal wall prolapse    •  labor in third trimester    • Rubella non-immune status, antepartum 2017   • Vaginal delivery        PAST SURGICAL HISTORY  Past Surgical History:   Procedure Laterality Date   • ANTERIOR AND POSTERIOR VAGINAL REPAIR N/A 2019    Procedure: ANTERIOR AND POSTERIOR VAGINAL REPAIR;  Surgeon: Dru Pena MD;  [place] [facility] [place] [facility] [place]  Service: Obstetrics/Gynecology   • DENTAL PROCEDURE      IMPLANT   • DIAGNOSTIC LAPAROSCOPY N/A 8/3/2019    Procedure: DIAGNOSTIC LAPAROSCOPY;  Surgeon: Travis Trammell Jr., MD;  Location: [facility] OR;  Service: General   • VAGINAL HYSTERECTOMY N/A 2019    Procedure: VAGINAL HYSTERECTOMY;  Surgeon: Dru Pena MD;  [facility] [place];  Service: Obstetrics/Gynecology   • WISDOM TOOTH EXTRACTION         FAMILY HISTORY  Family History   Problem Relation Age of Onset  "  • No Known Problems Daughter    • No Known Problems Daughter    • No Known Problems Daughter    • No Known Problems Son    • Coronary artery disease Mother    • Malig Hyperthermia Neg Hx        SOCIAL HISTORY  Social History     Socioeconomic History   • Marital status:      Spouse name: EFRAIN    • Number of children: 5   • Years of education: MASTERS   • Highest education level: Professional school degree (e.g., MD, RONAKS, DVM, TASHIA)   Occupational History     Comment:     Tobacco Use   • Smoking status: Never Smoker   • Smokeless tobacco: Never Used   Substance and Sexual Activity   • Alcohol use: No   • Drug use: No       ALLERGIES  Patient has no known allergies.    REVIEW OF SYSTEMS  Review of Systems   Constitutional: Negative for fever.   HENT: Negative for sore throat.    Eyes: Negative.    Respiratory: Positive for shortness of breath. Negative for cough.    Cardiovascular: Positive for chest pain (R sided).   Gastrointestinal: Positive for abdominal pain (RUQ and diffuse lower). Negative for diarrhea, nausea and vomiting.   Genitourinary: Positive for dysuria and vaginal pain (\"razor\" sharp).   Musculoskeletal: Negative for neck pain.   Skin: Negative for rash.   Allergic/Immunologic: Negative.    Neurological: Negative for weakness, numbness and headaches.   Hematological: Negative.    Psychiatric/Behavioral: Negative.    All other systems reviewed and are negative.      PHYSICAL EXAM  ED Triage Vitals [08/11/19 1159]   Temp Heart Rate Resp BP SpO2   99.1 °F (37.3 °C) 110 18 -- 98 %      Temp src Heart Rate Source Patient Position BP Location FiO2 (%)   Tympanic Monitor -- -- --       Physical Exam   Constitutional: She is oriented to person, place, and time and well-developed, well-nourished, and in no distress. No distress.   HENT:   Head: Normocephalic.   Mouth/Throat: Mucous membranes are normal.   Eyes: EOM are normal.   Neck: Normal range of motion.   Cardiovascular: Normal rate and " regular rhythm. Exam reveals no gallop and no friction rub.   No murmur heard.  Reproducible R anterior chest wall pain to palpation    Pulmonary/Chest: Effort normal. No respiratory distress. She has no wheezes. She has no rhonchi. She has no rales.   Abdominal: Soft. She exhibits no distension. Bowel sounds are absent. There is tenderness (RUQ and diffuse mild lower). There is no guarding.   Musculoskeletal: Normal range of motion. She exhibits no deformity.   Neurological: She is alert and oriented to person, place, and time. GCS score is 15.   moving all extremities, no focal deficits   Skin: Skin is warm and dry.   Psychiatric:   Calm, cooperative   Nursing note and vitals reviewed.      LAB RESULTS  Lab Results (last 24 hours)     Procedure Component Value Units Date/Time    Urinalysis With Microscopic If Indicated (No Culture) - Urine, Clean Catch [[id number]]  (Abnormal) Collected:  08/11/19 1212    Specimen:  Urine, Clean Catch Updated:  08/11/19 1237     Color, UA Yellow     Appearance, UA Clear     pH, UA 6.5     Specific Gravity, UA 1.006     Glucose, UA Negative     Ketones, UA Negative     Bilirubin, UA Negative     Blood, UA Small (1+)     Protein, UA Negative     Leuk Esterase, UA Large (3+)     Nitrite, UA Negative     Urobilinogen, UA 0.2 E.U./dL    Urinalysis, Microscopic Only - Urine, Clean Catch [[id number]]  (Abnormal) Collected:  08/11/19 1212    Specimen:  Urine, Clean Catch Updated:  08/11/19 1313     RBC, UA 3-5 /HPF      WBC, UA 13-20 /HPF      Bacteria, UA None Seen /HPF      Squamous Epithelial Cells, UA 7-12 /HPF      Hyaline Casts, UA None Seen /LPF      Methodology Manual Light Microscopy    CBC & Differential [[id number]] Collected:  08/11/19 1223    Specimen:  Blood Updated:  08/11/19 1236    Narrative:       The following orders were created for panel order CBC & Differential.  Procedure                               Abnormality         Status                     ---------                                -----------         ------                     CBC Auto Differential[[id number]]        Abnormal            Final result                 Please view results for these tests on the individual orders.    CBC Auto Differential [[id number]]  (Abnormal) Collected:  08/11/19 1223    Specimen:  Blood Updated:  08/11/19 1236     WBC 15.59 10*3/mm3      RBC 4.28 10*6/mm3      Hemoglobin 12.4 g/dL      Hematocrit 41.3 %      MCV 96.5 fL      MCH 29.0 pg      MCHC 30.0 g/dL      RDW 14.0 %      RDW-SD 49.2 fl      MPV 10.3 fL      Platelets 249 10*3/mm3      Neutrophil % 73.3 %      Lymphocyte % 16.2 %      Monocyte % 7.3 %      Eosinophil % 1.2 %      Basophil % 0.7 %      Immature Grans % 1.3 %      Neutrophils, Absolute 11.43 10*3/mm3      Lymphocytes, Absolute 2.52 10*3/mm3      Monocytes, Absolute 1.14 10*3/mm3      Eosinophils, Absolute 0.18 10*3/mm3      Basophils, Absolute 0.11 10*3/mm3      Immature Grans, Absolute 0.21 10*3/mm3      nRBC 0.1 /100 WBC     Comprehensive Metabolic Panel [[id number]]  (Abnormal) Collected:  08/11/19 1340    Specimen:  Blood Updated:  08/11/19 1415     Glucose 84 mg/dL      BUN 9 mg/dL      Creatinine 0.54 mg/dL      Sodium 135 mmol/L      Potassium 3.8 mmol/L      Chloride 98 mmol/L      CO2 23.7 mmol/L      Calcium 8.9 mg/dL      Total Protein 6.6 g/dL      Albumin 4.00 g/dL      ALT (SGPT) 17 U/L      AST (SGOT) 12 U/L      Alkaline Phosphatase 65 U/L      Total Bilirubin 0.5 mg/dL      eGFR Non African Amer 127 mL/min/1.73      Globulin 2.6 gm/dL      A/G Ratio 1.5 g/dL      BUN/Creatinine Ratio 16.7     Anion Gap 13.3 mmol/L     Narrative:       GFR Normal >60  Chronic Kidney Disease <60  Kidney Failure <15    Lipase [[id number]]  (Normal) Collected:  08/11/19 1340    Specimen:  Blood Updated:  08/11/19 1411     Lipase 19 U/L     Magnesium [[id number]]  (Normal) Collected:  08/11/19 1340    Specimen:  Blood Updated:  08/11/19 1411     Magnesium 2.0 mg/dL     Blood  Culture - Blood, Arm, Left [[id number]] Collected:  08/11/19 1637    Specimen:  Blood from Arm, Left Updated:  08/11/19 1702    Blood Culture - Blood, Hand, Right [[id number]] Collected:  08/11/19 1649    Specimen:  Blood from Hand, Right Updated:  08/11/19 1702          I ordered the above labs and reviewed the results    RADIOLOGY  XR Chest 1 View   FINDINGS: The lungs are well-expanded and clear and the heart and hilar  structures are normal. There is no acute disease or change from  07/31/2019.         CT Abdomen Pelvis With Contrast    (Results Pending)        I ordered the above noted radiological studies. Interpreted by radiologist. Discussed with radiologist (). Reviewed by me in PACS.       PROCEDURES  Procedures      PROGRESS AND CONSULTS   1214: Checked pt who is resting comfortably and in NAD. Discussed plan to obtain a CT A/P, chest XR and blood work for further evaluation.     1558: Spoke with  (radiology) who stated appearance of CT compared to CT on 8/2/19 showed slight volume reduction with no definitive walled off abscess.    1559: Rechecked pt who is resting comfortably and in NAD. Pt reports razor sharp pain in vagina is new today but abdominal pain and chest pain are similar to pain that pt felt before hematoma was evacuated. Informed pt of negative chest XR and low suspicion of cardiac etiology. Discussed plan to consult surgery. Pt understands and agrees with the plan, all questions answered.    1619: Spoke with  (surgery) who states she would like pt started on abx and admitted     1634: Spoke with  ([facility]) who agreed to admit     1649: Rechecked pt who is resting comfortably and in NAD. Informed pt of conversation with  (surgery) and plan to admit. Pt understands and agrees with the plan, all questions answered.      MEDICAL DECISION MAKING  Results were reviewed/discussed with the patient and they were also made aware of online access. Pt  also made aware that some labs, such as cultures, will not be resulted during ER visit and follow up with PMD is necessary.     MDM  Number of Diagnoses or Management Options  Abdominal pain, unspecified abdominal location:   History of evacuation of hematoma:   History of vaginal hysterectomy:   Leukocytosis, unspecified type:   Diagnosis management comments: Patient presented today for evaluation of abdominal pains, concerned that she may be having persistent complications from recent surgeries.  CT scan shows intra-abdominal and pelvic fluid that is only slightly improved from preop CT scan obtained on August 2.  No definitive localized abscess, no acute other inflammatory process noted or obstructive process noted in the abdomen or pelvis.  Labs do show a new leukocytosis of 15,000 which is elevated again, with preop levels around 16,000 had decreased afterwards.  I discussed the case with Dr. De Anda with general surgery, and she is concerned for possible infected hematoma, and recommends hospitalization for IV antibiotics and further surgical evaluation.  I did discuss the case with Dr. Chan with [facility] who will hospitalize the patient today.  Patient has been updated on the course and plan and findings and the  need for hospital stay for IV antibiotics and further surgical evaluation and potential management.       Amount and/or Complexity of Data Reviewed  Clinical lab tests: ordered and reviewed (Hemoglobin 12.4, WBC 15.59)  Tests in the radiology section of CPT®:  ordered and reviewed (XR chest negative acute, CT A/P showed showed slight volume reduction with no definitive walled off abscess as compared to CT on 8/2/19)  Discussion of test results with the performing providers: yes ( (radiology))  Decide to obtain previous medical records or to obtain history from someone other than the patient: yes  Discuss the patient with other providers: yes ( (surgery)    (LIPPS))  Independent visualization of images, tracings, or specimens: yes    Patient Progress  Patient progress: stable         DIAGNOSIS  Final diagnoses:   Abdominal pain, unspecified abdominal location   Leukocytosis, unspecified type   History of evacuation of hematoma   History of vaginal hysterectomy       DISPOSITION  ADMISSION TO [facility]    Discussed treatment plan and reason for admission with pt/family and admitting physician.  Pt/family voiced understanding of the plan for admission for further testing/treatment as needed.         Latest Documented Vital Signs:  As of 6:01 PM  BP- 117/79 HR- 78 Temp- 99.1 °F (37.3 °C) (Tympanic) O2 sat- 98%    --  Documentation assistance provided by antwon Roberson for .  Information recorded by the scribe was done at my direction and has been verified and validated by me.                         Deyanira Roberson  08/11/19 1712       Sudhir Navas MD  08/11/19 1801      Electronically signed by Sudhir Navas MD at 8/11/2019  6:01 PM       ICU Vital Signs     Row Name 08/12/19 1159 08/12/19 0900 08/12/19 0850 08/12/19 0847 08/12/19 0700       Vitals    Temp  98.6 °F (37 °C)  98.8 °F (37.1 °C)  --  --  99.4 °F (37.4 °C)    Temp src  Oral  Oral  --  --  Oral    Pulse  81  --  74  --  79    Heart Rate Source  Monitor  --  --  --  Monitor    Resp  18  --  18  --  16    Resp Rate Source  Visual  --  --  --  Visual    BP  125/85  --  98/61  --  91/55    Noninvasive MAP (mmHg)  --  --  75  --  --    BP Location  Right arm  --  Right arm  --  Right arm    BP Method  Automatic  --  Automatic  --  Automatic    Patient Position  Lying  --  Lying  --  Lying       Oxygen Therapy    SpO2  98 %  --  96 %  --  --    Pulse Oximetry Type  Continuous  --  Continuous  --  --    Device (Oxygen Therapy)  room air  --  room air  room air  room air    Row Name 08/12/19 0040 08/12/19 0021 08/11/19 2021 08/11/19 1901 08/11/19 1855       Height and Weight    Weight  --  --  --  98 kg (216  "lb 1.6 oz)  --    Weight Method  --  --  --  Standing scale  --       Vitals    Temp  --  98.9 °F (37.2 °C)  --  98.5 °F (36.9 °C)  --    Temp src  --  Oral  --  Oral  --    Pulse  --  95  --  84  --    Heart Rate Source  --  Monitor  --  Monitor  --    Resp  --  16  --  18  --    Resp Rate Source  --  Visual  --  Visual  --    BP  --  107/75  --  110/68  --    BP Location  --  Right arm  --  Right arm  --    BP Method  --  Automatic  --  Automatic  --    Patient Position  --  Sitting  --  Lying  --       Oxygen Therapy    SpO2  --  96 %  --  --  --    Pulse Oximetry Type  --  Continuous  --  --  --    Device (Oxygen Therapy)  room air  room air  room air  --  room air    Row Name 08/11/19 1702 08/11/19 15:25:40 08/11/19 1356 08/11/19 13:15:41 08/11/19 12:09:03       Vitals    Pulse  78  75  --  74  91    Heart Rate Source  --  --  --  Monitor  Monitor    Resp  18  18  18  18  18    Resp Rate Source  --  Visual  --  --  Visual    BP  117/79  124/66  119/82  115/76  126/99    BP Location  --  --  --  --  Right arm    BP Method  --  --  --  --  Automatic       Oxygen Therapy    SpO2  98 %  97 %  --  99 %  98 %    Pulse Oximetry Type  Continuous  --  Continuous  Continuous  Continuous    Device (Oxygen Therapy)  room air  --  --  --  --    Row Name 08/11/19 1159                   Height and Weight    Height  170.2 cm (67\")        Height Method  Stated        Weight  95.3 kg (210 lb)        Ideal Body Weight (IBW) (kg)  61.86        BSA (Calculated - sq m)  2.06 sq meters        BMI (Calculated)  32.9        Weight in (lb) to have BMI = 25  159.3           Vitals    Temp  99.1 °F (37.3 °C)        Temp src  Tympanic        Pulse  110        Heart Rate Source  Monitor        Resp  18        Resp Rate Source  Visual           Oxygen Therapy    SpO2  98 %        Device (Oxygen Therapy)  room air            Hospital Medications (active)       Dose Frequency Start End    acetaminophen (TYLENOL) suppository 650 mg 650 mg Once " "As Needed 8/12/2019     Sig - Route: Insert 2 suppositories into the rectum 1 (One) Time As Needed for Mild Pain . - Rectal    Linked Group 1:  \"Or\" Linked Group Details        acetaminophen (TYLENOL) suppository 650 mg 650 mg Once As Needed 8/12/2019     Sig - Route: Insert 2 suppositories into the rectum 1 (One) Time As Needed for Mild Pain . - Rectal    Linked Group 2:  \"Or\" Linked Group Details        acetaminophen (TYLENOL) tablet 650 mg 650 mg Once As Needed 8/12/2019     Sig - Route: Take 2 tablets by mouth 1 (One) Time As Needed for Mild Pain . - Oral    Linked Group 1:  \"Or\" Linked Group Details        acetaminophen (TYLENOL) tablet 650 mg 650 mg Once As Needed 8/12/2019     Sig - Route: Take 2 tablets by mouth 1 (One) Time As Needed for Mild Pain . - Oral    Linked Group 2:  \"Or\" Linked Group Details        acetaminophen-codeine (TYLENOL #3) 300-30 MG per tablet 2 tablet (MAR Hold) ((MAR Hold) since 8/12/2019 11:56 AM) 2 tablet Every 6 Hours PRN 8/11/2019 8/21/2019    Sig - Route: Take 2 tablets by mouth Every 6 (Six) Hours As Needed for Moderate Pain . - Oral    bupivacaine-EPINEPHrine (MARCAINE w/EPI) injection  As Needed 8/12/2019     Sig: As Needed.    dexamethasone (DECADRON) injection  As Needed 8/12/2019     Sig - Route: Infuse  into a venous catheter As Needed. - Intravenous    dicyclomine (BENTYL) capsule 10 mg 10 mg 4 Times Daily 8/12/2019 9/6/2019    Sig - Route: Take 1 capsule by mouth 4 (Four) Times a Day. - Oral    diphenhydrAMINE (BENADRYL) capsule 25 mg 25 mg Every 30 Minutes PRN 8/12/2019     Sig - Route: Take 1 capsule by mouth Every 30 (Thirty) Minutes As Needed for Itching (May repeat x 1). - Oral    diphenhydrAMINE (BENADRYL) capsule 25 mg 25 mg Every 6 Hours PRN 8/12/2019     Sig - Route: Take 1 capsule by mouth Every 6 (Six) Hours As Needed for Itching. - Oral    diphenhydrAMINE (BENADRYL) capsule 25 mg 25 mg Every 30 Minutes PRN 8/12/2019     Sig - Route: Take 1 capsule by mouth " Every 30 (Thirty) Minutes As Needed for Itching (May repeat x 1). - Oral    diphenhydrAMINE (BENADRYL) injection 12.5 mg 12.5 mg Every 15 Minutes PRN 8/12/2019     Sig - Route: Infuse 0.25 mL into a venous catheter Every 15 (Fifteen) Minutes As Needed for Itching (May repeat x 1). - Intravenous    docusate sodium (COLACE) capsule 100 mg (MAR Hold) ((MAR Hold) since 8/12/2019 11:56 AM) 100 mg 3 Times Daily 8/11/2019     Sig - Route: Take 1 capsule by mouth 3 (Three) Times a Day. - Oral    docusate sodium (COLACE) capsule 100 mg 100 mg 3 Times Daily 8/12/2019     Sig - Route: Take 1 capsule by mouth 3 (Three) Times a Day. - Oral    ePHEDrine injection 5 mg 5 mg Once As Needed 8/12/2019     Sig - Route: Infuse 0.1 mL into a venous catheter 1 (One) Time As Needed (symptomatic hypotension - Notify attending anesthesiologist if this needs to be given). - Intravenous    ePHEDrine injection 5 mg 5 mg Once As Needed 8/12/2019     Sig - Route: Infuse 0.1 mL into a venous catheter 1 (One) Time As Needed (symptomatic hypotension - Notify attending anesthesiologist if this needs to be given). - Intravenous    famotidine (PEPCID) injection 20 mg 20 mg Once 8/12/2019 8/12/2019    Sig - Route: Infuse 2 mL into a venous catheter 1 (One) Time. - Intravenous    fentaNYL citrate (PF) (SUBLIMAZE) injection 50 mcg 50 mcg Every 10 Minutes PRN 8/12/2019     Sig - Route: Infuse 1 mL into a venous catheter Every 10 (Ten) Minutes As Needed for Severe Pain . - Intravenous    fentaNYL citrate (PF) (SUBLIMAZE) injection 50 mcg 50 mcg Every 5 Minutes PRN 8/12/2019     Sig - Route: Infuse 1 mL into a venous catheter Every 5 (Five) Minutes As Needed for Moderate Pain  or Severe Pain . - Intravenous    fentaNYL citrate (PF) (SUBLIMAZE) injection 50 mcg 50 mcg Every 5 Minutes PRN 8/12/2019     Sig - Route: Infuse 1 mL into a venous catheter Every 5 (Five) Minutes As Needed for Moderate Pain  or Severe Pain . - Intravenous    fentaNYL citrate (PF)  (SUBLIMAZE) injection  As Needed 8/12/2019     Sig - Route: Infuse  into a venous catheter As Needed. - Intravenous    flumazenil (ROMAZICON) injection 0.2 mg 0.2 mg As Needed 8/12/2019     Sig - Route: Infuse 2 mL into a venous catheter As Needed (for benzodiazepine induced unresponsiveness or sedation). - Intravenous    flumazenil (ROMAZICON) injection 0.2 mg 0.2 mg As Needed 8/12/2019     Sig - Route: Infuse 2 mL into a venous catheter As Needed (for benzodiazepine induced unresponsiveness or sedation). - Intravenous    glycopyrrolate (ROBINUL) injection  As Needed 8/12/2019     Sig - Route: Infuse  into a venous catheter As Needed. - Intravenous    hydrALAZINE (APRESOLINE) injection 5 mg 5 mg Every 10 Minutes PRN 8/12/2019     Sig - Route: Infuse 0.25 mL into a venous catheter Every 10 (Ten) Minutes As Needed for High Blood Pressure (for systolic blood pressure greater than 180 mmHg or diastolic blood pressure greater than 105 mmHg). - Intravenous    hydrALAZINE (APRESOLINE) injection 5 mg 5 mg Every 10 Minutes PRN 8/12/2019     Sig - Route: Infuse 0.25 mL into a venous catheter Every 10 (Ten) Minutes As Needed for High Blood Pressure (for systolic blood pressure greater than 180 mmHg or diastolic blood pressure greater than 105 mmHg). - Intravenous    HYDROcodone-acetaminophen (NORCO) 7.5-325 MG per tablet 1 tablet 1 tablet Once As Needed 8/12/2019     Sig - Route: Take 1 tablet by mouth 1 (One) Time As Needed for Mild Pain . - Oral    HYDROcodone-acetaminophen (NORCO) 7.5-325 MG per tablet 1 tablet 1 tablet Once As Needed 8/12/2019     Sig - Route: Take 1 tablet by mouth 1 (One) Time As Needed for Mild Pain . - Oral    HYDROmorphone (DILAUDID) injection 0.5 mg (MAR Hold) ((MAR Hold) since 8/12/2019 11:56 AM) 0.5 mg Every 4 Hours PRN 8/11/2019 8/21/2019    Sig - Route: Infuse 0.5 mL into a venous catheter Every 4 (Four) Hours As Needed for Severe Pain . - Intravenous    HYDROmorphone (DILAUDID) injection 0.5 mg  0.5 mg Every 5 Minutes PRN 8/12/2019     Sig - Route: Infuse 0.5 mL into a venous catheter Every 5 (Five) Minutes As Needed for Moderate Pain  or Severe Pain . - Intravenous    HYDROmorphone (DILAUDID) injection 0.5 mg 0.5 mg Every 5 Minutes PRN 8/12/2019     Sig - Route: Infuse 0.5 mL into a venous catheter Every 5 (Five) Minutes As Needed for Moderate Pain  or Severe Pain . - Intravenous    HYDROmorphone (DILAUDID) injection  As Needed 8/12/2019     Sig - Route: Infuse  into a venous catheter As Needed. - Intravenous    iopamidol (ISOVUE-370) 76 % injection 100 mL 100 mL Once in Imaging 8/11/2019 8/11/2019    Sig - Route: Infuse 100 mL into a venous catheter Once. - Intravenous    labetalol (NORMODYNE,TRANDATE) injection 5 mg 5 mg Every 5 Minutes PRN 8/12/2019     Sig - Route: Infuse 1 mL into a venous catheter Every 5 (Five) Minutes As Needed for High Blood Pressure (for systolic blood pressure greater than 180 mmHg or diastolic blood pressure greater than 105 mmHg). - Intravenous    labetalol (NORMODYNE,TRANDATE) injection 5 mg 5 mg Every 5 Minutes PRN 8/12/2019     Sig - Route: Infuse 1 mL into a venous catheter Every 5 (Five) Minutes As Needed for High Blood Pressure (for systolic blood pressure greater than 180 mmHg or diastolic blood pressure greater than 105 mmHg). - Intravenous    lactated ringers infusion 75 mL/hr Continuous 8/11/2019     Sig - Route: Infuse 75 mL/hr into a venous catheter Continuous. - Intravenous    lactated ringers infusion 9 mL/hr Continuous 8/12/2019     Sig - Route: Infuse 9 mL/hr into a venous catheter Continuous. - Intravenous    lidocaine (XYLOCAINE) 2% injection  As Needed 8/12/2019     Sig - Route: Inject  as directed As Needed. - Injection    lidocaine PF 1% (XYLOCAINE) injection 0.5 mL 0.5 mL Once As Needed 8/12/2019     Sig - Route: Inject 0.5 mL as directed 1 (One) Time As Needed (IV Start). - Injection    midazolam (VERSED) injection 1 mg 1 mg Every 5 Minutes PRN 8/12/2019  "    Sig - Route: Infuse 1 mL into a venous catheter Every 5 (Five) Minutes As Needed (Mild to Moderate Anxiety). - Intravenous    Linked Group 3:  \"Or\" Linked Group Details        midazolam (VERSED) injection 2 mg 2 mg Every 5 Minutes PRN 8/12/2019     Sig - Route: Infuse 2 mL into a venous catheter Every 5 (Five) Minutes As Needed (Moderate to Severe Anxiety). - Intravenous    Linked Group 3:  \"Or\" Linked Group Details        naloxone (NARCAN) injection 0.2 mg 0.2 mg As Needed 8/12/2019     Sig - Route: Infuse 0.5 mL into a venous catheter As Needed for Opioid Reversal or Respiratory Depression (unresponsiveness, decrease oxygen saturation). - Intravenous    naloxone (NARCAN) injection 0.2 mg 0.2 mg As Needed 8/12/2019     Sig - Route: Infuse 0.5 mL into a venous catheter As Needed for Opioid Reversal or Respiratory Depression (unresponsiveness, decrease oxygen saturation). - Intravenous    neostigmine (PROSTIGMINE) injection  As Needed 8/12/2019     Sig - Route: Infuse  into a venous catheter As Needed. - Intravenous    ondansetron (ZOFRAN) injection 4 mg (MAR Hold) ((MAR Hold) since 8/12/2019 11:56 AM) 4 mg Every 4 Hours PRN 8/11/2019     Sig - Route: Infuse 2 mL into a venous catheter Every 4 (Four) Hours As Needed for Nausea or Vomiting. - Intravenous    ondansetron (ZOFRAN) injection 4 mg 4 mg Once As Needed 8/12/2019     Sig - Route: Infuse 2 mL into a venous catheter 1 (One) Time As Needed for Nausea or Vomiting. - Intravenous    ondansetron (ZOFRAN) injection 4 mg 4 mg Once As Needed 8/12/2019     Sig - Route: Infuse 2 mL into a venous catheter 1 (One) Time As Needed for Nausea or Vomiting. - Intravenous    ondansetron (ZOFRAN) injection  As Needed 8/12/2019     Sig - Route: Infuse  into a venous catheter As Needed. - Intravenous    oxyCODONE-acetaminophen (PERCOCET) 7.5-325 MG per tablet 1 tablet 1 tablet Once As Needed 8/12/2019     Sig - Route: Take 1 tablet by mouth 1 (One) Time As Needed for Moderate " "Pain . - Oral    oxyCODONE-acetaminophen (PERCOCET) 7.5-325 MG per tablet 1 tablet 1 tablet Once As Needed 8/12/2019     Sig - Route: Take 1 tablet by mouth 1 (One) Time As Needed for Moderate Pain . - Oral    pantoprazole (PROTONIX) EC tablet 40 mg (MAR Hold) ((MAR Hold) since 8/12/2019 11:56 AM) 40 mg Every Early Morning 8/12/2019     Sig - Route: Take 1 tablet by mouth Every Morning. - Oral    piperacillin-tazobactam (ZOSYN) 3.375 g in iso-osmotic dextrose 50 ml (premix) 3.375 g Once 8/11/2019 8/11/2019    Sig - Route: Infuse 50 mL into a venous catheter 1 (One) Time. - Intravenous    piperacillin-tazobactam (ZOSYN) 3.375 g in iso-osmotic dextrose 50 ml (premix) (MAR Hold) ((MAR Hold) since 8/12/2019 11:56 AM) 3.375 g Every 8 Hours 8/11/2019 8/14/2019    Sig - Route: Infuse 50 mL into a venous catheter Every 8 (Eight) Hours. - Intravenous    promethazine (PHENERGAN) injection 12.5 mg 12.5 mg Once As Needed 8/12/2019     Sig - Route: Inject 0.5 mL into the appropriate muscle as directed by prescriber 1 (One) Time As Needed for Nausea or Vomiting. - Intramuscular    Linked Group 4:  \"Or\" Linked Group Details        promethazine (PHENERGAN) injection 12.5 mg 12.5 mg Once As Needed 8/12/2019     Sig - Route: Inject 0.5 mL into the appropriate muscle as directed by prescriber 1 (One) Time As Needed for Nausea or Vomiting. - Intramuscular    Linked Group 5:  \"Or\" Linked Group Details        promethazine (PHENERGAN) injection 6.25 mg 6.25 mg Every 10 Minutes PRN 8/12/2019     Sig - Route: Infuse 0.25 mL into a venous catheter Every 10 (Ten) Minutes As Needed for Nausea or Vomiting. - Intravenous    Linked Group 4:  \"Or\" Linked Group Details        promethazine (PHENERGAN) injection 6.25 mg 6.25 mg Every 10 Minutes PRN 8/12/2019     Sig - Route: Infuse 0.25 mL into a venous catheter Every 10 (Ten) Minutes As Needed for Nausea or Vomiting. - Intravenous    Linked Group 5:  \"Or\" Linked Group Details        promethazine " "(PHENERGAN) suppository 25 mg 25 mg Once As Needed 8/12/2019     Sig - Route: Insert 1 suppository into the rectum 1 (One) Time As Needed for Nausea or Vomiting. - Rectal    Linked Group 4:  \"Or\" Linked Group Details        promethazine (PHENERGAN) suppository 25 mg 25 mg Once As Needed 8/12/2019     Sig - Route: Insert 1 suppository into the rectum 1 (One) Time As Needed for Nausea or Vomiting. - Rectal    Linked Group 5:  \"Or\" Linked Group Details        promethazine (PHENERGAN) tablet 25 mg 25 mg Once As Needed 8/12/2019     Sig - Route: Take 1 tablet by mouth 1 (One) Time As Needed for Nausea or Vomiting. - Oral    Linked Group 4:  \"Or\" Linked Group Details        promethazine (PHENERGAN) tablet 25 mg 25 mg Once As Needed 8/12/2019     Sig - Route: Take 1 tablet by mouth 1 (One) Time As Needed for Nausea or Vomiting. - Oral    Linked Group 5:  \"Or\" Linked Group Details        Propofol (DIPRIVAN) injection  As Needed 8/12/2019     Sig - Route: Infuse  into a venous catheter As Needed. - Intravenous    rocuronium (ZEMURON) injection  As Needed 8/12/2019     Sig - Route: Infuse  into a venous catheter As Needed. - Intravenous    sodium chloride (NS) irrigation solution  As Needed 8/12/2019     Sig: As Needed.    sodium chloride 0.9 % flush 1-10 mL 1-10 mL As Needed 8/12/2019     Sig - Route: Infuse 1-10 mL into a venous catheter As Needed for Line Care. - Intravenous    sodium chloride 0.9 % solution  As Needed 8/12/2019     Sig: As Needed.    vancomycin 2000 mg/500 mL 0.9% NS IVPB ([facility]) 20 mg/kg × 95.3 kg Once 8/11/2019 8/11/2019    Sig - Route: Infuse 500 mL into a venous catheter 1 (One) Time. - Intravenous            Lab Results (last 24 hours)     Procedure Component Value Units Date/Time    Body Fluid Culture - Body Fluid, Pelvis [[id number]] Collected:  08/12/19 1514    Specimen:  Body Fluid from Pelvis Updated:  08/12/19 1523    Basic Metabolic Panel [[id number]]  (Abnormal) Collected:  08/12/19 0625    " Specimen:  Blood Updated:  08/12/19 0724     Glucose 88 mg/dL      BUN 5 mg/dL      Creatinine 0.54 mg/dL      Sodium 140 mmol/L      Potassium 3.9 mmol/L      Chloride 105 mmol/L      CO2 22.8 mmol/L      Calcium 8.4 mg/dL      eGFR Non African Amer 127 mL/min/1.73      BUN/Creatinine Ratio 9.3     Anion Gap 12.2 mmol/L     Narrative:       GFR Normal >60  Chronic Kidney Disease <60  Kidney Failure <15    CBC & Differential [[id number]] Collected:  08/12/19 0625    Specimen:  Blood Updated:  08/12/19 0705    Narrative:       The following orders were created for panel order CBC & Differential.  Procedure                               Abnormality         Status                     ---------                               -----------         ------                     CBC Auto Differential[[id number]]        Abnormal            Final result                 Please view results for these tests on the individual orders.    CBC Auto Differential [[id number]]  (Abnormal) Collected:  08/12/19 0625    Specimen:  Blood Updated:  08/12/19 0705     WBC 10.39 10*3/mm3      RBC 3.67 10*6/mm3      Hemoglobin 10.7 g/dL      Hematocrit 34.3 %      MCV 93.5 fL      MCH 29.2 pg      MCHC 31.2 g/dL      RDW 13.5 %      RDW-SD 46.0 fl      MPV 10.7 fL      Platelets 269 10*3/mm3      Neutrophil % 74.2 %      Lymphocyte % 15.2 %      Monocyte % 7.1 %      Eosinophil % 2.0 %      Basophil % 0.4 %      Immature Grans % 1.1 %      Neutrophils, Absolute 7.71 10*3/mm3      Lymphocytes, Absolute 1.58 10*3/mm3      Monocytes, Absolute 0.74 10*3/mm3      Eosinophils, Absolute 0.21 10*3/mm3      Basophils, Absolute 0.04 10*3/mm3      Immature Grans, Absolute 0.11 10*3/mm3      nRBC 0.0 /100 WBC     Blood Culture - Blood, Arm, Left [[id number]] Collected:  08/11/19 1637    Specimen:  Blood from Arm, Left Updated:  08/11/19 1702    Blood Culture - Blood, Hand, Right [[id number]] Collected:  08/11/19 1649    Specimen:  Blood from Hand, Right Updated:   08/11/19 1702        Imaging Results (last 24 hours)     Procedure Component Value Units Date/Time    CT Angiogram Chest With & Without Contrast [[id number]] Collected:  08/12/19 0632     Updated:  08/12/19 0632    Narrative:       CT ANGIOGRAM THORAX WITH CONTRAST, PULMONARY EMBOLISM PROTOCOL     HISTORY: Pulmonary embolism.     COMPARISON: None.     TECHNIQUE: Radiation dose reduction techniques were utilized, including  automated exposure control and exposure modulation based on body size.  Axial contrast-enhanced images of the chest were obtained according to  the pulmonary embolism protocol. Coronal oblique 3-D MIP reformatted  images were supplemented and reviewed.  100 mls of non ionic contrast  was utilized intravenously.     FINDINGS CHEST CT: Lungs well inflated and clear. There are very small  pleural effusions present. Unfortunately, IV contrast bolus is  suboptimal for pulmonary embolism assessment. There is no central or  large proximal embolus demonstrated. Branch vessel assessment is  limited. Aorta nonaneurysmal. 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 Visualized upper abdomen shows a small amount of  ascites mostly about the liver surface.             Impression:       1. Small pleural effusions.  2. Suboptimal IV contrast bolus but no central or large proximal PE  demonstrated.  3. Small amount of ascites.          CT Abdomen Pelvis With Contrast [[id number]] Collected:  08/11/19 1644     Updated:  08/11/19 1651    Narrative:       CT SCAN OF THE ABDOMEN AND PELVIS WITH INTRAVENOUS CONTRAST     HISTORY: Right upper quadrant and right lower quadrant pain. Recent  vaginal hysterectomy.     The CT scan was performed as an emergency procedure through the abdomen  and pelvis with intravenous contrast and compared to the previous  postoperative CT scan of the abdomen and pelvis performed 9 days ago on  08/02/2019. The following findings are present:  1. The previous CT scan  demonstrated some hyperdense free fluid adjacent  to the liver and extending into the pelvis and consistent with  postoperative hematoma. There is some residual lower density free fluid  adjacent to the liver and in both colic gutters and extending into the  deep pelvis on today's exam. No walled off or air-containing focal  abscess is identified. The overall volume of fluid is somewhat less on  today's exam. There is what appears to be a left ovarian cyst measuring  3.7 cm compared to 2.6 cm on the previous exam.  2. There is some effacement of the fat planes in the deep pelvis  consistent with the recent surgery. The large and small bowel loops are  normal in caliber. The liver, spleen, pancreas, both adrenal glands, and  both kidneys are unremarkable. The gallbladder shows no gallstones or  wall thickening. The lung bases are clear.           Radiation dose reduction techniques were utilized, including automated  exposure control and exposure modulation based on body size.     This report was finalized on 8/11/2019 4:48 PM by Dr. Alex Andres M.D.       XR Chest 1 View [[id number]] Collected:  08/11/19 1614     Updated:  08/11/19 1632    Narrative:       ONE VIEW PORTABLE CHEST     HISTORY: Chest wall pain.     FINDINGS: The lungs are well-expanded and clear and the heart and hilar  structures are normal. There is no acute disease or change from  07/31/2019.     This report was finalized on 8/11/2019 4:29 PM by Dr. Alex Andres M.D.           Operative/Procedure Notes (all)     No notes of this type exist for this encounter.           Physician Progress Notes (last 48 hours) (Notes from 8/10/2019  4:16 PM through 8/12/2019  4:16 PM)      Dru Pena MD at 8/12/2019 12:54 PM                                 GYN Rounds      SUBJECTIVE:  Patient appears comfortable, she is in preop holding.  She is postop day 14 from a vaginal hysterectomy anterior repair with enterocele repair and bilateral salpingectomy.   "She is 8 days out from an evacuation of a pelvic hematoma.  She was readmitted yesterday with chest pain, right sided upper abdominal rib discomfort and having a history of pain with bowel movements.  She was voiding, she had multiple bowel movements .  She has not had any vaginal bleeding or fever or dysuria.  She has not had any emesis.  CT findings yesterday were positive for fluid in the colic gutters as well as in the deep pelvis.  There was no evidence of loculation or abscess.  Large and small bowel loops were described as normal in caliper,  lungs revealed very small pleural effusions , and there was no evidence of gallstones.  Today's white count is 10, hemoglobin and hematocrit are 10.7 and 34.      OBJECTIVE:  Vital Signs: /85 (BP Location: Right arm, Patient Position: Lying)   Pulse 81   Temp 98.6 °F (37 °C) (Oral)   Resp 18   Ht 170.2 cm (67\")   Wt 98 kg (216 lb 1.6 oz)   LMP 07/22/2019 (Exact Date)   SpO2 98%   BMI 33.85 kg/m²      Intake/Output Summary (Last 24 hours) at 8/12/2019 1255  Last data filed at 8/12/2019 1051  Gross per 24 hour   Intake 768.8 ml   Output 1450 ml   Net -681.2 ml       Constitutional: The patient is well nourished.  Cardiovascular:RRR  Resp: nonlabored breathing, no rhonchi rales  Abdomen.  Soft nontender normal bowel sounds are heard in all 4 quadrants.  Steri-Strips are still applied to the laparoscopy incisions and they seem to be healing well with appropriate amount of light bruising seen.  Pelvic-no vaginal bleeding and exam under anesthesia will be repeated this afternoon  Extremities:no edema, normal  and non tender    LABS / IMAGING:  As described above    ASSESSMENT AND PLAN:    As outlined above, patient is 13 days out from a vaginitis ANP repair, enterocele repair, bilateral salpingectomy.  She is 8 days out from evacuation of a pelvic hematoma and cauterization and application of clips to the residual fallopian tube sites.  She appears to have a " continued collection of fluid in the colic gutters as well as in the deep pelvis.  She is afebrile, white count is normal, and CT scan shows normal gallbladder, kidneys, small and large intestinal loops.  General surgery has recommended returning to the OR to evacuate the residual fluid/blod and assess if there is any other etiology for her abdominal discomfort.  Throughout the course of this morning I have had 4 separate conversations with patient and her  regarding the risk and benefits of proposed surgery.  They have voiced understanding . They would like to proceed.  I will be assisting Dr. De Anda this afternoon.  Additional time in the hospital was also discussed.           Dru Pena MD    2019  12:55 PM      Electronically signed by Dru Pena MD at 2019  1:06 PM          Consult Notes (last 48 hours) (Notes from 8/10/2019  4:16 PM through 2019  4:16 PM)      Faith Briggs MD at 2019 10:06 PM              Referring Provider: Neetu  Reason for Consultation: Postoperative complication    Patient Care Team:  Issac Chavez MD as PCP - General (Internal Medicine)  Provider, No Known as PCP - Family Medicine    Chief complaint pelvic and abdominal pain, low grade fever    Subjective .     History of present illness:    38 yo  who is 12 days post vaginal hysterectomy and 8 days postoperative from laparoscopy and evacuation of pelvic hematoma.  Since discharge from second surgery she has had painful bowel movements and diarrhea and now worsening abdominal and right sided pelvic and chest pain.  Diarrhea resolved but still pain with bowel movements.  Low grade fever at home.  No dysuria, no vaginal bleeding, no foul discharge.  No N/V.    Seen in ER and CT scan shows fluid in pelvis to the right but less than on previous CT.  Since her hemoglobin is normal this is likely residual fluid and less likely a new hematoma.    Of note pt has only taken a few tylenol #3  "this whole week.      Review of Systems  Pertinent items are noted in HPI   No SOB,     History  Past Medical History:   Diagnosis Date   • History of benign schwannoma     Involving lumbar spine low the level of L-2 Dr. Chencho Skelton mentioned in  that it was \"reaching out to us\".   • History of Papanicolaou smear of cervix     Pap smear Hx: \" No history of abnormal Pap smears before \".   Pap smear Hx: 2009, '10, Normal yearly Paps.  , ASCUS, Neg HR-HPV.  , Neg Pap, Neg HR-HPV.  , Neg Paps.  , Neg Pap, Neg HR-HPV.   • LGA (large for gestational age) fetus affecting management of mother 2017   • Posterior vaginal wall prolapse    •  labor in third trimester    • Rubella non-immune status, antepartum 2017   • Vaginal delivery     x5   Angle   - Marti  -   Kevin  -    Cece -      ,   Past Surgical History:   Procedure Laterality Date   • ANTERIOR AND POSTERIOR VAGINAL REPAIR N/A 2019    Procedure: ANTERIOR AND POSTERIOR VAGINAL REPAIR;  Surgeon: Dru Pena MD;  Location: [facility] OR [facility] [place];  Service: Obstetrics/Gynecology   • DENTAL PROCEDURE      IMPLANT   • DIAGNOSTIC LAPAROSCOPY N/A 8/3/2019    Procedure: DIAGNOSTIC LAPAROSCOPY;  Surgeon: Travis Trammell Jr., MD;  Location: [facility] OR;  Service: General   • VAGINAL HYSTERECTOMY N/A 2019    Procedure: VAGINAL HYSTERECTOMY;  Surgeon: Dru Pena MD;  Location: [facility] OR [facility] [place];  Service: Obstetrics/Gynecology   • WISDOM TOOTH EXTRACTION     ,   Family History   Problem Relation Age of Onset   • No Known Problems Daughter    • No Known Problems Daughter    • No Known Problems Daughter    • No Known Problems Son    • Coronary artery disease Mother    • Malig Hyperthermia Neg Hx    ,   Social History     Tobacco Use   • Smoking status: Never Smoker   • Smokeless tobacco: Never Used   Substance Use Topics   • Alcohol use: No   • Drug use: No   ,   Medications Prior to Admission "   Medication Sig Dispense Refill Last Dose   • dicyclomine (BENTYL) 10 MG capsule Take 1 capsule by mouth 4 (Four) Times a Day for 30 days. 120 capsule 0 8/8/2019 at Unknown time   • docusate sodium (COLACE) 100 MG capsule Take 100 mg by mouth 3 (Three) Times a Day.   8/11/2019 at Unknown time    and Allergies:  Patient has no known allergies.    Objective     Vital Signs   Temp:  [98.5 °F (36.9 °C)-99.1 °F (37.3 °C)] 98.5 °F (36.9 °C)  Heart Rate:  [] 84  Resp:  [18] 18  BP: (110-126)/(66-99) 110/68    Physical Exam:   General Appearance: alert, appears stated age and cooperative  Lungs: respirations regular, respirations even and respirations unlabored  Heart: regular rhythm & normal rate  Abdomen: normal bowel sounds, no masses, soft non-tender, no guarding, no rebound tenderness and incisions CDI and no erythema  Pelvic: Exam deferred  Extremities: moves extremities well, no edema, no cyanosis and no redness  Neurologic: Mental Status orientated to person, place, time and situation, Speech normal content and execusion  Psych: normal    Results Review:   I reviewed the patient's new clinical results.      Assessment/Plan       Abdominal pain      I agree with admission and antibiotics and serial hemoglobin.  It seems likely that residual fluid in pelvis may be a source of surgical site infection which could be causing her symptoms and elevation of WBC ct. No definitive abscess seen.     I discussed the patients findings and my recommendations with patient    Faith Briggs MD  08/11/19  10:16 PM    Time: 30 minutes        Electronically signed by Faith Briggs MD at 8/11/2019 10:21 PM     Sybil De Anda MD at 8/11/2019  6:42 PM          CC:  Abdominal pain    HPI    Patient is a 39 y.o. female who underwent a hysterectomy vaginally by Dr Pena 7/31/19, then developed a bleed and was taken back by Dr Trammell and Dr Rendon 8/3/19 for evacuation of hematoma and control of bleeding.  This appeared to be at  "the fallopian tubes and was treated by cauterization and clips.  Comments were made that every attempt was made to remove as much clot as possible.  This leaves me to infer that there was possibly some remaining along with irrigation.  She was discharged on 19.  She received at least 4 units of blood.    She comes in tonight with complaints of pain right upper quadrant pain and right chest pain.  Also, she describes excruciating pain in her pelvis when she has a bowel movement.  She is on colace tid to maintain a soft stool but this has continued.  She has had low grade fever.  The pain was no worse today, only so persistent that she didn't think she could wait for her visit with Dr Pena tomorrow.  While in the ER, Her WBC was 15 and hgb 12.4 vs 9.5 on last check prior to discharge.  Temp was 99.1 in the ER.  She notes that they told her in the ER that would notify Dr Pena's office so that they would know tomorrow.    CXR shows good lung expansion.  Patient also expresses difficulty taking a deep breath, a sense of shortness of breath and right sided chest pain.      CT abdo shows fluid in the pelvis and along the right paracolic gutter, but less than on CT prior to her evacuation.  There is no obvious abscess.  Past Medical History:   Diagnosis Date   • History of benign schwannoma     Involving lumbar spine low the level of L-2 Dr. Chencho Skelton mentioned in  that it was \"reaching out to us\".   • History of Papanicolaou smear of cervix     Pap smear Hx: \" No history of abnormal Pap smears before \".   Pap smear Hx: , '10, Normal yearly Paps.  , ASCUS, Neg HR-HPV.  , Neg Pap, Neg HR-HPV.  , ', Neg Paps.  , Neg Pap, Neg HR-HPV.   • LGA (large for gestational age) fetus affecting management of mother 2017   • Posterior vaginal wall prolapse    •  labor in third trimester    • Rubella non-immune status, antepartum 2017   • Vaginal delivery     x5   Angle  "  -2010 Marti  -  2014 Kevin  -  2016  Cece -   2017     Past Surgical History:   Procedure Laterality Date   • ANTERIOR AND POSTERIOR VAGINAL REPAIR N/A 7/31/2019    Procedure: ANTERIOR AND POSTERIOR VAGINAL REPAIR;  Surgeon: Dru Pena MD;  Location: [facility] OR [facility] [place];  Service: Obstetrics/Gynecology   • DENTAL PROCEDURE      IMPLANT   • DIAGNOSTIC LAPAROSCOPY N/A 8/3/2019    Procedure: DIAGNOSTIC LAPAROSCOPY;  Surgeon: Travis Trammell Jr., MD;  Location: [facility] MAIN OR;  Service: General   • VAGINAL HYSTERECTOMY N/A 7/31/2019    Procedure: VAGINAL HYSTERECTOMY;  Surgeon: Dru Pena MD;  Location: [facility] OR [facility] [place];  Service: Obstetrics/Gynecology   • WISDOM TOOTH EXTRACTION       Family History   Problem Relation Age of Onset   • No Known Problems Daughter    • No Known Problems Daughter    • No Known Problems Daughter    • No Known Problems Son    • Coronary artery disease Mother    • Malig Hyperthermia Neg Hx      Social History     Tobacco Use   • Smoking status: Never Smoker   • Smokeless tobacco: Never Used   Substance Use Topics   • Alcohol use: No   • Drug use: No     No current facility-administered medications for this encounter.     Current Outpatient Medications:   •  dicyclomine (BENTYL) 10 MG capsule, Take 1 capsule by mouth 4 (Four) Times a Day for 30 days., Disp: 120 capsule, Rfl: 0    No Known Allergies    Vitals:    08/11/19 1315 08/11/19 1356 08/11/19 1525 08/11/19 1702   BP: 115/76 119/82 124/66 117/79   BP Location:       Pulse: 74  75 78   Resp: 18 18 18 18   Temp:       TempSrc:       SpO2: 99%  97% 98%   Weight:       Height:         Body mass index is 32.89 kg/m².    Physical Exam   Constitutional: She appears well-developed and well-nourished.   Sitting up in a chair   HENT:   Head: Normocephalic and atraumatic.   Cardiovascular: Normal rate.   Pulmonary/Chest: Effort normal.   Abdominal: Soft. Distention: right lower quadrant and right upper quadrant. There is tenderness. There is no  rebound. No hernia.   Neurological: She is alert.   Psychiatric: She has a normal mood and affect.   Vitals reviewed.      IMPRESSION:  · Intraabdominal fluid after vaginal hysterectomy with bleed and subsequent laparoscopic evacuation with control of bleed.  I doubt seriously that she is currently bleeding.  It's most likely this is residual fluid.  My only question is as to whether it has become infected.  · Shortness of breath with right sided chest pain.    PLAN:  · Follow H and H  · Notify Dr Pena's group.  I feel like they would want to know that she is back in the hospital and see her since they did her first operation and actually did the cauterization of the bleed at the second.  Will make sure that they are notified tonight.  · CBC in AM  · Abx due to suspicion of possible infectionl  · NPO after midnight.  · Dr Trammell to see in AM and Dr Pena.  Anticipate one of his partners will see tonight.  · CT angio to rule out PE.  · LR at 150 to hydrate to 2 CTs today.    Sybil De Anda MD  08/11/19  9:14 PM    Sunday      Electronically signed by Sybil De Anda MD at 8/11/2019  9:14 PM

## 2019-08-12 NOTE — PROGRESS NOTES
"                       GYN Rounds      SUBJECTIVE:  Patient appears comfortable, she is in preop holding.  She is postop day 14 from a vaginal hysterectomy anterior repair with enterocele repair and bilateral salpingectomy.  She is 8 days out from an evacuation of a pelvic hematoma.  She was readmitted yesterday with chest pain, right sided upper abdominal rib discomfort and having a history of pain with bowel movements.  She was voiding, she had multiple bowel movements .  She has not had any vaginal bleeding or fever or dysuria.  She has not had any emesis.  CT findings yesterday were positive for fluid in the colic gutters as well as in the deep pelvis.  There was no evidence of loculation or abscess.  Large and small bowel loops were described as normal in caliper,  lungs revealed very small pleural effusions , and there was no evidence of gallstones.  Today's white count is 10, hemoglobin and hematocrit are 10.7 and 34.      OBJECTIVE:  Vital Signs: /85 (BP Location: Right arm, Patient Position: Lying)   Pulse 81   Temp 98.6 °F (37 °C) (Oral)   Resp 18   Ht 170.2 cm (67\")   Wt 98 kg (216 lb 1.6 oz)   LMP 07/22/2019 (Exact Date)   SpO2 98%   BMI 33.85 kg/m²     Intake/Output Summary (Last 24 hours) at 8/12/2019 1255  Last data filed at 8/12/2019 1051  Gross per 24 hour   Intake 768.8 ml   Output 1450 ml   Net -681.2 ml       Constitutional: The patient is well nourished.  Cardiovascular:RRR  Resp: nonlabored breathing, no rhonchi rales  Abdomen.  Soft nontender normal bowel sounds are heard in all 4 quadrants.  Steri-Strips are still applied to the laparoscopy incisions and they seem to be healing well with appropriate amount of light bruising seen.  Pelvic-no vaginal bleeding and exam under anesthesia will be repeated this afternoon  Extremities:no edema, normal  and non tender    LABS / IMAGING:  As described above    ASSESSMENT AND PLAN:    As outlined above, patient is 13 days out from a " vaginitis ANP repair, enterocele repair, bilateral salpingectomy.  She is 8 days out from evacuation of a pelvic hematoma and cauterization and application of clips to the residual fallopian tube sites.  She appears to have a continued collection of fluid in the colic gutters as well as in the deep pelvis.  She is afebrile, white count is normal, and CT scan shows normal gallbladder, kidneys, small and large intestinal loops.  General surgery has recommended returning to the OR to evacuate the residual fluid/blod and assess if there is any other etiology for her abdominal discomfort.  Throughout the course of this morning I have had 4 separate conversations with patient and her  regarding the risk and benefits of proposed surgery.  They have voiced understanding . They would like to proceed.  I will be assisting Dr. De Anda this afternoon.  Additional time in the hospital was also discussed.           Dru Pena MD    8/12/2019  12:55 PM

## 2019-08-13 VITALS
OXYGEN SATURATION: 98 % | HEIGHT: 67 IN | RESPIRATION RATE: 16 BRPM | HEART RATE: 87 BPM | BODY MASS INDEX: 33.92 KG/M2 | SYSTOLIC BLOOD PRESSURE: 101 MMHG | TEMPERATURE: 98.9 F | DIASTOLIC BLOOD PRESSURE: 67 MMHG | WEIGHT: 216.1 LBS

## 2019-08-13 LAB
ALBUMIN SERPL-MCNC: 2.9 G/DL (ref 3.5–5.2)
ALBUMIN/GLOB SERPL: 1.1 G/DL
ALP SERPL-CCNC: 78 U/L (ref 39–117)
ALT SERPL W P-5'-P-CCNC: 24 U/L (ref 1–33)
ANION GAP SERPL CALCULATED.3IONS-SCNC: 10.3 MMOL/L (ref 5–15)
AST SERPL-CCNC: 14 U/L (ref 1–32)
BASOPHILS # BLD AUTO: 0.02 10*3/MM3 (ref 0–0.2)
BASOPHILS NFR BLD AUTO: 0.2 % (ref 0–1.5)
BILIRUB SERPL-MCNC: 0.3 MG/DL (ref 0.2–1.2)
BUN BLD-MCNC: 4 MG/DL (ref 6–20)
BUN/CREAT SERPL: 7.5 (ref 7–25)
CALCIUM SPEC-SCNC: 8.5 MG/DL (ref 8.6–10.5)
CHLORIDE SERPL-SCNC: 101 MMOL/L (ref 98–107)
CHOLEST SERPL-MCNC: 131 MG/DL (ref 0–200)
CO2 SERPL-SCNC: 25.7 MMOL/L (ref 22–29)
CREAT BLD-MCNC: 0.53 MG/DL (ref 0.57–1)
DEPRECATED RDW RBC AUTO: 45.1 FL (ref 37–54)
EOSINOPHIL # BLD AUTO: 0.07 10*3/MM3 (ref 0–0.4)
EOSINOPHIL NFR BLD AUTO: 0.8 % (ref 0.3–6.2)
ERYTHROCYTE [DISTWIDTH] IN BLOOD BY AUTOMATED COUNT: 13.5 % (ref 12.3–15.4)
GFR SERPL CREATININE-BSD FRML MDRD: 130 ML/MIN/1.73
GLOBULIN UR ELPH-MCNC: 2.6 GM/DL
GLUCOSE BLD-MCNC: 106 MG/DL (ref 65–99)
HBA1C MFR BLD: 4.8 % (ref 4.8–5.6)
HCT VFR BLD AUTO: 31.6 % (ref 34–46.6)
HDLC SERPL-MCNC: 41 MG/DL (ref 40–60)
HGB BLD-MCNC: 9.9 G/DL (ref 12–15.9)
IMM GRANULOCYTES # BLD AUTO: 0.06 10*3/MM3 (ref 0–0.05)
IMM GRANULOCYTES NFR BLD AUTO: 0.7 % (ref 0–0.5)
LDLC SERPL CALC-MCNC: 71 MG/DL (ref 0–100)
LDLC/HDLC SERPL: 1.72 {RATIO}
LYMPHOCYTES # BLD AUTO: 1.26 10*3/MM3 (ref 0.7–3.1)
LYMPHOCYTES NFR BLD AUTO: 14.5 % (ref 19.6–45.3)
MCH RBC QN AUTO: 28.8 PG (ref 26.6–33)
MCHC RBC AUTO-ENTMCNC: 31.3 G/DL (ref 31.5–35.7)
MCV RBC AUTO: 91.9 FL (ref 79–97)
MONOCYTES # BLD AUTO: 0.51 10*3/MM3 (ref 0.1–0.9)
MONOCYTES NFR BLD AUTO: 5.9 % (ref 5–12)
NEUTROPHILS # BLD AUTO: 6.79 10*3/MM3 (ref 1.7–7)
NEUTROPHILS NFR BLD AUTO: 77.9 % (ref 42.7–76)
NRBC BLD AUTO-RTO: 0 /100 WBC (ref 0–0.2)
NT-PROBNP SERPL-MCNC: 243.7 PG/ML (ref 5–450)
PLATELET # BLD AUTO: 284 10*3/MM3 (ref 140–450)
PMV BLD AUTO: 11 FL (ref 6–12)
POTASSIUM BLD-SCNC: 3.9 MMOL/L (ref 3.5–5.2)
PROT SERPL-MCNC: 5.5 G/DL (ref 6–8.5)
RBC # BLD AUTO: 3.44 10*6/MM3 (ref 3.77–5.28)
SODIUM BLD-SCNC: 137 MMOL/L (ref 136–145)
TRIGL SERPL-MCNC: 97 MG/DL (ref 0–150)
TSH SERPL DL<=0.05 MIU/L-ACNC: 0.67 MIU/ML (ref 0.27–4.2)
VLDLC SERPL-MCNC: 19.4 MG/DL (ref 5–40)
WBC NRBC COR # BLD: 8.71 10*3/MM3 (ref 3.4–10.8)

## 2019-08-13 PROCEDURE — 25010000002 HYDROMORPHONE PER 4 MG: Performed by: HOSPITALIST

## 2019-08-13 PROCEDURE — 83036 HEMOGLOBIN GLYCOSYLATED A1C: CPT | Performed by: HOSPITALIST

## 2019-08-13 PROCEDURE — 80053 COMPREHEN METABOLIC PANEL: CPT | Performed by: HOSPITALIST

## 2019-08-13 PROCEDURE — 84443 ASSAY THYROID STIM HORMONE: CPT | Performed by: HOSPITALIST

## 2019-08-13 PROCEDURE — 85025 COMPLETE CBC W/AUTO DIFF WBC: CPT | Performed by: HOSPITALIST

## 2019-08-13 PROCEDURE — 83880 ASSAY OF NATRIURETIC PEPTIDE: CPT | Performed by: HOSPITALIST

## 2019-08-13 PROCEDURE — 80061 LIPID PANEL: CPT | Performed by: HOSPITALIST

## 2019-08-13 PROCEDURE — 25010000002 PIPERACILLIN SOD-TAZOBACTAM PER 1 G: Performed by: HOSPITALIST

## 2019-08-13 RX ORDER — IBUPROFEN 400 MG/1
400 TABLET ORAL EVERY 6 HOURS PRN
Status: DISCONTINUED | OUTPATIENT
Start: 2019-08-13 | End: 2019-08-13 | Stop reason: HOSPADM

## 2019-08-13 RX ORDER — SIMETHICONE 80 MG
80 TABLET,CHEWABLE ORAL 4 TIMES DAILY PRN
Status: DISCONTINUED | OUTPATIENT
Start: 2019-08-13 | End: 2019-08-13 | Stop reason: HOSPADM

## 2019-08-13 RX ADMIN — IBUPROFEN 400 MG: 400 TABLET, FILM COATED ORAL at 10:49

## 2019-08-13 RX ADMIN — DICYCLOMINE HYDROCHLORIDE 10 MG: 10 CAPSULE ORAL at 07:56

## 2019-08-13 RX ADMIN — TAZOBACTAM SODIUM AND PIPERACILLIN SODIUM 3.38 G: 375; 3 INJECTION, SOLUTION INTRAVENOUS at 06:29

## 2019-08-13 RX ADMIN — DOCUSATE SODIUM 100 MG: 100 CAPSULE, LIQUID FILLED ORAL at 08:09

## 2019-08-13 RX ADMIN — PANTOPRAZOLE SODIUM 40 MG: 40 TABLET, DELAYED RELEASE ORAL at 06:29

## 2019-08-13 RX ADMIN — HYDROMORPHONE HYDROCHLORIDE 0.5 MG: 1 INJECTION, SOLUTION INTRAMUSCULAR; INTRAVENOUS; SUBCUTANEOUS at 04:17

## 2019-08-13 RX ADMIN — SODIUM CHLORIDE, POTASSIUM CHLORIDE, SODIUM LACTATE AND CALCIUM CHLORIDE 50 ML/HR: 600; 310; 30; 20 INJECTION, SOLUTION INTRAVENOUS at 03:58

## 2019-08-13 NOTE — NURSING NOTE
Spoke with Dr De Anda on the phone and she is ok with patient leaving today. Follow up in 1-3 weeks.

## 2019-08-13 NOTE — PAYOR COMM NOTE
"Edu Hernandez (39 y.o. Female)     ATTN: TRINA   REF#[id number]    D/C SUMMARY  THANKS!  EDUARDO VALDEZ@[phone] OR [phone]      Date of Birth Social Security Number Address Home Phone MRN    1980  [id number] [place] [address] [phone] [id number]    Quaker Marital Status          Restorationist        Admission Date Admission Type Admitting Provider Attending Provider Department, Room/Bed    8/11/19 Emergency Tino Chan MD  [address], E455/1    Discharge Date Discharge Disposition Discharge Destination        8/13/2019 Home or Self Care              Attending Provider:  (none)   Allergies:  No Known Allergies    Isolation:  None   Infection:  None   Code Status:  CPR    Ht:  170.2 cm (67\")   Wt:  98 kg (216 lb 1.6 oz)    Admission Cmt:  None   Principal Problem:  None                Active Insurance as of 8/11/2019     Primary Coverage     Payor Plan Insurance Group Employer/Plan Group    [employer] [facility] [place] [id number]     Payor Plan Address Payor Plan Phone Number Payor Plan Fax Number Effective Dates    PO    4/23/2019 - None Entered    [facility] [id number]       Subscriber Name Subscriber Birth Date Member ID       EDU HERNANDEZ 1980 [id number]                 Emergency Contacts      (Rel.) Home Phone Work Phone Mobile Phone    Issac Hernandez (Spouse) [phone] -- [phone]               Discharge Summary      Tino Chan MD at 8/13/2019  1:31 PM        Discharge summary    Date of admission 8/11/2019  Date of discharge 8/13/2019    Final diagnosis  Abdominal pain with retained fluid  Status post laparoscopic evacuation of fluid and appendectomy and adhesiolysis and flex sigmoidoscopy  Status post vaginal hysterectomy  Status post abdominal hematoma evacuation  Postop anemia  History of posterior vaginal wall prolapse  History of benign schwannoma    Discharge medications    Current " Facility-Administered Medications:   •  acetaminophen-codeine (TYLENOL #3) 300-30 MG per tablet 2 tablet, 2 tablet, Oral, Q6H PRN, Faith Briggs MD, 2 tablet at 08/12/19 2228  •  dicyclomine (BENTYL) capsule 10 mg, 10 mg, Oral, 4x Daily, Kathe Chan MD, 10 mg at 08/13/19 0756  •  docusate sodium (COLACE) capsule 100 mg, 100 mg, Oral, TID, Kathe Chan MD, 100 mg at 08/13/19 0809  •  HYDROcodone-acetaminophen (NORCO) 5-325 MG per tablet 1 tablet, 1 tablet, Oral, Q6H PRN, Sybil De Anda MD  •  ibuprofen (ADVIL,MOTRIN) tablet 400 mg, 400 mg, Oral, Q6H PRN, Sybil De Anda MD, 400 mg at 08/13/19 1049  •  ondansetron (ZOFRAN) injection 4 mg, 4 mg, Intravenous, Q4H PRN, Kathe Chan MD  •  pantoprazole (PROTONIX) EC tablet 40 mg, 40 mg, Oral, Q AM, Kathe Chan MD, 40 mg at 08/13/19 0629  •  simethicone (MYLICON) chewable tablet 80 mg, 80 mg, Oral, 4x Daily PRN, Dru Pena MD     Consults obtained  General surgery  OB/GYN    Procedures  Laparoscopic evacuation of fluid and appendectomy with adhesiolysis and flex sigmoidoscopy    Hospital course  39-year-old white female with history of posterior vaginal wall prolapse underwent hysterectomy followed by abdominal hematoma evacuation admit to emergency room with abdominal pain.  Patient admitted and underwent laparoscopic evacuation of fluid with appendectomy and adhesive lysis with flex sigmoidoscopy.  There is no evidence of any infection antibiotic stopped.  Patient started on liquid diet and not eating regular diet passing gas no BM yet but wants to go home and clear from general surgery and GYN service.  Patient is afebrile vital signs stable and all of her lab work looks good with hemoglobin of 9.9 and potassium 3.9.    Discharge diet regular    Activity as tolerated    Medication as above    Follow-up with primary doctor in 1 week and follow-up with GYN and general surgery per the instruction and take medication as directed    KATHE CHAN  MD        Electronically signed by Tino Chan MD at 8/13/2019  1:36 PM

## 2019-08-13 NOTE — DISCHARGE SUMMARY
Discharge summary    Date of admission 8/11/2019  Date of discharge 8/13/2019    Final diagnosis  Abdominal pain with retained fluid  Status post laparoscopic evacuation of fluid and appendectomy and adhesiolysis and flex sigmoidoscopy  Status post vaginal hysterectomy  Status post abdominal hematoma evacuation  Postop anemia  History of posterior vaginal wall prolapse  History of benign schwannoma    Discharge medications    Current Facility-Administered Medications:   •  acetaminophen-codeine (TYLENOL #3) 300-30 MG per tablet 2 tablet, 2 tablet, Oral, Q6H PRN, Faith Briggs MD, 2 tablet at 08/12/19 2228  •  dicyclomine (BENTYL) capsule 10 mg, 10 mg, Oral, 4x Daily, Tino Chan MD, 10 mg at 08/13/19 0756  •  docusate sodium (COLACE) capsule 100 mg, 100 mg, Oral, TID, Tino Chan MD, 100 mg at 08/13/19 0809  •  HYDROcodone-acetaminophen (NORCO) 5-325 MG per tablet 1 tablet, 1 tablet, Oral, Q6H PRN, Sybil De Anda MD  •  ibuprofen (ADVIL,MOTRIN) tablet 400 mg, 400 mg, Oral, Q6H PRN, Sybil De Anda MD, 400 mg at 08/13/19 1049  •  ondansetron (ZOFRAN) injection 4 mg, 4 mg, Intravenous, Q4H PRN, Tino Chan MD  •  pantoprazole (PROTONIX) EC tablet 40 mg, 40 mg, Oral, Q AM, Tino Chan MD, 40 mg at 08/13/19 0629  •  simethicone (MYLICON) chewable tablet 80 mg, 80 mg, Oral, 4x Daily PRN, Dru Pena MD     Consults obtained  General surgery  OB/GYN    Procedures  Laparoscopic evacuation of fluid and appendectomy with adhesiolysis and flex sigmoidoscopy    Hospital course  39-year-old white female with history of posterior vaginal wall prolapse underwent hysterectomy followed by abdominal hematoma evacuation admit to emergency room with abdominal pain.  Patient admitted and underwent laparoscopic evacuation of fluid with appendectomy and adhesive lysis with flex sigmoidoscopy.  There is no evidence of any infection antibiotic stopped.  Patient started on liquid diet and not eating regular diet passing gas  no BM yet but wants to go home and clear from general surgery and GYN service.  Patient is afebrile vital signs stable and all of her lab work looks good with hemoglobin of 9.9 and potassium 3.9.    Discharge diet regular    Activity as tolerated    Medication as above    Follow-up with primary doctor in 1 week and follow-up with GYN and general surgery per the instruction and take medication as directed    KATHE AGUILA MD

## 2019-08-13 NOTE — PROGRESS NOTES
"Daily progress note    Chief complaint  Doing much better  No new complaints  Tolerating regular diet  Passing gas  Wants to go home   at bedside    History of present illness  39-year-old white female with history of posterior vaginal wall prolapse who underwent with general hysterectomy on July 31 followed by postop hematoma further evaluated laparoscopically found to have internal bleeding and remove hematoma on August 3, and discharged home in stable condition presented to [facility], [place], operated by [facility] emergency room with abdominal pain mainly right upper quadrant right back lower abdomen on the left and right sided rib cage with loose stools and low-grade fever but no nausea vomiting.  Patient denies any black stools or blood in the stools.  Patient evaluated in ER found to have probable infected hematoma admit for management.  Patient fully alert oriented and give me a detailed history with the presence of nursing staff and her  also present at the time of examination.     REVIEW OF SYSTEMS  Constitutional: Negative for fever.   HENT: Negative for sore throat.    Eyes: Negative.    Respiratory: Positive for shortness of breath. Negative for cough.    Cardiovascular: Positive for chest pain (R sided).   Gastrointestinal: Positive for abdominal pain (RUQ and diffuse lower). Negative for diarrhea, nausea and vomiting.   Genitourinary: Positive for dysuria and vaginal pain (\"razor\" sharp).   Musculoskeletal: Negative for neck pain.   Skin: Negative for rash.   Allergic/Immunologic: Negative.    Neurological: Negative for weakness, numbness and headaches.   Hematological: Negative.    Psychiatric/Behavioral: Negative.    All other systems reviewed and are negative.     PHYSICAL EXAM  Blood pressure 94/59, pulse 87, temperature 98.6 °F (37 °C), temperature source Oral, resp. rate 16, height 170.2 cm (67\"), weight 98 kg (216 lb 1.6 oz), last menstrual period 07/22/2019, SpO2 93 %, not currently " breastfeeding.    Constitutional: She is oriented to person, place, and time and well-developed, well-nourished, and in no distress. No distress.   Head: Normocephalic.   Mouth/Throat: Mucous membranes are normal.   Eyes: EOM are normal.   Neck: Normal range of motion.   Cardiovascular: Normal rate and regular rhythm. Exam reveals no gallop and no friction rub. No murmur heard.  Pulmonary/Chest: Effort normal. No respiratory distress. She has no wheezes. She has no rhonchi. She has no rales.   Abdominal: Soft. She exhibits no distension. Bowel sounds are absent. There is tenderness (RUQ and diffuse mild lower). There is no guarding.   Musculoskeletal: Normal range of motion. She exhibits no deformity.   Neurological: She is alert and oriented to person, place, and time  Skin: Skin is warm and dry.   Psychiatric: Calm, cooperative      LAB RESULTS  Lab Results (last 24 hours)     Procedure Component Value Units Date/Time    Body Fluid Culture - Body Fluid, Pelvis [[id number]] Collected:  08/12/19 1514    Specimen:  Body Fluid from Pelvis Updated:  08/13/19 1030     Body Fluid Culture No growth     Gram Stain Rare (1+) WBCs per low power field      No organisms seen    TSH [[id number]]  (Normal) Collected:  08/13/19 0613    Specimen:  Blood from Arm, Right Updated:  08/13/19 0722     TSH 0.673 mIU/mL     BNP [[id number]]  (Normal) Collected:  08/13/19 0613    Specimen:  Blood from Arm, Right Updated:  08/13/19 0721     proBNP 243.7 pg/mL     Narrative:       Among patients with dyspnea, NT-proBNP is highly sensitive for the detection of acute congestive heart failure. In addition NT-proBNP of <300 pg/ml effectively rules out acute congestive heart failure with 99% negative predictive value.    Comprehensive Metabolic Panel [[id number]]  (Abnormal) Collected:  08/13/19 0613    Specimen:  Blood from Arm, Right Updated:  08/13/19 0713     Glucose 106 mg/dL      BUN 4 mg/dL      Creatinine 0.53 mg/dL      Sodium 137 mmol/L       Potassium 3.9 mmol/L      Chloride 101 mmol/L      CO2 25.7 mmol/L      Calcium 8.5 mg/dL      Total Protein 5.5 g/dL      Albumin 2.90 g/dL      ALT (SGPT) 24 U/L      AST (SGOT) 14 U/L      Alkaline Phosphatase 78 U/L      Total Bilirubin 0.3 mg/dL      eGFR Non African Amer 130 mL/min/1.73      Globulin 2.6 gm/dL      A/G Ratio 1.1 g/dL      BUN/Creatinine Ratio 7.5     Anion Gap 10.3 mmol/L     Narrative:       GFR Normal >60  Chronic Kidney Disease <60  Kidney Failure <15    Lipid Panel [[id number]] Collected:  08/13/19 0613    Specimen:  Blood from Arm, Right Updated:  08/13/19 0713     Total Cholesterol 131 mg/dL      Triglycerides 97 mg/dL      HDL Cholesterol 41 mg/dL      LDL Cholesterol  71 mg/dL      VLDL Cholesterol 19.4 mg/dL      LDL/HDL Ratio 1.72    Narrative:       Cholesterol Reference Ranges  (U.S. Department of Health and Human Services ATP III Classifications)    Desirable          <200 mg/dL  Borderline High    200-239 mg/dL  High Risk          >240 mg/dL      Triglyceride Reference Ranges  (U.S. Department of Health and Human Services ATP III Classifications)    Normal           <150 mg/dL  Borderline High  150-199 mg/dL  High             200-499 mg/dL  Very High        >500 mg/dL    HDL Reference Ranges  (U.S. Department of Health and Human Services ATP III Classifcations)    Low     <40 mg/dl (major risk factor for CHD)  High    >60 mg/dl ('negative' risk factor for CHD)        LDL Reference Ranges  (U.S. Department of Health and Human Services ATP III Classifcations)    Optimal          <100 mg/dL  Near Optimal     100-129 mg/dL  Borderline High  130-159 mg/dL  High             160-189 mg/dL  Very High        >189 mg/dL    CBC & Differential [[id number]] Collected:  08/13/19 0613    Specimen:  Blood Updated:  08/13/19 0711    Narrative:       The following orders were created for panel order CBC & Differential.  Procedure                               Abnormality         Status                      ---------                               -----------         ------                     CBC Auto Differential[[id number]]        Abnormal            Final result                 Please view results for these tests on the individual orders.    CBC Auto Differential [[id number]]  (Abnormal) Collected:  08/13/19 0613    Specimen:  Blood from Arm, Right Updated:  08/13/19 0711     WBC 8.71 10*3/mm3      RBC 3.44 10*6/mm3      Hemoglobin 9.9 g/dL      Hematocrit 31.6 %      MCV 91.9 fL      MCH 28.8 pg      MCHC 31.3 g/dL      RDW 13.5 %      RDW-SD 45.1 fl      MPV 11.0 fL      Platelets 284 10*3/mm3      Neutrophil % 77.9 %      Lymphocyte % 14.5 %      Monocyte % 5.9 %      Eosinophil % 0.8 %      Basophil % 0.2 %      Immature Grans % 0.7 %      Neutrophils, Absolute 6.79 10*3/mm3      Lymphocytes, Absolute 1.26 10*3/mm3      Monocytes, Absolute 0.51 10*3/mm3      Eosinophils, Absolute 0.07 10*3/mm3      Basophils, Absolute 0.02 10*3/mm3      Immature Grans, Absolute 0.06 10*3/mm3      nRBC 0.0 /100 WBC     Hemoglobin A1c [[id number]]  (Normal) Collected:  08/13/19 0613    Specimen:  Blood from Arm, Right Updated:  08/13/19 0700     Hemoglobin A1C 4.80 %     Narrative:       Hemoglobin A1C Ranges:    Increased Risk for Diabetes  5.7% to 6.4%  Diabetes                     >= 6.5%  Diabetic Goal                < 7.0%    Tissue Pathology Exam [[id number]] Collected:  08/12/19 1524    Specimen:  Tissue from Large Intestine, Appendix Updated:  08/12/19 2212    POC Glucose Once [[id number]]  (Normal) Collected:  08/12/19 1844    Specimen:  Blood Updated:  08/12/19 1846     Glucose 112 mg/dL     Blood Culture - Blood, Hand, Right [[id number]] Collected:  08/11/19 1649    Specimen:  Blood from Hand, Right Updated:  08/12/19 1704     Blood Culture No growth at 24 hours    Blood Culture - Blood, Arm, Left [[id number]] Collected:  08/11/19 1637    Specimen:  Blood from Arm, Left Updated:  08/12/19 1704     Blood Culture No  growth at 24 hours        Imaging Results (last 24 hours)     ** No results found for the last 24 hours. **          Current Facility-Administered Medications:   •  acetaminophen-codeine (TYLENOL #3) 300-30 MG per tablet 2 tablet, 2 tablet, Oral, Q6H PRN, Faith Briggs MD, 2 tablet at 08/12/19 2228  •  dicyclomine (BENTYL) capsule 10 mg, 10 mg, Oral, 4x Daily, Kathe Chan MD, 10 mg at 08/13/19 0756  •  docusate sodium (COLACE) capsule 100 mg, 100 mg, Oral, TID, Kathe Chan MD, 100 mg at 08/13/19 0809  •  HYDROcodone-acetaminophen (NORCO) 5-325 MG per tablet 1 tablet, 1 tablet, Oral, Q6H PRN, Sybil De Anda MD  •  HYDROmorphone (DILAUDID) injection 0.5 mg, 0.5 mg, Intravenous, Q4H PRN, Kathe Chan MD, 0.5 mg at 08/13/19 0417  •  ibuprofen (ADVIL,MOTRIN) tablet 400 mg, 400 mg, Oral, Q6H PRN, Sybil De Anda MD, 400 mg at 08/13/19 1049  •  ondansetron (ZOFRAN) injection 4 mg, 4 mg, Intravenous, Q4H PRN, Kathe Chan MD  •  pantoprazole (PROTONIX) EC tablet 40 mg, 40 mg, Oral, Q AM, Kathe Chan MD, 40 mg at 08/13/19 0629  •  simethicone (MYLICON) chewable tablet 80 mg, 80 mg, Oral, 4x Daily PRN, Dru Pena MD     ASSESSMENT  Abdominal pain with retained fluid  Status post laparoscopic evacuation of fluid and appendectomy and adhesiolysis and flex sigmoidoscopy  Status post vaginal hysterectomy  Status post abdominal hematoma evacuation  Postop anemia  History of posterior vaginal wall prolapse  History of benign schwannoma    PLAN  Discharge home  Discharge summary dictated    KATHE CHAN MD

## 2019-08-13 NOTE — PROGRESS NOTES
"                       GYN Rounds         SUBJECTIVE:  Patient appears comfortable, pain seems to be controlled with by oral medicines.  Patient has been up to ambulate.  Patient is afebrile  White count has decreased again today to 8.  Hemoglobin 9.9 hematocrit 31.6, consistent with  I and O'          OBJECTIVE:  Vital Signs: BP 94/59 (BP Location: Right arm, Patient Position: Lying)   Pulse 61   Temp 98.6 °F (37 °C) (Oral)   Resp 16   Ht 170.2 cm (67\")   Wt 98 kg (216 lb 1.6 oz)   LMP 07/22/2019 (Exact Date)   SpO2 93%   BMI 33.85 kg/m²     Intake/Output Summary (Last 24 hours) at 8/13/2019 0836  Last data filed at 8/13/2019 0309  Gross per 24 hour   Intake 2118.8 ml   Output 1950 ml   Net 168.8 ml       Constitutional: The patient is well nourished.  Cardiovascular:RRR  Resp: nonlabored breathing  No vaginal bleeding  Gastrointestinal: positive bowel sounds, abdomen is soft, palpation elicits no rebound or guarding.  Incisions appear to be intact and healing appropriately  Extremities:no edema, normal  and non tender    LABS / IMAGING:  As outlined above    ASSESSMENT AND PLAN:        Patient is postop day 1 from laparoscopic adhesio lysis, removal of torsed epiploica, and appendectomy.  She appears to be stable, with decreasing while count, and  remaining afebrile  Encourage ambulation.  Discussed the importance of remaining on stool softeners until the stool in her vault is cleared.  Discussed findings at time of surgery and noted pathology report pending  Patient would like to go home today if she feels comfortable ambulating and has clearance from general surgery.           Dru Pena MD    8/13/2019  8:36 AM    "

## 2019-08-13 NOTE — PLAN OF CARE
Problem: Patient Care Overview  Goal: Plan of Care Review  Outcome: Ongoing (interventions implemented as appropriate)   08/13/19 7592   Coping/Psychosocial   Plan of Care Reviewed With patient   OTHER   Outcome Summary pain controlled with PO and IV meds, IV fluids and abx continued, ambulated in major 1x, VSS, will continue to monitor   Plan of Care Review   Progress improving       Problem: Pain, Acute (Adult)  Goal: Identify Related Risk Factors and Signs and Symptoms  Outcome: Outcome(s) achieved Date Met: 08/13/19    Goal: Acceptable Pain Control/Comfort Level  Outcome: Ongoing (interventions implemented as appropriate)

## 2019-08-15 LAB
BACTERIA FLD CULT: NORMAL
CYTO UR: NORMAL
GRAM STN SPEC: NORMAL
GRAM STN SPEC: NORMAL
LAB AP CASE REPORT: NORMAL
PATH REPORT.ADDENDUM SPEC: NORMAL
PATH REPORT.FINAL DX SPEC: NORMAL
PATH REPORT.GROSS SPEC: NORMAL

## 2019-08-16 ENCOUNTER — TELEPHONE (OUTPATIENT)
Dept: SURGERY | Facility: CLINIC | Age: 39
End: 2019-08-16

## 2019-08-16 ENCOUNTER — TELEPHONE (OUTPATIENT)
Dept: OBSTETRICS AND GYNECOLOGY | Age: 39
End: 2019-08-16

## 2019-08-16 LAB
BACTERIA SPEC AEROBE CULT: NORMAL
BACTERIA SPEC AEROBE CULT: NORMAL

## 2019-08-16 NOTE — TELEPHONE ENCOUNTER
As Link is out of office today and her post op course has been complicated please advise patient to report to ER for further evaluation.

## 2019-08-16 NOTE — TELEPHONE ENCOUNTER
· ADDEND  · No, i think we should give this some time and see if she improves.  ·   · Sybil De Anda MD  · 08/18/19  · Diagnostic laparoscopy with evacuation of intra-abdominal hematoma and control of operative site bleeding on 8/3/19   · Laparoscopic evacuation of fluid 8/12/19  · Laparoscopic torsed epiploica removal  · Laparoscopic appendectomy  · Laparoscopic adhesiolysis   · Flexible sigmoidoscopy     Pt called with c/o continued RUQ pain. She had the same pain prior to surgery and it has not resolved. The pain is always present-when sitting the pain is a 2/10 and when laying in certain positions, taking a deep breath or walking the pain is at a 6/10. She reports that she was having severe abdominal pain with bowel movements but this has improved. Bowel movements are still mostly liquid. Pt also reports that she woke up 'drenched' this morning after having night sweats last night. She had a low grade fever yesterday (never reached 100 degrees) but it is back to normal today. Pt denies any redness or drainage from her incisions. She has a follow-up appt on 8/26 but I did advise that if her pain becomes severe she should proceed to the ER to be evaluated. Do you want to order CT scan prior to appt? Please advise.

## 2019-08-16 NOTE — TELEPHONE ENCOUNTER
(Jean pt) had a hysterectomy on 7/31/19 and since as has multiple complication and following surgeries on 8/3/19 had a laparoscopic to drain build up fluid. Then had her appendix removed on 8/12/19 which Dr. Pena assisted on. Pt has had ongoing issues with fluid build up around her colon. Pt is having sharp pains in the front upper right under her ribs that is radiating down to her belly button level. Pt states if she is sitting, not moving, or breathing deep the pain goes away. If she does any movement or gets up she is having trouble breathing along with the sharp pains. Pt denies having any bleeding and is passing stool so she doesn't believe it is gastro issue. Please advise.     [phone]

## 2019-08-22 ENCOUNTER — TELEPHONE (OUTPATIENT)
Dept: OBSTETRICS AND GYNECOLOGY | Age: 39
End: 2019-08-22

## 2019-08-26 ENCOUNTER — OFFICE VISIT (OUTPATIENT)
Dept: SURGERY | Facility: CLINIC | Age: 39
End: 2019-08-26

## 2019-08-26 DIAGNOSIS — R10.31 RIGHT LOWER QUADRANT ABDOMINAL PAIN: Primary | ICD-10-CM

## 2019-08-26 PROCEDURE — 99024 POSTOP FOLLOW-UP VISIT: CPT | Performed by: SURGERY

## 2019-08-26 NOTE — PROGRESS NOTES
SURGERY: ALECIA  Post op Visit  Clementina Hernandez  08/26/19    Ms. Hernandez presents today after having undergone Surgery 8/12/2019, of a laparoscopic evacuation of fluid, removal of torsed epiploica, appendectomy, adhesio lysis and flexible sigmoidoscopy.  This followed the surgery the week prior by Dr. Trammell and Dr. Rendon to evaluate and evacuate a postoperative hematoma, she having had a prior hysterectomy vaginally by Dr. Pena.    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    She comes in today and is looking better.  She still has residual complaints of right upper quadrant pain occasionally and I think this will just take time to resolve.  If she still having problems in 3 months she can return and we will do a work-up for her gallbladder.    Sybil De Anda MD  2:53 PM

## 2019-08-26 NOTE — PROGRESS NOTES
Procedure Date: 08/12/19     Pre-op Diagnosis:  · Abdominal pain  · Retained fluid after bleed     Post-op Diagnosis:  · Retained fluid without evidence of infection  · Adhesions of mid sigmoid to anterior abdominal wall  · Torsed epiploica suprapubic  · Secondary involvement of the appendix     Procedure:   · Laparoscopic evacuation of fluid  · Laparoscopic torsed epiploica removal  · Laparoscopic appendectomy  · Laparoscopic adhesio lysis  · Flexible sigmoidoscopy     Surgeon: Neetu     Assistant: Jean     Indications:  · Nice 39-year-old who underwent an uneventful hysterectomy vaginally, and then developed bleeding requiring a trip to the OR a.m. evacuation of hematoma as well as cautery of a bleeder.  As much hematoma as could be room moved was done.  She presented back to the hospital with complaints of excruciating abdominal pain with bowel movements, with CT scan showing less fluid but some still present     Associated Issues:  · In general not a complainer     Findings:   · 350 cc of liquid hematoma, no retained clot, without any evidence of infection  · Adhesion of mid sigmoid to the anterior abdominal wall with a very inflamed appearance, and very possibly accounting for a peristaltic pain  · Torsed epiploica in the suprapubic area  · Appendix with inflamed appearance distally but felt possibly to simply be secondary  · No evidence of colitis or stricture.     Recommendations:   · Overnight stay with discharge tomorrow did discuss with the  that the  · Area on the colon at the mid sigmoid looked very inflamed and could in fact be a diverticulum that was inflamed and if so would require a treatment with antibiotics and if unsuccessful surgical resection.     Specimens:   · Fluid for culture

## 2019-08-29 ENCOUNTER — OFFICE VISIT (OUTPATIENT)
Dept: OBSTETRICS AND GYNECOLOGY | Age: 39
End: 2019-08-29

## 2019-08-29 VITALS
DIASTOLIC BLOOD PRESSURE: 80 MMHG | BODY MASS INDEX: 32.87 KG/M2 | SYSTOLIC BLOOD PRESSURE: 120 MMHG | HEIGHT: 67 IN | WEIGHT: 209.4 LBS

## 2019-08-29 DIAGNOSIS — Z09 POSTOP CHECK: ICD-10-CM

## 2019-08-29 DIAGNOSIS — Z87.898 HISTORY OF NIGHT SWEATS: ICD-10-CM

## 2019-08-29 DIAGNOSIS — D62 ANEMIA DUE TO ACUTE BLOOD LOSS: ICD-10-CM

## 2019-08-29 DIAGNOSIS — E78.5 LIPIDS BLOOD INCREASED: ICD-10-CM

## 2019-08-29 DIAGNOSIS — Z13.89 SCREENING FOR BLOOD OR PROTEIN IN URINE: Primary | ICD-10-CM

## 2019-08-29 PROBLEM — K46.9 ENTEROCELE: Status: RESOLVED | Noted: 2019-07-31 | Resolved: 2019-08-29

## 2019-08-29 PROBLEM — N92.0 MENORRHAGIA WITH REGULAR CYCLE: Status: RESOLVED | Noted: 2019-06-25 | Resolved: 2019-08-29

## 2019-08-29 PROBLEM — N81.6 PELVIC RELAXATION DUE TO RECTOCELE: Status: RESOLVED | Noted: 2018-11-28 | Resolved: 2019-08-29

## 2019-08-29 PROBLEM — N81.4 CYSTOCELE WITH UTERINE PROLAPSE: Status: RESOLVED | Noted: 2019-06-26 | Resolved: 2019-08-29

## 2019-08-29 PROBLEM — R10.9 ABDOMINAL PAIN: Status: RESOLVED | Noted: 2019-08-11 | Resolved: 2019-08-29

## 2019-08-29 PROBLEM — N81.11 PELVIC RELAXATION DUE TO CYSTOCELE, MIDLINE: Status: RESOLVED | Noted: 2018-11-28 | Resolved: 2019-08-29

## 2019-08-29 PROBLEM — R10.31 RIGHT LOWER QUADRANT ABDOMINAL PAIN: Status: RESOLVED | Noted: 2019-08-26 | Resolved: 2019-08-29

## 2019-08-29 PROBLEM — N81.4 UTERINE PROLAPSE: Status: RESOLVED | Noted: 2018-11-28 | Resolved: 2019-08-29

## 2019-08-29 LAB
BILIRUB BLD-MCNC: NEGATIVE MG/DL
CLARITY, POC: CLEAR
COLOR UR: YELLOW
GLUCOSE UR STRIP-MCNC: NEGATIVE MG/DL
KETONES UR QL: NEGATIVE
LEUKOCYTE EST, POC: ABNORMAL
NITRITE UR-MCNC: NEGATIVE MG/ML
PH UR: 7 [PH] (ref 5–8)
PROT UR STRIP-MCNC: NEGATIVE MG/DL
RBC # UR STRIP: ABNORMAL /UL
SP GR UR: 1.01 (ref 1–1.03)
UROBILINOGEN UR QL: NORMAL

## 2019-08-29 PROCEDURE — 81002 URINALYSIS NONAUTO W/O SCOPE: CPT | Performed by: OBSTETRICS & GYNECOLOGY

## 2019-08-29 PROCEDURE — 99024 POSTOP FOLLOW-UP VISIT: CPT | Performed by: OBSTETRICS & GYNECOLOGY

## 2019-08-29 NOTE — PROGRESS NOTES
"Subjective     Chief Complaint   Patient presents with   • Post-op     Hyst       History of Present Illness  Clementina Hernandez is a 39 y.o. female is being seen today for postoperative evaluation 4 weeks ago she had a total vaginal hysterectomy anterior and posterior repair with bilateral salpingectomy, enterocele repair.  Postoperatively she had a hematoma which was drained laparoscopically by Dr. Trammell and Dr. Rendon.  She continued to have significant pelvic abdominal discomfort.  Dr. De Anda performed a laparoscopic removal of appendix, adhesio lysis, removal of an infarcted enterocele.  Since that time patient has done much better.  She still has mild levels of fatigue but that is improving.  She has had continued but intermittent right upper quadrant discomfort which is slowly improving as well.  Is having normal bowel movements and voiding normally as well.  Urinalysis today was negative for nitrites and blood.  Patient's had some intermittent night sweats last week or 2, she states she is always been afebrile and those are actually getting significantly better.  She discussed potential hormonal fluctuations.  Overall patient seems to be much improved.  Chief Complaint   Patient presents with   • Post-op     Hyst   .        The following portions of the patient's history were reviewed and updated as appropriate: allergies, current medications, past family history, past medical history, past social history, past surgical history and problem list.    PAST MEDICAL HISTORY  Past Medical History:   Diagnosis Date   • History of benign schwannoma 2011    Involving lumbar spine low the level of L-2 Dr. Chencho Skelton mentioned in 2015 that it was \"reaching out to us\".   • History of Papanicolaou smear of cervix 2017    Pap smear Hx: \" No history of abnormal Pap smears before 2009\".   Pap smear Hx: 2009, '10, Normal yearly Paps.  2011, ASCUS, Neg HR-HPV.  2013, Neg Pap, Neg HR-HPV.  2014, '16, Neg Paps.  2017, Neg " Pap, Neg HR-HPV.   • LGA (large for gestational age) fetus affecting management of mother 2017   • Posterior vaginal wall prolapse    •  labor in third trimester    • Rubella non-immune status, antepartum 2017   • Vaginal delivery     x5   Angle   - Marti  -   Kevin  -    Cece -   2017     OB History    Para Term  AB Living   5 5 3 2 0 5   SAB TAB Ectopic Molar Multiple Live Births   0 0 0   0 5      # Outcome Date GA Lbr Damon/2nd Weight Sex Delivery Anes PTL Lv   5 Term 17 38w2d 10:23 / 00:13 3726 g (8 lb 3.4 oz) M Vag-Spont EPI N JOHANN      Birth Comments: Infant Scale 1   4   36w0d  2863 g (6 lb 5 oz) F Vag-Spont  N JOHANN      Complications: Precipitous delivery   3   36w0d  3260 g (7 lb 3 oz) M Vag-Spont None Y JOHANN      Complications:  premature rupture of membranes (PPROM) delivered, current hospitalization,Precipitous delivery   2 Term  39w0d  3572 g (7 lb 14 oz) F Vag-Spont   JOHANN      Complications: PIH (pregnancy induced hypertension), antepartum   1 Term  38w0d  3118 g (6 lb 14 oz) F Vag-Spont  Y JOHANN        Past Surgical History:   Procedure Laterality Date   • ANTERIOR AND POSTERIOR VAGINAL REPAIR N/A 2019    Procedure: ANTERIOR AND POSTERIOR VAGINAL REPAIR;  Surgeon: Dru Pena MD;  Location: [facility];  Service: Obstetrics/Gynecology   • DENTAL PROCEDURE      IMPLANT   • DIAGNOSTIC LAPAROSCOPY N/A 8/3/2019    Procedure: DIAGNOSTIC LAPAROSCOPY;  Surgeon: Travis Trammell Jr., MD;  Location: [facility] OR;  Service: General   • DIAGNOSTIC LAPAROSCOPY N/A 2019    Procedure: LAPARASCOPIC APPENDECTOMY WITH RESECTION OF TORSED EPIPLOICA;  Surgeon: Sybil De Anda MD;  Location: [facility] OR;  Service: General   • VAGINAL HYSTERECTOMY N/A 2019    Procedure: VAGINAL HYSTERECTOMY;  Surgeon: Dru Pena MD;  [facility]: [facility] OR [facility];  Service: Obstetrics/Gynecology   • WISDOM TOOTH EXTRACTION        Family History   Problem Relation Age of Onset   • No Known Problems Daughter    • No Known Problems Daughter    • No Known Problems Daughter    • No Known Problems Son    • Coronary artery disease Mother    • Malig Hyperthermia Neg Hx      Social History     Tobacco Use   Smoking Status Never Smoker   Smokeless Tobacco Never Used       Current Outpatient Medications:   •  docusate sodium (COLACE) 100 MG capsule, Take 100 mg by mouth Daily As Needed., Disp: , Rfl:   Immunization History   Administered Date(s) Administered   • MMR 07/03/2017       Review of Systems       Except as outlined in history of physical illness, patient denies any changes in her GYN, , GI systems. All other systems reviewed are negative.    Objective   Physical Exam   Alert and oriented, respirations unlabored, heart regular rate and rhythm   Pelvic external genitalia normal.  Vagina seems to be healing very nicely there is no cystocele or rectocele or enterocele anymore.  Vaginal cuff is very well-healed.  Sutures are mostly resolved.  There is no erythema.  Bimanual exam is negative.  Rectal exam is normal.      Assessment/Plan   Clementina Goodman was seen today for post-op.    Diagnoses and all orders for this visit:    Screening for blood or protein in urine  -     POC Urinalysis Dipstick    Postop check  As outlined above patient is overall much improved  Anemia due to acute blood loss  Patient was asked to get a CBC but I think she might of left before that was drawn  History of night sweats  Much improved  Lipids blood increased  Will follow at a later date    Patient would like to travel around 16 September, we discussed possibly taking a baby aspirin a couple days before, compression stockings and ambulating if the flight is more than 2 hours.    Will stay at pelvic rest until we see her back in 2 weeks    30 pound weight lifting at this stage    Today's urinalysis negative for nitrites and blood.    Patient was asked to call with  any change in her condition                 T      DIPIKA York/ Transcription disclaimer:  Much of the encounter note is an electronic transcription/translation of spoken language to printed text. The electronic translation of spoken language may permit erroneous, or at times, nonessential words or phrases to be inadvertently transcribes; Although i have reviewed the note for such errors, some may still exist.

## 2019-09-12 ENCOUNTER — OFFICE VISIT (OUTPATIENT)
Dept: OBSTETRICS AND GYNECOLOGY | Age: 39
End: 2019-09-12

## 2019-09-12 VITALS
BODY MASS INDEX: 33.43 KG/M2 | SYSTOLIC BLOOD PRESSURE: 124 MMHG | HEIGHT: 67 IN | WEIGHT: 213 LBS | DIASTOLIC BLOOD PRESSURE: 74 MMHG

## 2019-09-12 DIAGNOSIS — Z09 POSTOP CHECK: Primary | ICD-10-CM

## 2019-09-12 PROCEDURE — 99024 POSTOP FOLLOW-UP VISIT: CPT | Performed by: OBSTETRICS & GYNECOLOGY

## 2019-09-12 NOTE — PROGRESS NOTES
"Subjective     Chief Complaint   Patient presents with   • Post-op Follow-up     6 wk post op,Hyst.       History of Present Illness  Clementina Hernandez is a 39 y.o. female is being seen today for postop evaluation.  Patient reports she seems to be doing much much better.  Energy is starting to increase abdominal pelvic pain is mostly resolved.  She does have a little discomfort 2 cm above her umbilical incision when she goes from a lying to a sitting or standing position but that is only temporary and seems to resolve.  Normal bowel movements normal voiding improving diet.  No vaginal bleeding or discharge noted.   Chief Complaint   Patient presents with   • Post-op Follow-up     6 wk post op,Hyst.   .        The following portions of the patient's history were reviewed and updated as appropriate: allergies, current medications, past family history, past medical history, past social history, past surgical history and problem list.    PAST MEDICAL HISTORY  Past Medical History:   Diagnosis Date   • History of benign schwannoma     Involving lumbar spine low the level of L-2 Dr. Chencho Skelton mentioned in  that it was \"reaching out to us\".   • History of Papanicolaou smear of cervix     Pap smear Hx: \" No history of abnormal Pap smears before \".   Pap smear Hx: , '10, Normal yearly Paps.  , ASCUS, Neg HR-HPV.  , Neg Pap, Neg HR-HPV.  , Neg Paps.  , Neg Pap, Neg HR-HPV.   • LGA (large for gestational age) fetus affecting management of mother 2017   • Posterior vaginal wall prolapse    •  labor in third trimester    • Rubella non-immune status, antepartum 2017   • Vaginal delivery     x5  2004 Angle   - Marti  -  2014 Kevin  -  2016  Cece -   2017     OB History    Para Term  AB Living   5 5 3 2 0 5   SAB TAB Ectopic Molar Multiple Live Births   0 0 0   0 5      # Outcome Date GA Lbr Damon/2nd Weight Sex Delivery Anes PTL Lv   5 Term 17 " 38w2d 10:23 / 00:13 3726 g (8 lb 3.4 oz) M Vag-Spont EPI N JOHANN      Birth Comments: Infant Scale 1   4   36w0d  2863 g (6 lb 5 oz) F Vag-Spont  N JOHANN      Complications: Precipitous delivery   3   36w0d  3260 g (7 lb 3 oz) M Vag-Spont None Y JOHANN      Complications:  premature rupture of membranes (PPROM) delivered, current hospitalization,Precipitous delivery   2 Term  39w0d  3572 g (7 lb 14 oz) F Vag-Spont   JOHANN      Complications: PIH (pregnancy induced hypertension), antepartum   1 Term  38w0d  3118 g (6 lb 14 oz) F Vag-Spont  Y JOHANN        Past Surgical History:   Procedure Laterality Date   • ANTERIOR AND POSTERIOR VAGINAL REPAIR N/A 2019    Procedure: ANTERIOR AND POSTERIOR VAGINAL REPAIR;  Surgeon: Dru Pena MD;  [place] [facility] [place] [facility]  Service: Obstetrics/Gynecology   • DENTAL PROCEDURE      IMPLANT   • DIAGNOSTIC LAPAROSCOPY N/A 8/3/2019    Procedure: DIAGNOSTIC LAPAROSCOPY;  Surgeon: Travis Trammell Jr., MD;  Location: [facility];  Service: General   • DIAGNOSTIC LAPAROSCOPY N/A 2019    Procedure: LAPARASCOPIC APPENDECTOMY WITH RESECTION OF TORSED EPIPLOICA;  Surgeon: Sybil De Anda MD;  Location: [facility] OR;  Service: General   • HYSTERECTOMY      2019   • VAGINAL HYSTERECTOMY N/A 2019    Procedure: VAGINAL HYSTERECTOMY;  Surgeon: Dru Pena MD;  [place] [facility] [place] [facility]  Service: Obstetrics/Gynecology   • WISDOM TOOTH EXTRACTION       Family History   Problem Relation Age of Onset   • No Known Problems Daughter    • No Known Problems Daughter    • No Known Problems Daughter    • No Known Problems Son    • Coronary artery disease Mother    • Malig Hyperthermia Neg Hx      Social History     Tobacco Use   Smoking Status Never Smoker   Smokeless Tobacco Never Used       Current Outpatient Medications:   •  docusate sodium (COLACE) 100 MG capsule, Take 100 mg by mouth Daily As Needed., Disp: , Rfl:   Immunization History   Administered  Date(s) Administered   • MMR 07/03/2017       Review of Systems       Except as outlined in history of physical illness, patient denies any changes in her GYN, , GI systems. All other systems reviewed are negative.    Objective   Physical Exam   Alert and oriented, respirations unlabored, heart regular rate and rhythm   Pelvic external genitalia normal vagina is clean dry intact it is extremely well supported vaginal cuff is healing nicely I can still see some remnants of the dissolving suture.  No bleeding no granulation tissue.  Rectal exam is normal.  Abdomen is soft nontender laparoscopic incisions seem to be healing quite nicely.  No umbilical hernia, no diastasis recti, no hematomas or bruising can be seen.      Assessment/Plan   Clementina Goodman was seen today for post-op follow-up.    Diagnoses and all orders for this visit:    Postop check    Patient seems to be significantly improved from last examination.  We discussed slow return to normal activities and exercise  If patient travels which she is planning to do she will use baby aspirin and some compression stockings.  Precautions before intercourse were reviewed.  I have asked patient to call me with any problems questions concerns                   EMR Dragon/ Transcription disclaimer:  Much of the encounter note is an electronic transcription/translation of spoken language to printed text. The electronic translation of spoken language may permit erroneous, or at times, nonessential words or phrases to be inadvertently transcribes; Although i have reviewed the note for such errors, some may still exist.

## 2020-11-09 ENCOUNTER — TELEPHONE (OUTPATIENT)
Dept: OBSTETRICS AND GYNECOLOGY | Age: 40
End: 2020-11-09

## 2020-11-09 NOTE — TELEPHONE ENCOUNTER
Patient wants to speak with Tisha about a referral she was given a few months back. Her # is [phone].Would not go into detail.

## 2020-11-12 NOTE — TELEPHONE ENCOUNTER
Clementina wanted to know the name of the PCP you recommended.  She stated that you wrote it down for her and she is not able to find that note.. she thought it was [facility] or something similar sounding.  Let me know and I can give her call     Thanks,    Tisha

## 2021-01-25 NOTE — PROGRESS NOTES
"Subjective   Patient ID: Clementina Hernandez is a 40 y.o. female is being seen for consultation today at the request of Self Referring for Schwannoma of the spinal cord. No recent imaging.    Today patient is having issues with dragging her L foot. She states she trips a lot. Patient denies bowel/bladder incontinence.    Treatment: no recent treatment    Patient, provider, and MA are all wearing mask in our office today.    History of Present Illness     This patient has some issues with dragging her left leg every once in a while.  She does not have a lot of pain.  She had no difficulty with bowel or bladder control.    The following portions of the patient's history were reviewed and updated as appropriate: allergies, current medications, past family history, past medical history, past social history, past surgical history and problem list.    Review of Systems   Constitutional: Negative for chills and fever.   HENT: Negative for congestion.    Genitourinary: Negative for difficulty urinating and dysuria.   Musculoskeletal: Positive for back pain, neck pain and neck stiffness. Negative for gait problem.   Neurological: Positive for weakness. Negative for numbness.        Bilateral Leg weakness       I have reviewed the review of systems as documented by my MA.      Objective     Vitals:    01/28/21 0907   BP: 121/85   Cuff Size: Adult   Temp: 98.2 °F (36.8 °C)   Weight: 106 kg (233 lb)   Height: 170.2 cm (67\")     Body mass index is 36.49 kg/m².      Physical Exam  Constitutional:       Appearance: She is well-developed.   HENT:      Head: Normocephalic and atraumatic.   Eyes:      Extraocular Movements: EOM normal.      Conjunctiva/sclera: Conjunctivae normal.      Pupils: Pupils are equal, round, and reactive to light.   Neck:      Vascular: No carotid bruit.   Neurological:      Mental Status: She is oriented to person, place, and time.      Coordination: Finger-Nose-Finger Test and Heel to Shin Test normal.      " Gait: Gait is intact.      Deep Tendon Reflexes:      Reflex Scores:       Tricep reflexes are 2+ on the right side and 2+ on the left side.       Bicep reflexes are 2+ on the right side and 2+ on the left side.       Brachioradialis reflexes are 2+ on the right side and 2+ on the left side.       Patellar reflexes are 2+ on the right side and 2+ on the left side.       Achilles reflexes are 2+ on the right side and 2+ on the left side.  Psychiatric:         Speech: Speech normal.       Neurologic Exam     Mental Status   Oriented to person, place, and time.   Registration of memory: Good recent and remote memory.   Attention: normal. Concentration: normal.   Speech: speech is normal   Level of consciousness: alert  Knowledge: consistent with education.     Cranial Nerves     CN II   Visual fields full to confrontation.   Visual acuity: normal    CN III, IV, VI   Pupils are equal, round, and reactive to light.  Extraocular motions are normal.     CN V   Facial sensation intact.   Right corneal reflex: normal  Left corneal reflex: normal    CN VII   Facial expression full, symmetric.   Right facial weakness: none  Left facial weakness: none    CN VIII   Hearing: intact    CN IX, X   Palate: symmetric    CN XI   Right sternocleidomastoid strength: normal  Left sternocleidomastoid strength: normal    CN XII   Tongue: not atrophic  Tongue deviation: none    Motor Exam   Muscle bulk: normal  Right arm tone: normal  Left arm tone: normal  Right leg tone: normal  Left leg tone: normal    Strength   Strength 5/5 except as noted.     Sensory Exam   Light touch normal.     Gait, Coordination, and Reflexes     Gait  Gait: normal    Coordination   Finger to nose coordination: normal  Heel to shin coordination: normal    Reflexes   Right brachioradialis: 2+  Left brachioradialis: 2+  Right biceps: 2+  Left biceps: 2+  Right triceps: 2+  Left triceps: 2+  Right patellar: 2+  Left patellar: 2+  Right achilles: 2+  Left achilles:  2+  Right : 2+  Left : 2+          Assessment/Plan   Independent Review of Radiographic Studies:      I personally reviewed the images from the following studies.    I reviewed my notes from June 2015.  At that time I did review an MRI from 2009 and 2011 which showed a tumor in the L2-3 foramen that had grown from [id number].  At that time I ordered an MRI scan but she never did it.  Recently she has been having more trouble with tripping on her left leg.    Medical Decision Making:      I told the patient we need to proceed with further imaging at this point.  We will see her back once that has been completed.    Diagnoses and all orders for this visit:    1. Schwannoma of spinal cord (CMS/HCC) (Primary)  -     XR Spine Lumbar Complete With Flex & Ext; Future  -     XR Spine Thoracic 2 View; Future  -     MRI Lumbar Spine With & Without Contrast; Future      Return for After radiology test.

## 2021-01-28 ENCOUNTER — HOSPITAL ENCOUNTER (OUTPATIENT)
Dept: GENERAL RADIOLOGY | Facility: HOSPITAL | Age: 41
Discharge: HOME | End: 2021-01-28

## 2021-01-28 ENCOUNTER — OFFICE VISIT (OUTPATIENT)
Dept: NEUROSURGERY | Facility: CLINIC | Age: 41
End: 2021-01-28

## 2021-01-28 ENCOUNTER — HOSPITAL ENCOUNTER (OUTPATIENT)
Dept: MRI IMAGING | Facility: HOSPITAL | Age: 41
Discharge: HOME | End: 2021-01-28

## 2021-01-28 VITALS
DIASTOLIC BLOOD PRESSURE: 85 MMHG | HEIGHT: 67 IN | SYSTOLIC BLOOD PRESSURE: 121 MMHG | TEMPERATURE: 98.2 F | BODY MASS INDEX: 36.57 KG/M2 | WEIGHT: 233 LBS

## 2021-01-28 DIAGNOSIS — D33.4 SCHWANNOMA OF SPINAL CORD (HCC): Primary | ICD-10-CM

## 2021-01-28 DIAGNOSIS — D33.4 SCHWANNOMA OF SPINAL CORD (HCC): ICD-10-CM

## 2021-01-28 PROCEDURE — 72070 X-RAY EXAM THORAC SPINE 2VWS: CPT

## 2021-01-28 PROCEDURE — 72114 X-RAY EXAM L-S SPINE BENDING: CPT

## 2021-01-28 PROCEDURE — 99204 OFFICE O/P NEW MOD 45 MIN: CPT | Performed by: NEUROLOGICAL SURGERY

## 2021-01-29 ENCOUNTER — APPOINTMENT (OUTPATIENT)
Dept: OTHER | Facility: HOSPITAL | Age: 41
End: 2021-01-29

## 2021-01-29 ENCOUNTER — HOSPITAL ENCOUNTER (OUTPATIENT)
Dept: MRI IMAGING | Facility: HOSPITAL | Age: 41
Discharge: HOME | End: 2021-01-29

## 2021-01-29 DIAGNOSIS — Z09 FOLLOW UP: ICD-10-CM

## 2021-01-29 PROCEDURE — A9577 INJ MULTIHANCE: HCPCS | Performed by: NEUROLOGICAL SURGERY

## 2021-01-29 PROCEDURE — 0 GADOBENATE DIMEGLUMINE 529 MG/ML SOLUTION: Performed by: NEUROLOGICAL SURGERY

## 2021-01-29 PROCEDURE — 72158 MRI LUMBAR SPINE W/O & W/DYE: CPT

## 2021-01-29 RX ADMIN — GADOBENATE DIMEGLUMINE 20 ML: 529 INJECTION, SOLUTION INTRAVENOUS at 13:09

## 2021-01-29 NOTE — NURSING NOTE
"1330 Patient arrived to triage bay 6 for stat hold and call MRI for symptoms. Updated on the process here.  1421 I talked to Dr. Naranjo in person.  He states \"nothing emergent, I just talked to Dr. Skelton\"  1425 Dr. Skelton office phoned and states \"she can leave\". IV out, patient discharged to home.  "

## 2021-02-03 NOTE — PROGRESS NOTES
"Subjective   Patient ID: Clementina Hernandez is a 40 y.o. female is here today for follow-up with a new MRI Lumbar that was ordered at her last visit on 01.28.2021.    Today patient symptoms are unchanged. Patient has intermittent pain in her L leg while laying down. Patient has been having ringing in her ears.     Patient, Provider, and MA are all wearing a mask in our office today.    History of Present Illness     This patient continues with intermittent pain in her left leg but nothing on a regular basis.    The following portions of the patient's history were reviewed and updated as appropriate: allergies, current medications, past family history, past medical history, past social history, past surgical history and problem list.    Review of Systems   Constitutional: Negative for chills and fever.   HENT: Positive for tinnitus. Negative for congestion.    Musculoskeletal: Positive for back pain. Negative for neck pain and neck stiffness.   Neurological: Negative for weakness and numbness.       I have reviewed the review of systems as documented by my MA.      Objective     Vitals:    02/04/21 1310   BP: 118/78   Cuff Size: Adult   Temp: 97.8 °F (36.6 °C)   Weight: 106 kg (233 lb)   Height: 170.2 cm (67\")     Body mass index is 36.49 kg/m².      Physical Exam  Neurological:      Mental Status: She is alert and oriented to person, place, and time.       Neurologic Exam     Mental Status   Oriented to person, place, and time.           Assessment/Plan   Independent Review of Radiographic Studies:      I personally reviewed the images from the following studies.    I reviewed her MRI which was done on 29 January.  This shows an enhancing mass in the left sided spinal canal behind the L2 vertebral body.  It is about a centimeter and a half long and about 5 or 6 mm in diameter.  It does go into the left L2-3 neural foramen.  There has not been much change in the size of the tumor since October 2011.    Medical " Decision Making:      I told the patient and her  that it was not wise for her to wait 10 years to get a follow-up scan.  I fully expected this tumor to be substantially bigger.  I do think we need to follow her every couple of years.  She is in agreement with that plan.    Diagnoses and all orders for this visit:    1. Schwannoma of spinal cord (CMS/HCC) (Primary)  -     MRI Lumbar Spine With & Without Contrast; Future      Return in about 2 years (around 2/4/2023).

## 2021-02-04 ENCOUNTER — OFFICE VISIT (OUTPATIENT)
Dept: NEUROSURGERY | Facility: CLINIC | Age: 41
End: 2021-02-04

## 2021-02-04 VITALS
TEMPERATURE: 97.8 F | BODY MASS INDEX: 36.57 KG/M2 | DIASTOLIC BLOOD PRESSURE: 78 MMHG | HEIGHT: 67 IN | WEIGHT: 233 LBS | SYSTOLIC BLOOD PRESSURE: 118 MMHG

## 2021-02-04 DIAGNOSIS — D33.4 SCHWANNOMA OF SPINAL CORD (HCC): Primary | ICD-10-CM

## 2021-02-04 PROCEDURE — 99213 OFFICE O/P EST LOW 20 MIN: CPT | Performed by: NEUROLOGICAL SURGERY

## 2023-02-07 DIAGNOSIS — D33.4 SCHWANNOMA OF SPINAL CORD: Primary | ICD-10-CM

## 2023-05-24 ENCOUNTER — TELEPHONE (OUTPATIENT)
Dept: NEUROSURGERY | Facility: CLINIC | Age: 43
End: 2023-05-24
Payer: COMMERCIAL

## 2023-05-24 NOTE — TELEPHONE ENCOUNTER
I called and spoke with patient and she states that she does not have insurance at this time and will be getting at the beginning of next year and that is when she plans on following up. I will cancel current order and put in a new recall so that she gets a reminder closer to the beginning of next year. I did verify with her that she is not having any symptoms.     She was due to FU with a new lumbar MRI for Schwannoma of spinal cord

## 2023-11-17 NOTE — TELEPHONE ENCOUNTER
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    Patients Current SpO2: 99%

## 2024-11-08 ENCOUNTER — TELEPHONE (OUTPATIENT)
Dept: NEUROSURGERY | Facility: CLINIC | Age: 44
End: 2024-11-08
Payer: COMMERCIAL

## (undated) DEVICE — NDL HYPO ECLPS SFTY 22G 1 1/2IN

## (undated) DEVICE — ENDOPOUCH RETRIEVER SPECIMEN RETRIEVAL BAGS: Brand: ENDOPOUCH RETRIEVER

## (undated) DEVICE — ENDOPATH XCEL BLADELESS TROCARS WITH STABILITY SLEEVES: Brand: ENDOPATH XCEL

## (undated) DEVICE — ENDOPATH PNEUMONEEDLE INSUFFLATION NEEDLES WITH LUER LOCK CONNECTORS 120MM: Brand: ENDOPATH

## (undated) DEVICE — GLV SURG PREMIERPRO ORTHO LTX PF SZ7.5 BRN

## (undated) DEVICE — VISUALIZATION SYSTEM: Brand: CLEARIFY

## (undated) DEVICE — LOU LAP CHOLE: Brand: MEDLINE INDUSTRIES, INC.

## (undated) DEVICE — OSC VAGINAL HYSTERECTOMY: Brand: MEDLINE INDUSTRIES, INC.

## (undated) DEVICE — SOL NACL 0.9PCT 1000ML

## (undated) DEVICE — DISPOSABLE GRASPER CARTRIDGE: Brand: DIRECT DRIVE REPOSABLE GRASPERS

## (undated) DEVICE — SUT VIC 0 CT2 CR8 18IN DYED J727D

## (undated) DEVICE — ENDOPATH XCEL UNIVERSAL TROCAR STABLILITY SLEEVES: Brand: ENDOPATH XCEL

## (undated) DEVICE — ADHS LIQ MASTISOL 2/3ML

## (undated) DEVICE — GLV SURG TRIUMPH CLASSIC PF LTX 7.5 STRL

## (undated) DEVICE — SUT GUT CHRM 2/0 SH 27IN G123H

## (undated) DEVICE — SYR LUERLOK 20CC BX/50

## (undated) DEVICE — APPL HEMOS FOR DELIVERY FLOSEAL

## (undated) DEVICE — CONTAINER,SPECIMEN,OR STERILE,4OZ: Brand: MEDLINE

## (undated) DEVICE — SUT VIC 0 CT1 36IN J946H

## (undated) DEVICE — TOWEL,OR,DSP,ST,BLUE,STD,4/PK,20PK/CS: Brand: MEDLINE

## (undated) DEVICE — ENDOPATH ETS-FLEX45 ARTICULATING ENDOSCOPIC LINEAR CUTTER, NO RELOAD: Brand: ENDOPATH

## (undated) DEVICE — APPL CHLORAPREP W/TINT 26ML ORNG

## (undated) DEVICE — VAGINAL PACKING: Brand: DEROYAL

## (undated) DEVICE — IRRIGATOR BULB ASEPTO 60CC STRL

## (undated) DEVICE — ADHS SKIN DERMABOND TOP ADVANCED

## (undated) DEVICE — 2, DISPOSABLE SUCTION/IRRIGATOR WITH DISPOSABLE TIP: Brand: STRYKEFLOW

## (undated) DEVICE — SUT VIC 0 TIES 18IN J912G

## (undated) DEVICE — SUT VIC 0 CT1 CR8 18IN DYED J740D

## (undated) DEVICE — NDL SPINE 22G 31/2IN BLK

## (undated) DEVICE — 3M™ STERI-STRIP™ REINFORCED ADHESIVE SKIN CLOSURES, R1547, 1/2 IN X 4 IN (12 MM X 100 MM), 6 STRIPS/ENVELOPE: Brand: 3M™ STERI-STRIP™

## (undated) DEVICE — SUT VIC 0 TN 27IN DYED JTN0G

## (undated) DEVICE — THE AQUASHIELD SYSTEM IS INTENDED TO BE USED WITH AN AIR SOURCE FROM AN ENDOSCOPE WITH THE PURPOSE OF SUPPLYING STERILE WATER TO THE ENDOSCOPE DURING ENDOSCOPIC PROCEDURES.: Brand: AQUASHIELD

## (undated) DEVICE — GLV SURG BIOGEL LTX PF 8

## (undated) DEVICE — ENSEAL TRIO TEMPERATURE CONTOLLED TISSUE SEALING TECHNOLOGY DISPOSABLE TISSUE SEALING DEVICE TAPTRONIC TRIGGER ACTIVATED POWER 3MM CURVED JAW: Brand: ENSEAL

## (undated) DEVICE — SUT MNCRYL PLS ANTIB UD 4/0 PS2 18IN

## (undated) DEVICE — GLV SURG BIOGEL LTX PF 6 1/2